# Patient Record
Sex: MALE | Race: WHITE | Employment: OTHER | URBAN - METROPOLITAN AREA
[De-identification: names, ages, dates, MRNs, and addresses within clinical notes are randomized per-mention and may not be internally consistent; named-entity substitution may affect disease eponyms.]

---

## 2017-01-31 RX ORDER — METOPROLOL SUCCINATE 50 MG/1
TABLET, EXTENDED RELEASE ORAL
Qty: 90 TABLET | Refills: 3 | Status: SHIPPED | OUTPATIENT
Start: 2017-01-31 | End: 2017-03-07 | Stop reason: DRUGHIGH

## 2017-03-07 ENCOUNTER — OFFICE VISIT (OUTPATIENT)
Dept: CARDIOLOGY CLINIC | Age: 72
End: 2017-03-07

## 2017-03-07 VITALS
HEIGHT: 69 IN | WEIGHT: 189 LBS | HEART RATE: 62 BPM | DIASTOLIC BLOOD PRESSURE: 88 MMHG | OXYGEN SATURATION: 95 % | SYSTOLIC BLOOD PRESSURE: 152 MMHG | BODY MASS INDEX: 27.99 KG/M2

## 2017-03-07 DIAGNOSIS — E78.00 PURE HYPERCHOLESTEROLEMIA: ICD-10-CM

## 2017-03-07 DIAGNOSIS — N18.3 CKD (CHRONIC KIDNEY DISEASE), STAGE 3 (MODERATE): ICD-10-CM

## 2017-03-07 DIAGNOSIS — I25.2 HISTORY OF ST ELEVATION MYOCARDIAL INFARCTION (STEMI): Chronic | ICD-10-CM

## 2017-03-07 DIAGNOSIS — I10 ESSENTIAL HYPERTENSION: Chronic | ICD-10-CM

## 2017-03-07 DIAGNOSIS — I25.10 CORONARY ARTERY DISEASE INVOLVING NATIVE CORONARY ARTERY OF NATIVE HEART WITHOUT ANGINA PECTORIS: Primary | Chronic | ICD-10-CM

## 2017-03-07 PROCEDURE — G8427 DOCREV CUR MEDS BY ELIG CLIN: HCPCS | Performed by: INTERNAL MEDICINE

## 2017-03-07 PROCEDURE — 3017F COLORECTAL CA SCREEN DOC REV: CPT | Performed by: INTERNAL MEDICINE

## 2017-03-07 PROCEDURE — 1036F TOBACCO NON-USER: CPT | Performed by: INTERNAL MEDICINE

## 2017-03-07 PROCEDURE — G8420 CALC BMI NORM PARAMETERS: HCPCS | Performed by: INTERNAL MEDICINE

## 2017-03-07 PROCEDURE — 99214 OFFICE O/P EST MOD 30 MIN: CPT | Performed by: INTERNAL MEDICINE

## 2017-03-07 PROCEDURE — 1123F ACP DISCUSS/DSCN MKR DOCD: CPT | Performed by: INTERNAL MEDICINE

## 2017-03-07 PROCEDURE — G8598 ASA/ANTIPLAT THER USED: HCPCS | Performed by: INTERNAL MEDICINE

## 2017-03-07 PROCEDURE — 4040F PNEUMOC VAC/ADMIN/RCVD: CPT | Performed by: INTERNAL MEDICINE

## 2017-03-07 PROCEDURE — G8484 FLU IMMUNIZE NO ADMIN: HCPCS | Performed by: INTERNAL MEDICINE

## 2017-03-07 RX ORDER — METOPROLOL SUCCINATE 25 MG/1
25 TABLET, EXTENDED RELEASE ORAL DAILY
Status: ON HOLD | COMMUNITY
End: 2019-12-27 | Stop reason: HOSPADM

## 2017-03-07 RX ORDER — PIOGLITAZONEHYDROCHLORIDE 45 MG/1
45 TABLET ORAL DAILY
COMMUNITY
End: 2018-08-17 | Stop reason: ALTCHOICE

## 2017-03-07 RX ORDER — LISINOPRIL AND HYDROCHLOROTHIAZIDE 20; 12.5 MG/1; MG/1
1 TABLET ORAL DAILY
COMMUNITY
End: 2017-06-21 | Stop reason: ALTCHOICE

## 2017-03-10 RX ORDER — PRAVASTATIN SODIUM 10 MG
10 TABLET ORAL DAILY
Qty: 30 TABLET | Refills: 6 | Status: SHIPPED | OUTPATIENT
Start: 2017-03-10 | End: 2017-10-05

## 2017-04-03 RX ORDER — ATORVASTATIN CALCIUM 80 MG/1
TABLET, FILM COATED ORAL
Qty: 30 TABLET | Refills: 5 | OUTPATIENT
Start: 2017-04-03

## 2017-04-04 ENCOUNTER — TELEPHONE (OUTPATIENT)
Dept: CARDIOLOGY CLINIC | Age: 72
End: 2017-04-04

## 2017-05-05 ENCOUNTER — OFFICE VISIT (OUTPATIENT)
Dept: CARDIOLOGY CLINIC | Age: 72
End: 2017-05-05

## 2017-05-05 VITALS
HEIGHT: 69 IN | HEART RATE: 86 BPM | DIASTOLIC BLOOD PRESSURE: 70 MMHG | SYSTOLIC BLOOD PRESSURE: 138 MMHG | OXYGEN SATURATION: 97 % | WEIGHT: 193 LBS | BODY MASS INDEX: 28.58 KG/M2

## 2017-05-05 DIAGNOSIS — E78.00 PURE HYPERCHOLESTEROLEMIA: Chronic | ICD-10-CM

## 2017-05-05 DIAGNOSIS — I10 ESSENTIAL HYPERTENSION: Chronic | ICD-10-CM

## 2017-05-05 DIAGNOSIS — N18.3 CKD (CHRONIC KIDNEY DISEASE), STAGE 3 (MODERATE): ICD-10-CM

## 2017-05-05 DIAGNOSIS — I25.10 CORONARY ARTERY DISEASE INVOLVING NATIVE CORONARY ARTERY OF NATIVE HEART WITHOUT ANGINA PECTORIS: Primary | Chronic | ICD-10-CM

## 2017-05-05 PROCEDURE — 99214 OFFICE O/P EST MOD 30 MIN: CPT | Performed by: INTERNAL MEDICINE

## 2017-05-05 PROCEDURE — G8598 ASA/ANTIPLAT THER USED: HCPCS | Performed by: INTERNAL MEDICINE

## 2017-05-05 PROCEDURE — G8427 DOCREV CUR MEDS BY ELIG CLIN: HCPCS | Performed by: INTERNAL MEDICINE

## 2017-05-05 PROCEDURE — 3017F COLORECTAL CA SCREEN DOC REV: CPT | Performed by: INTERNAL MEDICINE

## 2017-05-05 PROCEDURE — G8420 CALC BMI NORM PARAMETERS: HCPCS | Performed by: INTERNAL MEDICINE

## 2017-05-05 PROCEDURE — 4040F PNEUMOC VAC/ADMIN/RCVD: CPT | Performed by: INTERNAL MEDICINE

## 2017-05-05 PROCEDURE — 1123F ACP DISCUSS/DSCN MKR DOCD: CPT | Performed by: INTERNAL MEDICINE

## 2017-05-05 PROCEDURE — 1036F TOBACCO NON-USER: CPT | Performed by: INTERNAL MEDICINE

## 2017-10-05 RX ORDER — ROSUVASTATIN CALCIUM 10 MG/1
TABLET, COATED ORAL
Qty: 90 TABLET | Refills: 3 | Status: SHIPPED | OUTPATIENT
Start: 2017-10-05 | End: 2018-10-17 | Stop reason: SDUPTHER

## 2017-10-05 NOTE — TELEPHONE ENCOUNTER
Received rx request for Crestor. I called patient as we had Crestor was switched to pravastatin. He said he never switched and is doing ok on the Crestor.   Ok to call in?

## 2018-07-25 ENCOUNTER — HOSPITAL ENCOUNTER (OUTPATIENT)
Dept: CT IMAGING | Age: 73
Discharge: HOME | End: 2018-07-25

## 2018-07-25 DIAGNOSIS — R97.20 ELEVATED PROSTATE SPECIFIC ANTIGEN (PSA): ICD-10-CM

## 2018-07-25 LAB
GFR AFRICAN AMERICAN: 55
GFR NON-AFRICAN AMERICAN: 46
PERFORMED ON: ABNORMAL
POC CREATININE: 1.5 MG/DL (ref 0.8–1.3)
POC SAMPLE TYPE: ABNORMAL

## 2018-07-25 RX ORDER — TC 99M MEDRONATE 20 MG/10ML
25 INJECTION, POWDER, LYOPHILIZED, FOR SOLUTION INTRAVENOUS
Status: COMPLETED | OUTPATIENT
Start: 2018-07-25 | End: 2018-07-25

## 2018-07-25 RX ADMIN — TC 99M MEDRONATE 25 MILLICURIE: 20 INJECTION, POWDER, LYOPHILIZED, FOR SOLUTION INTRAVENOUS at 09:13

## 2018-08-17 ENCOUNTER — HOSPITAL ENCOUNTER (OUTPATIENT)
Dept: PREADMISSION TESTING | Age: 73
Discharge: HOME | End: 2018-08-17
Attending: UROLOGY | Admitting: UROLOGY

## 2018-08-17 ENCOUNTER — PAT TELEPHONE (OUTPATIENT)
Dept: PREADMISSION TESTING | Age: 73
End: 2018-08-17

## 2018-08-17 VITALS — WEIGHT: 182 LBS | BODY MASS INDEX: 26.96 KG/M2 | HEIGHT: 69 IN

## 2018-08-17 DIAGNOSIS — Z01.818 ENCOUNTER FOR PREADMISSION TESTING: ICD-10-CM

## 2018-08-17 LAB
ABO/RH: NORMAL
ANION GAP SERPL CALCULATED.3IONS-SCNC: 12 MMOL/L (ref 3–16)
ANTIBODY SCREEN: NORMAL
APTT: 29.1 SEC (ref 26–36)
BUN BLDV-MCNC: 32 MG/DL (ref 7–20)
CALCIUM SERPL-MCNC: 9.3 MG/DL (ref 8.3–10.6)
CHLORIDE BLD-SCNC: 106 MMOL/L (ref 99–110)
CO2: 25 MMOL/L (ref 21–32)
CREAT SERPL-MCNC: 1.6 MG/DL (ref 0.8–1.3)
EKG ATRIAL RATE: 66 BPM
EKG DIAGNOSIS: NORMAL
EKG P AXIS: 68 DEGREES
EKG P-R INTERVAL: 188 MS
EKG Q-T INTERVAL: 420 MS
EKG QRS DURATION: 80 MS
EKG QTC CALCULATION (BAZETT): 440 MS
EKG R AXIS: 51 DEGREES
EKG T AXIS: 117 DEGREES
EKG VENTRICULAR RATE: 66 BPM
GFR AFRICAN AMERICAN: 51
GFR NON-AFRICAN AMERICAN: 42
GLUCOSE BLD-MCNC: 160 MG/DL (ref 70–99)
HCT VFR BLD CALC: 45.8 % (ref 40.5–52.5)
HEMOGLOBIN: 15.2 G/DL (ref 13.5–17.5)
INR BLD: 1.04 (ref 0.86–1.14)
MCH RBC QN AUTO: 30.8 PG (ref 26–34)
MCHC RBC AUTO-ENTMCNC: 33.1 G/DL (ref 31–36)
MCV RBC AUTO: 93.1 FL (ref 80–100)
PDW BLD-RTO: 14.6 % (ref 12.4–15.4)
PLATELET # BLD: 232 K/UL (ref 135–450)
PMV BLD AUTO: 7.8 FL (ref 5–10.5)
POTASSIUM SERPL-SCNC: 4.7 MMOL/L (ref 3.5–5.1)
PROTHROMBIN TIME: 11.9 SEC (ref 9.8–13)
RBC # BLD: 4.92 M/UL (ref 4.2–5.9)
SODIUM BLD-SCNC: 143 MMOL/L (ref 136–145)
WBC # BLD: 9 K/UL (ref 4–11)

## 2018-08-17 NOTE — PRE-PROCEDURE INSTRUCTIONS
C-Difficile admission screening and protocol:     * Admitted with diarrhea? no     *Prior history of C-Diff. In last 3 months? no     *Antibiotic use in the past 6-8 weeks?yes   *Prior hospitalization or nursing home in the last month?  no

## 2018-08-22 PROBLEM — C61 PROSTATE CANCER (HCC): Status: ACTIVE | Noted: 2018-08-22

## 2018-08-24 PROBLEM — R33.9 RETENTION, URINE: Status: ACTIVE | Noted: 2018-08-24

## 2018-10-17 RX ORDER — ROSUVASTATIN CALCIUM 10 MG/1
TABLET, COATED ORAL
Qty: 90 TABLET | Refills: 2 | Status: SHIPPED | OUTPATIENT
Start: 2018-10-17 | End: 2019-07-01 | Stop reason: SDUPTHER

## 2018-11-30 ENCOUNTER — OFFICE VISIT (OUTPATIENT)
Dept: CARDIOLOGY CLINIC | Age: 73
End: 2018-11-30
Payer: MEDICARE

## 2018-11-30 VITALS
OXYGEN SATURATION: 98 % | WEIGHT: 179.2 LBS | SYSTOLIC BLOOD PRESSURE: 170 MMHG | BODY MASS INDEX: 26.54 KG/M2 | DIASTOLIC BLOOD PRESSURE: 90 MMHG | HEIGHT: 69 IN | HEART RATE: 70 BPM

## 2018-11-30 DIAGNOSIS — I25.10 CORONARY ARTERY DISEASE INVOLVING NATIVE CORONARY ARTERY OF NATIVE HEART WITHOUT ANGINA PECTORIS: Primary | Chronic | ICD-10-CM

## 2018-11-30 DIAGNOSIS — I25.2 HISTORY OF ST ELEVATION MYOCARDIAL INFARCTION (STEMI): ICD-10-CM

## 2018-11-30 DIAGNOSIS — N18.30 STAGE 3 CHRONIC KIDNEY DISEASE (HCC): ICD-10-CM

## 2018-11-30 DIAGNOSIS — I10 ESSENTIAL HYPERTENSION: Chronic | ICD-10-CM

## 2018-11-30 DIAGNOSIS — E78.00 PURE HYPERCHOLESTEROLEMIA: Chronic | ICD-10-CM

## 2018-11-30 PROCEDURE — G8427 DOCREV CUR MEDS BY ELIG CLIN: HCPCS | Performed by: INTERNAL MEDICINE

## 2018-11-30 PROCEDURE — 1036F TOBACCO NON-USER: CPT | Performed by: INTERNAL MEDICINE

## 2018-11-30 PROCEDURE — 99214 OFFICE O/P EST MOD 30 MIN: CPT | Performed by: INTERNAL MEDICINE

## 2018-11-30 PROCEDURE — G8598 ASA/ANTIPLAT THER USED: HCPCS | Performed by: INTERNAL MEDICINE

## 2018-11-30 PROCEDURE — 1101F PT FALLS ASSESS-DOCD LE1/YR: CPT | Performed by: INTERNAL MEDICINE

## 2018-11-30 PROCEDURE — G8419 CALC BMI OUT NRM PARAM NOF/U: HCPCS | Performed by: INTERNAL MEDICINE

## 2018-11-30 PROCEDURE — G8484 FLU IMMUNIZE NO ADMIN: HCPCS | Performed by: INTERNAL MEDICINE

## 2018-11-30 PROCEDURE — 1123F ACP DISCUSS/DSCN MKR DOCD: CPT | Performed by: INTERNAL MEDICINE

## 2018-11-30 PROCEDURE — 3017F COLORECTAL CA SCREEN DOC REV: CPT | Performed by: INTERNAL MEDICINE

## 2018-11-30 PROCEDURE — 4040F PNEUMOC VAC/ADMIN/RCVD: CPT | Performed by: INTERNAL MEDICINE

## 2018-11-30 RX ORDER — LISINOPRIL 10 MG/1
10 TABLET ORAL DAILY
Qty: 30 TABLET | Refills: 6 | Status: ON HOLD | OUTPATIENT
Start: 2018-11-30 | End: 2019-12-27 | Stop reason: HOSPADM

## 2018-11-30 RX ORDER — ASPIRIN 81 MG/1
81 TABLET ORAL DAILY
Qty: 90 TABLET | Refills: 1
Start: 2018-11-30 | End: 2020-03-19 | Stop reason: SINTOL

## 2018-11-30 NOTE — PROGRESS NOTES
[address]      Cardiology Consult    Long Conteh  1945 November 30, 2018    Reason for Visit: CAD    CC: \"I had chest discomfort 6 weeks ago\"    HPI:  The patient is 74 y.o. male with a past medical history significant for CAD s/p PCI, HTN, and DM who presents for follow-up regarding CAD. He was hospitalized 5/1-5/3/2016 after presenting to [address] [place] [address] with left arm pain and chest pressure and was found to have acute inferior wall MI. He was transferred to [facility] for urgent cardiac cath which revealed a 100% mid Lcx stenosis that was successfully intervened with DENA placed. There were no complications and post procedure course was non-eventful. He has residual disease in his LAD, diagnoal & distal RCA. Echo shows normal LV function. He was admitted to [address] [place] [address] 2/2017 with atypical chest pains. He ruled out for an MI and underwent stress testing which did not show any reversible defects. Today, he reports a brief (<2 minutes) nonexertional episode of chest pain which occurred about 6-8 weeks ago without recurrence. He denies exertional chest pain/pressure, dyspnea at rest, worsening MERCEDES, PND, orthopnea, palpitations, lightheadedness, weight changes, LE edema, and syncope. He states that he started radiation treatments for his prostate cancer. He is chronically fatigued. His BP at home has been running 551-843 systolic. Past Medical History:   Diagnosis Date    Asthma     as a child-not current    CAD (coronary artery disease)     Diabetes mellitus ([address]    Michelle CALVILLO (hard of hearing)     wears bilat hearing aids    HTN (hypertension)     Hyperlipidemia     Melanoma ([address]     back    Prostate CA [facility])      Past Surgical History:   Procedure Laterality Date    CORONARY ANGIOPLASTY WITH STENT PLACEMENT  5/1/2016    DENA to mid circ    CYSTOSCOPY  08/22/2018    cysto, [facility] T[facility]    HERNIA REPAIR Bilateral distant past    SHOULDER SURGERY Bilateral 10-12 years ago     History reviewed. Successful angioplasty followed by stent deployment in the mid circumflex artery using a drug-eluting stent, 100% stenosis reduced to 0% using 3.25 by 18 Alpine stent. Final diameter was 3.34 with 0% residual stenosis. SAMEER grade-3 flow was established. There appears to be a 40% to 50% stenosis of the distal circumflex branch beyond the area of the stenosis. There appears to be also a microembolization in a small distal circumflex branch. Echo 5/2/2016:  Left ventricle size is normal.  Mild concentric left ventricular hypertrophy is present. Ejection fraction is visually estimated to be 50-55 %. There is reversal of E/A inflow velocities across the mitral valve suggesting impaired left ventricular relaxation. The left atrium is dilated. Myoview 2/22/17 THE [facility] report)  Fixed defect at the \"superior aspect of the inferolateral wall. \"  EF 52%. Assessment and Plan:    1. CAD with history of STEMI. Status post DENA to LCx 5/1/2016. Asymptomatic. Continue with medical management and risk factor modification including ASA, ACE-I, and beta blocker. (Fatigue not improved off BB). Discontinued plavix since it has been 1 year since PCI. Completed cardiac rehab. 2. Essential Hypertension. Uncontrolled. With diabetes, goal BP <130/80. Continue medical therapy and lifestyle modifications. Today, will start lisinopril 10mg daily and continue to monitor. BMP in 1-2 weeks. He says he will follow-up with his PCP for medication titration. 3. Hyperlipidemia. Reports muscle weakness on high dose Lipitor. Continue Crestor. 4. CKD. Stage III. Creatinine 1.5 9/2018. Previously referred to nephrology. Follow up in 1 year. Thank you very much for allowing me to participate in the care of your patient. Please do not hesitate to contact me if you have any questions. Sincerely,  Mady Jimenez.  Rosa Elena Key, [address] [place] [address], [address] [place], [place] [address]  Ph: [phone]  Fax: [phone]    This note was scribed in the presence of the physician by Cait Sosa RN. Physician Attestation: The scribes documentation has been prepared under my direction and personally reviewed by me in its entirety. I confirm that the note above accurately reflects all work, treatment, procedures, and medical decision making performed by me.

## 2018-12-19 ENCOUNTER — HOSPITAL ENCOUNTER (OUTPATIENT)
Dept: WOMENS IMAGING | Age: 73
Discharge: HOME | End: 2018-12-19
Payer: MEDICARE

## 2018-12-19 DIAGNOSIS — M81.0 OSTEOPOROSIS, UNSPECIFIED OSTEOPOROSIS TYPE, UNSPECIFIED PATHOLOGICAL FRACTURE PRESENCE: ICD-10-CM

## 2018-12-19 PROCEDURE — 77080 DXA BONE DENSITY AXIAL: CPT

## 2019-07-02 RX ORDER — ROSUVASTATIN CALCIUM 10 MG/1
TABLET, COATED ORAL
Qty: 90 TABLET | Refills: 3 | Status: SHIPPED | OUTPATIENT
Start: 2019-07-02 | End: 2020-06-23

## 2019-07-29 ENCOUNTER — HOSPITAL ENCOUNTER (EMERGENCY)
Age: 74
Discharge: HOME | End: 2019-07-29
Attending: EMERGENCY MEDICINE
Payer: MEDICARE

## 2019-07-29 ENCOUNTER — APPOINTMENT (OUTPATIENT)
Dept: CT IMAGING | Age: 74
End: 2019-07-29
Payer: MEDICARE

## 2019-07-29 VITALS
SYSTOLIC BLOOD PRESSURE: 185 MMHG | DIASTOLIC BLOOD PRESSURE: 85 MMHG | OXYGEN SATURATION: 97 % | RESPIRATION RATE: 16 BRPM | TEMPERATURE: 96.9 F | HEART RATE: 68 BPM

## 2019-07-29 DIAGNOSIS — R51.9 ACUTE NONINTRACTABLE HEADACHE, UNSPECIFIED HEADACHE TYPE: ICD-10-CM

## 2019-07-29 DIAGNOSIS — J01.10 ACUTE FRONTAL SINUSITIS, RECURRENCE NOT SPECIFIED: Primary | ICD-10-CM

## 2019-07-29 LAB
A/G RATIO: 1.4 (ref 1.1–2.2)
ALBUMIN SERPL-MCNC: 3.8 G/DL (ref 3.4–5)
ALP BLD-CCNC: 98 U/L (ref 40–129)
ALT SERPL-CCNC: 13 U/L (ref 10–40)
ANION GAP SERPL CALCULATED.3IONS-SCNC: 11 MMOL/L (ref 3–16)
AST SERPL-CCNC: 14 U/L (ref 15–37)
BASOPHILS ABSOLUTE: 0.1 K/UL (ref 0–0.2)
BASOPHILS RELATIVE PERCENT: 0.7 %
BETA-HYDROXYBUTYRATE: 0.11 MMOL/L (ref 0–0.27)
BILIRUB SERPL-MCNC: 0.4 MG/DL (ref 0–1)
BUN BLDV-MCNC: 27 MG/DL (ref 7–20)
CALCIUM SERPL-MCNC: 9.8 MG/DL (ref 8.3–10.6)
CHLORIDE BLD-SCNC: 102 MMOL/L (ref 99–110)
CO2: 24 MMOL/L (ref 21–32)
CREAT SERPL-MCNC: 1.4 MG/DL (ref 0.8–1.3)
EOSINOPHILS ABSOLUTE: 0.4 K/UL (ref 0–0.6)
EOSINOPHILS RELATIVE PERCENT: 3.7 %
GFR AFRICAN AMERICAN: 60
GFR NON-AFRICAN AMERICAN: 49
GLOBULIN: 2.8 G/DL
GLUCOSE BLD-MCNC: 169 MG/DL (ref 70–99)
HCT VFR BLD CALC: 37.4 % (ref 40.5–52.5)
HEMOGLOBIN: 12.9 G/DL (ref 13.5–17.5)
LYMPHOCYTES ABSOLUTE: 1.5 K/UL (ref 1–5.1)
LYMPHOCYTES RELATIVE PERCENT: 14.9 %
MCH RBC QN AUTO: 31.7 PG (ref 26–34)
MCHC RBC AUTO-ENTMCNC: 34.4 G/DL (ref 31–36)
MCV RBC AUTO: 92.1 FL (ref 80–100)
MONOCYTES ABSOLUTE: 1 K/UL (ref 0–1.3)
MONOCYTES RELATIVE PERCENT: 10.3 %
NEUTROPHILS ABSOLUTE: 6.9 K/UL (ref 1.7–7.7)
NEUTROPHILS RELATIVE PERCENT: 70.4 %
PDW BLD-RTO: 13.6 % (ref 12.4–15.4)
PLATELET # BLD: 217 K/UL (ref 135–450)
PMV BLD AUTO: 6.8 FL (ref 5–10.5)
POTASSIUM REFLEX MAGNESIUM: 4.3 MMOL/L (ref 3.5–5.1)
RBC # BLD: 4.06 M/UL (ref 4.2–5.9)
SEDIMENTATION RATE, ERYTHROCYTE: 44 MM/HR (ref 0–20)
SODIUM BLD-SCNC: 137 MMOL/L (ref 136–145)
TOTAL PROTEIN: 6.6 G/DL (ref 6.4–8.2)
WBC # BLD: 9.8 K/UL (ref 4–11)

## 2019-07-29 PROCEDURE — 96365 THER/PROPH/DIAG IV INF INIT: CPT

## 2019-07-29 PROCEDURE — 85025 COMPLETE CBC W/AUTO DIFF WBC: CPT

## 2019-07-29 PROCEDURE — 6360000002 HC RX W HCPCS: Performed by: EMERGENCY MEDICINE

## 2019-07-29 PROCEDURE — 2580000003 HC RX 258: Performed by: EMERGENCY MEDICINE

## 2019-07-29 PROCEDURE — 80053 COMPREHEN METABOLIC PANEL: CPT

## 2019-07-29 PROCEDURE — 70450 CT HEAD/BRAIN W/O DYE: CPT

## 2019-07-29 PROCEDURE — 70486 CT MAXILLOFACIAL W/O DYE: CPT

## 2019-07-29 PROCEDURE — 96375 TX/PRO/DX INJ NEW DRUG ADDON: CPT

## 2019-07-29 PROCEDURE — 85652 RBC SED RATE AUTOMATED: CPT

## 2019-07-29 PROCEDURE — 82010 KETONE BODYS QUAN: CPT

## 2019-07-29 PROCEDURE — 99284 EMERGENCY DEPT VISIT MOD MDM: CPT

## 2019-07-29 RX ORDER — KETOROLAC TROMETHAMINE 10 MG/1
10 TABLET, FILM COATED ORAL 3 TIMES DAILY PRN
Qty: 15 TABLET | Refills: 0 | Status: SHIPPED | OUTPATIENT
Start: 2019-07-29 | End: 2020-01-10 | Stop reason: ALTCHOICE

## 2019-07-29 RX ORDER — KETOROLAC TROMETHAMINE 30 MG/ML
15 INJECTION, SOLUTION INTRAMUSCULAR; INTRAVENOUS ONCE
Status: COMPLETED | OUTPATIENT
Start: 2019-07-29 | End: 2019-07-29

## 2019-07-29 RX ORDER — CEFUROXIME AXETIL 250 MG/1
250 TABLET ORAL 2 TIMES DAILY
Qty: 28 TABLET | Refills: 0 | Status: SHIPPED | OUTPATIENT
Start: 2019-07-29 | End: 2019-08-12

## 2019-07-29 RX ORDER — METOCLOPRAMIDE HYDROCHLORIDE 5 MG/ML
5 INJECTION INTRAMUSCULAR; INTRAVENOUS ONCE
Status: COMPLETED | OUTPATIENT
Start: 2019-07-29 | End: 2019-07-29

## 2019-07-29 RX ORDER — DIPHENHYDRAMINE HYDROCHLORIDE 50 MG/ML
12.5 INJECTION INTRAMUSCULAR; INTRAVENOUS ONCE
Status: COMPLETED | OUTPATIENT
Start: 2019-07-29 | End: 2019-07-29

## 2019-07-29 RX ORDER — FLUTICASONE PROPIONATE 50 MCG
SPRAY, SUSPENSION (ML) NASAL
Qty: 1 BOTTLE | Refills: 0 | Status: SHIPPED | OUTPATIENT
Start: 2019-07-29 | End: 2020-01-10

## 2019-07-29 RX ORDER — 0.9 % SODIUM CHLORIDE 0.9 %
1000 INTRAVENOUS SOLUTION INTRAVENOUS ONCE
Status: COMPLETED | OUTPATIENT
Start: 2019-07-29 | End: 2019-07-29

## 2019-07-29 RX ADMIN — METOCLOPRAMIDE 5 MG: 5 INJECTION, SOLUTION INTRAMUSCULAR; INTRAVENOUS at 14:46

## 2019-07-29 RX ADMIN — CEFTRIAXONE 1 G: 1 INJECTION, POWDER, FOR SOLUTION INTRAMUSCULAR; INTRAVENOUS at 14:46

## 2019-07-29 RX ADMIN — DIPHENHYDRAMINE HYDROCHLORIDE 12.5 MG: 50 INJECTION, SOLUTION INTRAMUSCULAR; INTRAVENOUS at 14:46

## 2019-07-29 RX ADMIN — KETOROLAC TROMETHAMINE 15 MG: 30 INJECTION, SOLUTION INTRAMUSCULAR at 14:46

## 2019-07-29 RX ADMIN — SODIUM CHLORIDE 1000 ML: 9 INJECTION, SOLUTION INTRAVENOUS at 14:46

## 2019-07-29 ASSESSMENT — ENCOUNTER SYMPTOMS
VOMITING: 0
SORE THROAT: 0
SHORTNESS OF BREATH: 0
COUGH: 0
EYE DISCHARGE: 0
DIARRHEA: 0
ABDOMINAL PAIN: 0
RHINORRHEA: 0
NAUSEA: 0
EYE PAIN: 0
WHEEZING: 0
EYE REDNESS: 0
BACK PAIN: 0

## 2019-07-29 ASSESSMENT — PAIN SCALES - GENERAL
PAINLEVEL_OUTOF10: 7
PAINLEVEL_OUTOF10: 8

## 2019-07-29 ASSESSMENT — PAIN DESCRIPTION - PAIN TYPE: TYPE: ACUTE PAIN

## 2019-07-29 ASSESSMENT — PAIN DESCRIPTION - LOCATION: LOCATION: HEAD

## 2019-07-29 NOTE — ED PROVIDER NOTES
frontal sinus. Mucosal thickening also noted in the sphenoid sinus. The mastoid air cells are well aerated. SOFT TISSUES:  Visualized soft tissues demonstrate no acute abnormality. The visualized portion of the intracranial contents demonstrate no gross acute abnormality. 1. Previous sinus surgery demonstrated with resection of the medial walls of the maxillary sinuses 2. Mucosal thickening seen throughout the paranasal sinuses 3. Evidence for fluid in the left side of the frontal sinus and the right maxillary sinus which could represent acute sinusitis           PROCEDURES   Unless otherwise noted below, none     Procedures    CRITICAL CARE TIME   N/A    CONSULTS:  None    EMERGENCY DEPARTMENT COURSE and DIFFERENTIAL DIAGNOSIS/MDM:   Vitals:    Vitals:    07/29/19 1317   BP: (!) 185/85   Pulse: 68   Resp: 16   Temp: 96.9 °F (36.1 °C)   TempSrc: Oral   SpO2: 97%       Patient was given the following medications:  Medications   0.9 % sodium chloride bolus (1,000 mLs Intravenous New Bag 7/29/19 1446)   metoclopramide (REGLAN) injection 5 mg (5 mg Intravenous Given 7/29/19 1446)   diphenhydrAMINE (BENADRYL) injection 12.5 mg (12.5 mg Intravenous Given 7/29/19 1446)   cefTRIAXone (ROCEPHIN) 1 g IVPB in 50 mL D5W minibag (1 g Intravenous New Bag 7/29/19 1446)   ketorolac (TORADOL) injection 15 mg (15 mg Intravenous Given 7/29/19 1446)       Clinically there is no signs of meningitis or acute thromboembolic neurologic event. His neurologic exam is normal.  A good portion of this history sounds very much like sinusitis especially the part where it gets worse when he bends over. He does have an air-fluid level in the frontal sinus which is exactly where he points for his pain. He did not improve with Reglan and Benadryl which makes sense and that this does not seem migrainous. But did respond to a small dose of Toradol. FINAL IMPRESSION      1. Acute frontal sinusitis, recurrence not specified    2.  Acute nonintractable headache, unspecified headache type          DISPOSITION/PLAN    DISPOSITION Discharge - Pending Orders Complete 07/29/2019 03:08:04 PM      PATIENT REFERRED TO:  MD LCIFF Olivas Katya Flores [address] [place] [address]  [phone]            DISCHARGE MEDICATIONS:  New Prescriptions    CEFUROXIME (CEFTIN) 250 MG TABLET    Take 1 tablet by mouth 2 times daily for 14 days    FLUTICASONE (FLONASE) 50 MCG/ACT NASAL SPRAY    1 spray in each nostril twice daily    KETOROLAC (TORADOL) 10 MG TABLET    Take 1 tablet by mouth 3 times daily as needed for Pain Do not take more than 4 pills melquiades 24 hour period       DISCONTINUED MEDICATIONS:  Discontinued Medications    No medications on file              (Please note that portions of this note were completed with a voice recognition program.  Efforts were made to editthe dictations but occasionally words are mis-transcribed.)    Zulema Wallace MD (electronically signed)            Zulema Wallace MD  07/29/19 3818

## 2019-12-26 ENCOUNTER — HOSPITAL ENCOUNTER (INPATIENT)
Dept: CARDIAC CATH/INVASIVE PROCEDURES | Age: 74
LOS: 1 days | Discharge: HOME | DRG: 247 | End: 2019-12-27
Attending: INTERNAL MEDICINE | Admitting: INTERNAL MEDICINE
Payer: MEDICARE

## 2019-12-26 PROBLEM — I21.4 NSTEMI (NON-ST ELEVATED MYOCARDIAL INFARCTION) (HCC): Status: ACTIVE | Noted: 2019-12-26

## 2019-12-26 PROBLEM — Z98.890 S/P LEFT HEART CATHETERIZATION BY PERCUTANEOUS APPROACH: Status: ACTIVE | Noted: 2019-12-26

## 2019-12-26 LAB
EKG ATRIAL RATE: 66 BPM
EKG DIAGNOSIS: NORMAL
EKG P AXIS: 73 DEGREES
EKG P-R INTERVAL: 202 MS
EKG Q-T INTERVAL: 438 MS
EKG QRS DURATION: 86 MS
EKG QTC CALCULATION (BAZETT): 459 MS
EKG R AXIS: 54 DEGREES
EKG T AXIS: 83 DEGREES
EKG VENTRICULAR RATE: 66 BPM
POC ACT LR: 149 SEC
POC ACT LR: 310 SEC

## 2019-12-26 PROCEDURE — 93010 ELECTROCARDIOGRAM REPORT: CPT | Performed by: INTERNAL MEDICINE

## 2019-12-26 PROCEDURE — 2140000000 HC CCU INTERMEDIATE R&B

## 2019-12-26 PROCEDURE — 85347 COAGULATION TIME ACTIVATED: CPT

## 2019-12-26 PROCEDURE — 6360000004 HC RX CONTRAST MEDICATION: Performed by: INTERNAL MEDICINE

## 2019-12-26 PROCEDURE — 99153 MOD SED SAME PHYS/QHP EA: CPT

## 2019-12-26 PROCEDURE — C1894 INTRO/SHEATH, NON-LASER: HCPCS

## 2019-12-26 PROCEDURE — 2580000003 HC RX 258: Performed by: INTERNAL MEDICINE

## 2019-12-26 PROCEDURE — C1887 CATHETER, GUIDING: HCPCS

## 2019-12-26 PROCEDURE — B2111ZZ FLUOROSCOPY OF MULTIPLE CORONARY ARTERIES USING LOW OSMOLAR CONTRAST: ICD-10-PCS | Performed by: INTERNAL MEDICINE

## 2019-12-26 PROCEDURE — C1725 CATH, TRANSLUMIN NON-LASER: HCPCS

## 2019-12-26 PROCEDURE — C1874 STENT, COATED/COV W/DEL SYS: HCPCS

## 2019-12-26 PROCEDURE — 4A033BC MEASUREMENT OF ARTERIAL PRESSURE, CORONARY, PERCUTANEOUS APPROACH: ICD-10-PCS | Performed by: INTERNAL MEDICINE

## 2019-12-26 PROCEDURE — 2580000003 HC RX 258

## 2019-12-26 PROCEDURE — 6370000000 HC RX 637 (ALT 250 FOR IP): Performed by: INTERNAL MEDICINE

## 2019-12-26 PROCEDURE — 93571 IV DOP VEL&/PRESS C FLO 1ST: CPT | Performed by: INTERNAL MEDICINE

## 2019-12-26 PROCEDURE — 93454 CORONARY ARTERY ANGIO S&I: CPT

## 2019-12-26 PROCEDURE — 99152 MOD SED SAME PHYS/QHP 5/>YRS: CPT

## 2019-12-26 PROCEDURE — 6360000002 HC RX W HCPCS: Performed by: NURSE PRACTITIONER

## 2019-12-26 PROCEDURE — 2720000010 HC SURG SUPPLY STERILE

## 2019-12-26 PROCEDURE — 6360000002 HC RX W HCPCS

## 2019-12-26 PROCEDURE — 93005 ELECTROCARDIOGRAM TRACING: CPT | Performed by: INTERNAL MEDICINE

## 2019-12-26 PROCEDURE — 92928 PRQ TCAT PLMT NTRAC ST 1 LES: CPT | Performed by: INTERNAL MEDICINE

## 2019-12-26 PROCEDURE — 93571 IV DOP VEL&/PRESS C FLO 1ST: CPT

## 2019-12-26 PROCEDURE — C1769 GUIDE WIRE: HCPCS

## 2019-12-26 PROCEDURE — C9600 PERC DRUG-EL COR STENT SING: HCPCS

## 2019-12-26 PROCEDURE — C1760 CLOSURE DEV, VASC: HCPCS

## 2019-12-26 PROCEDURE — 2500000003 HC RX 250 WO HCPCS

## 2019-12-26 PROCEDURE — 027135Z DILATION OF CORONARY ARTERY, TWO ARTERIES WITH TWO DRUG-ELUTING INTRALUMINAL DEVICES, PERCUTANEOUS APPROACH: ICD-10-PCS | Performed by: INTERNAL MEDICINE

## 2019-12-26 RX ORDER — FENTANYL CITRATE 50 UG/ML
25 INJECTION, SOLUTION INTRAMUSCULAR; INTRAVENOUS
Status: ACTIVE | OUTPATIENT
Start: 2019-12-26 | End: 2019-12-26

## 2019-12-26 RX ORDER — CLONIDINE HYDROCHLORIDE 0.1 MG/1
0.1 TABLET ORAL ONCE
Status: DISCONTINUED | OUTPATIENT
Start: 2019-12-26 | End: 2019-12-27

## 2019-12-26 RX ORDER — HYDRALAZINE HYDROCHLORIDE 20 MG/ML
10 INJECTION INTRAMUSCULAR; INTRAVENOUS EVERY 6 HOURS PRN
Status: DISCONTINUED | OUTPATIENT
Start: 2019-12-26 | End: 2019-12-27 | Stop reason: HOSPADM

## 2019-12-26 RX ORDER — LISINOPRIL 10 MG/1
10 TABLET ORAL DAILY
Status: DISCONTINUED | OUTPATIENT
Start: 2019-12-26 | End: 2019-12-27

## 2019-12-26 RX ORDER — SODIUM CHLORIDE 9 MG/ML
INJECTION, SOLUTION INTRAVENOUS CONTINUOUS
Status: DISCONTINUED | OUTPATIENT
Start: 2019-12-26 | End: 2019-12-27

## 2019-12-26 RX ORDER — SODIUM CHLORIDE 0.9 % (FLUSH) 0.9 %
10 SYRINGE (ML) INJECTION EVERY 12 HOURS SCHEDULED
Status: DISCONTINUED | OUTPATIENT
Start: 2019-12-26 | End: 2019-12-27 | Stop reason: HOSPADM

## 2019-12-26 RX ORDER — SODIUM CHLORIDE 0.9 % (FLUSH) 0.9 %
10 SYRINGE (ML) INJECTION PRN
Status: DISCONTINUED | OUTPATIENT
Start: 2019-12-26 | End: 2019-12-27 | Stop reason: HOSPADM

## 2019-12-26 RX ORDER — ATORVASTATIN CALCIUM 40 MG/1
40 TABLET, FILM COATED ORAL NIGHTLY
Status: DISCONTINUED | OUTPATIENT
Start: 2019-12-26 | End: 2019-12-27 | Stop reason: HOSPADM

## 2019-12-26 RX ORDER — ASPIRIN 81 MG/1
81 TABLET, CHEWABLE ORAL DAILY
Status: DISCONTINUED | OUTPATIENT
Start: 2019-12-27 | End: 2019-12-27 | Stop reason: HOSPADM

## 2019-12-26 RX ORDER — NIFEDIPINE 60 MG/1
60 TABLET, EXTENDED RELEASE ORAL DAILY
Status: DISCONTINUED | OUTPATIENT
Start: 2019-12-26 | End: 2019-12-27

## 2019-12-26 RX ORDER — MORPHINE SULFATE 2 MG/ML
2 INJECTION, SOLUTION INTRAMUSCULAR; INTRAVENOUS
Status: ACTIVE | OUTPATIENT
Start: 2019-12-26 | End: 2019-12-26

## 2019-12-26 RX ORDER — 0.9 % SODIUM CHLORIDE 0.9 %
500 INTRAVENOUS SOLUTION INTRAVENOUS PRN
Status: DISCONTINUED | OUTPATIENT
Start: 2019-12-26 | End: 2019-12-27 | Stop reason: HOSPADM

## 2019-12-26 RX ORDER — ATROPINE SULFATE 0.4 MG/ML
0.5 AMPUL (ML) INJECTION
Status: ACTIVE | OUTPATIENT
Start: 2019-12-26 | End: 2019-12-26

## 2019-12-26 RX ORDER — ONDANSETRON 2 MG/ML
4 INJECTION INTRAMUSCULAR; INTRAVENOUS EVERY 6 HOURS PRN
Status: DISCONTINUED | OUTPATIENT
Start: 2019-12-26 | End: 2019-12-27 | Stop reason: HOSPADM

## 2019-12-26 RX ORDER — METOPROLOL SUCCINATE 25 MG/1
25 TABLET, EXTENDED RELEASE ORAL DAILY
Status: DISCONTINUED | OUTPATIENT
Start: 2019-12-26 | End: 2019-12-27

## 2019-12-26 RX ORDER — MIDAZOLAM HYDROCHLORIDE 1 MG/ML
2 INJECTION INTRAMUSCULAR; INTRAVENOUS
Status: ACTIVE | OUTPATIENT
Start: 2019-12-26 | End: 2019-12-26

## 2019-12-26 RX ORDER — ACETAMINOPHEN 325 MG/1
650 TABLET ORAL EVERY 4 HOURS PRN
Status: DISCONTINUED | OUTPATIENT
Start: 2019-12-26 | End: 2019-12-27 | Stop reason: HOSPADM

## 2019-12-26 RX ADMIN — TICAGRELOR 90 MG: 90 TABLET ORAL at 21:05

## 2019-12-26 RX ADMIN — IOPAMIDOL 150 ML: 755 INJECTION, SOLUTION INTRAVENOUS at 15:34

## 2019-12-26 RX ADMIN — HYDRALAZINE HYDROCHLORIDE 10 MG: 20 INJECTION INTRAMUSCULAR; INTRAVENOUS at 20:10

## 2019-12-26 RX ADMIN — LISINOPRIL 10 MG: 10 TABLET ORAL at 17:42

## 2019-12-26 RX ADMIN — SODIUM CHLORIDE, PRESERVATIVE FREE 10 ML: 5 INJECTION INTRAVENOUS at 20:10

## 2019-12-26 RX ADMIN — NIFEDIPINE 60 MG: 60 TABLET, FILM COATED, EXTENDED RELEASE ORAL at 18:16

## 2019-12-26 RX ADMIN — ATORVASTATIN CALCIUM 40 MG: 40 TABLET, FILM COATED ORAL at 21:05

## 2019-12-26 RX ADMIN — METOPROLOL SUCCINATE 25 MG: 25 TABLET, EXTENDED RELEASE ORAL at 17:42

## 2019-12-26 ASSESSMENT — PAIN SCALES - GENERAL
PAINLEVEL_OUTOF10: 0

## 2019-12-27 VITALS
HEART RATE: 77 BPM | WEIGHT: 180.34 LBS | HEIGHT: 69 IN | DIASTOLIC BLOOD PRESSURE: 74 MMHG | RESPIRATION RATE: 13 BRPM | TEMPERATURE: 97.9 F | OXYGEN SATURATION: 97 % | SYSTOLIC BLOOD PRESSURE: 157 MMHG | BODY MASS INDEX: 26.71 KG/M2

## 2019-12-27 LAB
EKG ATRIAL RATE: 73 BPM
EKG DIAGNOSIS: NORMAL
EKG P AXIS: 62 DEGREES
EKG P-R INTERVAL: 194 MS
EKG Q-T INTERVAL: 448 MS
EKG QRS DURATION: 86 MS
EKG QTC CALCULATION (BAZETT): 493 MS
EKG R AXIS: 27 DEGREES
EKG T AXIS: 100 DEGREES
EKG VENTRICULAR RATE: 73 BPM
GLUCOSE BLD-MCNC: 249 MG/DL (ref 70–99)
HCT VFR BLD CALC: 37.7 % (ref 40.5–52.5)
HEMOGLOBIN: 12.7 G/DL (ref 13.5–17.5)
MCH RBC QN AUTO: 30.7 PG (ref 26–34)
MCHC RBC AUTO-ENTMCNC: 33.7 G/DL (ref 31–36)
MCV RBC AUTO: 91 FL (ref 80–100)
PDW BLD-RTO: 14.3 % (ref 12.4–15.4)
PERFORMED ON: ABNORMAL
PLATELET # BLD: 218 K/UL (ref 135–450)
PMV BLD AUTO: 7.1 FL (ref 5–10.5)
RBC # BLD: 4.14 M/UL (ref 4.2–5.9)
WBC # BLD: 10.9 K/UL (ref 4–11)

## 2019-12-27 PROCEDURE — 85027 COMPLETE CBC AUTOMATED: CPT

## 2019-12-27 PROCEDURE — 99238 HOSP IP/OBS DSCHRG MGMT 30/<: CPT | Performed by: INTERNAL MEDICINE

## 2019-12-27 PROCEDURE — 6370000000 HC RX 637 (ALT 250 FOR IP): Performed by: INTERNAL MEDICINE

## 2019-12-27 PROCEDURE — 93010 ELECTROCARDIOGRAM REPORT: CPT | Performed by: INTERNAL MEDICINE

## 2019-12-27 PROCEDURE — 36415 COLL VENOUS BLD VENIPUNCTURE: CPT

## 2019-12-27 PROCEDURE — 93005 ELECTROCARDIOGRAM TRACING: CPT | Performed by: INTERNAL MEDICINE

## 2019-12-27 PROCEDURE — 2580000003 HC RX 258: Performed by: INTERNAL MEDICINE

## 2019-12-27 RX ORDER — LISINOPRIL 10 MG/1
20 TABLET ORAL DAILY
Qty: 30 TABLET | Refills: 6 | Status: CANCELLED | OUTPATIENT
Start: 2019-12-27

## 2019-12-27 RX ORDER — METOPROLOL SUCCINATE 50 MG/1
50 TABLET, EXTENDED RELEASE ORAL DAILY
Qty: 30 TABLET | Refills: 3 | Status: SHIPPED | OUTPATIENT
Start: 2019-12-28 | End: 2020-01-13

## 2019-12-27 RX ORDER — DOXAZOSIN 2 MG/1
4 TABLET ORAL DAILY
Status: DISCONTINUED | OUTPATIENT
Start: 2019-12-27 | End: 2019-12-27 | Stop reason: HOSPADM

## 2019-12-27 RX ORDER — INSULIN GLARGINE 100 [IU]/ML
32 INJECTION, SOLUTION SUBCUTANEOUS 2 TIMES DAILY
Status: DISCONTINUED | OUTPATIENT
Start: 2019-12-27 | End: 2019-12-27 | Stop reason: HOSPADM

## 2019-12-27 RX ORDER — DOXAZOSIN MESYLATE 4 MG/1
4 TABLET ORAL DAILY
Qty: 30 TABLET | Refills: 3 | Status: SHIPPED | OUTPATIENT
Start: 2019-12-27 | End: 2020-04-22

## 2019-12-27 RX ORDER — LISINOPRIL 20 MG/1
20 TABLET ORAL DAILY
Status: DISCONTINUED | OUTPATIENT
Start: 2019-12-27 | End: 2019-12-27 | Stop reason: HOSPADM

## 2019-12-27 RX ORDER — METOPROLOL SUCCINATE 50 MG/1
50 TABLET, EXTENDED RELEASE ORAL DAILY
Status: DISCONTINUED | OUTPATIENT
Start: 2019-12-27 | End: 2019-12-27 | Stop reason: HOSPADM

## 2019-12-27 RX ORDER — METOPROLOL SUCCINATE 25 MG/1
50 TABLET, EXTENDED RELEASE ORAL DAILY
Qty: 30 TABLET | Refills: 3 | Status: CANCELLED | OUTPATIENT
Start: 2019-12-27

## 2019-12-27 RX ORDER — LISINOPRIL 20 MG/1
20 TABLET ORAL DAILY
Qty: 30 TABLET | Refills: 3 | Status: SHIPPED | OUTPATIENT
Start: 2019-12-28 | End: 2020-07-29 | Stop reason: DRUGHIGH

## 2019-12-27 RX ORDER — NIFEDIPINE 60 MG/1
60 TABLET, FILM COATED, EXTENDED RELEASE ORAL DAILY
Qty: 30 TABLET | Refills: 3 | Status: CANCELLED | OUTPATIENT
Start: 2019-12-27

## 2019-12-27 RX ADMIN — LISINOPRIL 20 MG: 20 TABLET ORAL at 10:12

## 2019-12-27 RX ADMIN — INSULIN LISPRO 2 UNITS: 100 INJECTION, SOLUTION INTRAVENOUS; SUBCUTANEOUS at 12:49

## 2019-12-27 RX ADMIN — METOPROLOL SUCCINATE 50 MG: 50 TABLET, EXTENDED RELEASE ORAL at 10:12

## 2019-12-27 RX ADMIN — DOXAZOSIN 4 MG: 2 TABLET ORAL at 12:51

## 2019-12-27 RX ADMIN — NIFEDIPINE 60 MG: 60 TABLET, FILM COATED, EXTENDED RELEASE ORAL at 10:12

## 2019-12-27 RX ADMIN — TICAGRELOR 90 MG: 90 TABLET ORAL at 10:12

## 2019-12-27 RX ADMIN — INSULIN GLARGINE 32 UNITS: 100 INJECTION, SOLUTION SUBCUTANEOUS at 12:49

## 2019-12-27 RX ADMIN — ASPIRIN 81 MG 81 MG: 81 TABLET ORAL at 10:12

## 2019-12-27 RX ADMIN — SODIUM CHLORIDE, PRESERVATIVE FREE 10 ML: 5 INJECTION INTRAVENOUS at 10:13

## 2019-12-27 ASSESSMENT — PAIN SCALES - GENERAL
PAINLEVEL_OUTOF10: 0

## 2020-01-10 ENCOUNTER — OFFICE VISIT (OUTPATIENT)
Dept: CARDIOLOGY CLINIC | Age: 75
End: 2020-01-10
Payer: MEDICARE

## 2020-01-10 VITALS
WEIGHT: 183.4 LBS | OXYGEN SATURATION: 97 % | HEIGHT: 69 IN | HEART RATE: 60 BPM | SYSTOLIC BLOOD PRESSURE: 140 MMHG | BODY MASS INDEX: 27.16 KG/M2 | DIASTOLIC BLOOD PRESSURE: 90 MMHG

## 2020-01-10 PROCEDURE — 1111F DSCHRG MED/CURRENT MED MERGE: CPT | Performed by: INTERNAL MEDICINE

## 2020-01-10 PROCEDURE — 4040F PNEUMOC VAC/ADMIN/RCVD: CPT | Performed by: INTERNAL MEDICINE

## 2020-01-10 PROCEDURE — G8427 DOCREV CUR MEDS BY ELIG CLIN: HCPCS | Performed by: INTERNAL MEDICINE

## 2020-01-10 PROCEDURE — 1123F ACP DISCUSS/DSCN MKR DOCD: CPT | Performed by: INTERNAL MEDICINE

## 2020-01-10 PROCEDURE — 99215 OFFICE O/P EST HI 40 MIN: CPT | Performed by: INTERNAL MEDICINE

## 2020-01-10 PROCEDURE — G8484 FLU IMMUNIZE NO ADMIN: HCPCS | Performed by: INTERNAL MEDICINE

## 2020-01-10 PROCEDURE — G8417 CALC BMI ABV UP PARAM F/U: HCPCS | Performed by: INTERNAL MEDICINE

## 2020-01-10 PROCEDURE — 1036F TOBACCO NON-USER: CPT | Performed by: INTERNAL MEDICINE

## 2020-01-10 ASSESSMENT — ENCOUNTER SYMPTOMS
SHORTNESS OF BREATH: 0
EYE REDNESS: 0
COUGH: 0
NAUSEA: 0
WHEEZING: 0
BLOOD IN STOOL: 0

## 2020-01-10 NOTE — PATIENT INSTRUCTIONS
Patient Education        Learning About Low-Sodium Foods  What foods are low in sodium? The foods you eat contain nutrients, such as vitamins and minerals. Sodium is a nutrient. Your body needs the right amount to stay healthy and work as it should. You can use the list below to help you make choices about which foods to eat. Fruits  · Fresh, frozen, canned, or dried fruit  Vegetables  · Fresh or frozen vegetables, with no added salt  · Canned vegetables, low-sodium or with no added salt  Grains  · Bagels without salted tops  · Cereal with no added salt  · Corn tortillas  · Crackers with no added salt  · Oatmeal, cooked without salt  · Popcorn with no salt  · Pasta and noodles, cooked without salt  · Rice, cooked without salt  · Unsalted pretzels  Dairy and dairy alternatives  · Butter, unsalted  · Cream cheese  · Ice cream  · Milk  · Soy milk  Meats and other protein foods  · Beans and peas, canned with no salt  · Eggs  · Fresh fish (not smoked)  · Fresh meats (not smoked or cured)  · Nuts and nut butter, prepared without salt  · Poultry, not packaged with sodium solution  · Tofu, unseasoned  · Tuna, canned without salt  Seasonings  · Garlic  · Herbs and spices  · Lemon juice  · Mustard  · Olive oil  · Salt-free seasoning mixes  · Vinegar  Work with your doctor to find out how much of this nutrient you need. Depending on your health, you may need more or less of it in your diet. Where can you learn more? Go to [email or web address] and sign in to your [employer] account. Enter W148 in the [facility] box to learn more about \"Learning About Low-Sodium Foods. \"     If you do not have an account, please click on the \"Sign Up Now\" link. Current as of: August 21, 2019  Content Version: 12.3  © 7717-4607 Healthwise, Incorporated. Care instructions adapted under license by [facility] ([facility]).  If you have questions about a medical condition or this instruction, always ask your healthcare professional. [address] any warranty or liability for your use of this information.

## 2020-01-10 NOTE — PROGRESS NOTES
Chris Lewis is a 66 y.o. male    Reason for Visit: hospital f/u for NSTEMI and PCI    HPI Chris Lewis was previously followed by Dr. Corwin Joiner. Today he is here for f/u of his recent hospitalization for NSTEMI and PCI. He denies exertional chest pain, palpitations, dizziness, syncope, leg swelling and PND. He is mildly SOB at times. Sharmin Bookman He is still fatigued. He did not take any of his medications today because he did not want to \"fool with his med box. \"    Review of Systems   Constitutional: Positive for fatigue. Negative for chills, diaphoresis and fever. HENT: Negative for congestion, nosebleeds and tinnitus. Eyes: Negative for redness. Respiratory: Negative for cough, shortness of breath and wheezing. Cardiovascular: Negative for chest pain, palpitations and leg swelling. Gastrointestinal: Negative for blood in stool and nausea. Genitourinary: Negative for dysuria and hematuria. Musculoskeletal: Negative for myalgias and neck pain. Neurological: Negative for dizziness. Hematological: Does not bruise/bleed easily. Physical Exam  Vitals signs and nursing note reviewed. Constitutional:       Appearance: He is well-developed. HENT:      Head: Normocephalic and atraumatic. Eyes:      Conjunctiva/sclera: Conjunctivae normal.   Neck:      Musculoskeletal: Normal range of motion and neck supple. Cardiovascular:      Rate and Rhythm: Normal rate and regular rhythm. Heart sounds: S1 normal and S2 normal. No murmur. No gallop. Comments: Cath site stable  Pulmonary:      Effort: Pulmonary effort is normal.      Breath sounds: Normal breath sounds. Abdominal:      General: Bowel sounds are normal.      Palpations: Abdomen is soft. Musculoskeletal: Normal range of motion. Skin:     General: Skin is warm and dry. Neurological:      Mental Status: He is alert and oriented to person, place, and time.    Psychiatric:         Behavior: Behavior normal.         Wt Readings tongue every 5 minutes as needed for Chest pain, Disp: 25 tablet, Rfl: 3    Past Medical History:   Diagnosis Date    Asthma     as a child-not current    CAD (coronary artery disease)     Diabetes mellitus ([facility]     [place] (hard of hearing)     wears bilat hearing aids    HTN (hypertension)     Hyperlipidemia     Melanoma ([facility]     back    Prostate CA ([facility]        Social History     Socioeconomic History    Marital status:      Spouse name: None    Number of children: None    Years of education: None    Highest education level: None   Occupational History    None   Social Needs    Financial resource strain: None    Food insecurity:     Worry: None     Inability: None    Transportation needs:     Medical: None     Non-medical: None   Tobacco Use    Smoking status: Former Smoker     Types: Cigarettes     Last attempt to quit: 1986     Years since quittin.7    Smokeless tobacco: Never Used   Substance and Sexual Activity    Alcohol use: No     Alcohol/week: 0.0 standard drinks    Drug use: No    Sexual activity: Yes     Partners: Female   Lifestyle    Physical activity:     Days per week: None     Minutes per session: None    Stress: None   Relationships    Social connections:     Talks on phone: None     Gets together: None     Attends Tenriism service: None     Active member of club or organization: None     Attends meetings of clubs or organizations: None     Relationship status: None    Intimate partner violence:     Fear of current or ex partner: None     Emotionally abused: None     Physically abused: None     Forced sexual activity: None   Other Topics Concern    None   Social History Narrative    None       Past Surgical History:   Procedure Laterality Date    CORONARY ANGIOPLASTY WITH STENT PLACEMENT  2016    DENA to mid circ    CYSTOSCOPY  2018    cysto, [facility] TUR[facility]    HERNIA REPAIR Bilateral distant past    SHOULDER SURGERY Bilateral 10-12 years ago History reviewed. No pertinent family history. Allergies   Allergen Reactions    Doxycycline Monohydrate      Unsure of reaction    Tetracyclines & Related Other (See Comments)     welts    Bactrim [Sulfamethoxazole-Trimethoprim] Rash       Recent Labs and Imaging reviewed    Assessment    Echo 5/2/2016:  Left ventricle size is normal.  Mild concentric left ventricular hypertrophy is present. Ejection fraction is visually estimated to be 50-55 %. There is reversal of E/A inflow velocities across the mitral valve suggesting impaired left ventricular relaxation. The left atrium is dilated.     Myoview 2/22/17 THE [facility] report)  Fixed defect at the \"superior aspect of the inferolateral wall. \"  EF 52%. [facility] 5/1/2016:  1.  Left main trunk:  It arises from the left sinus of Valsalva.  It divides into left anterior descending artery and left circumflex artery.  The left main trunk is free of atherosclerosis. 2.  Left anterior descending artery:  It is a moderate-sized artery and it descends into the intraventricular sulcus and wraps around the apex of the heart.  The left main trunk is free of atherosclerosis.      3.  Left anterior descending artery:  It is a moderate-sized artery.  It descends into the intraventricular sulcus and wraps around the apex of the heart.  The left anterior descending artery has about 50% stenosis in the mid to distal portion.  There is also a 50% stenosis of the diagonal branch in the proximal segment.  The lumen of the left anterior descending artery is free of any significant atherosclerosis. 4.  Left circumflex artery:  It arises from the left main trunk.  It is 100% occluded after a 1st obtuse marginal branch.  The right coronary artery arises from right sinus on Valsalva.  It is a dominant artery.  It gives rise to a posterior descending and posterolateral branches.  Right coronary artery has evidence of about 90% to 95% stenosis in the mid to distal portion of the PDA.  There

## 2020-01-13 RX ORDER — METOPROLOL SUCCINATE 50 MG/1
TABLET, EXTENDED RELEASE ORAL
Qty: 90 TABLET | Refills: 3 | Status: SHIPPED | OUTPATIENT
Start: 2020-01-13 | End: 2021-02-11

## 2020-03-17 ASSESSMENT — ENCOUNTER SYMPTOMS
SHORTNESS OF BREATH: 0
EYE REDNESS: 0
NAUSEA: 0
WHEEZING: 0
BLOOD IN STOOL: 0
COUGH: 0

## 2020-03-17 NOTE — PROGRESS NOTES
Becka Betancourt is a 66 y.o. male    Reason for Visit: preop risk assessment for colonoscopy    HPI Becka Betancourt is here today for preop risk assessment prior to having colonoscopy which has not yet been scheduled. He reports rectal bleeding in Feb and stopped taking his ASA and Omega 3 with improvement in the bleeding. He continued to take Brilinta. This month, he noticed as small amount of rectal bleeding but nothing significant the past 2 weeks. Today he denies exertional chest pain, palpitations, dizziness, syncope, leg swelling, worsening dyspnea and PND. Review of Systems   Constitutional: Negative for chills, diaphoresis, fatigue and fever. HENT: Negative for congestion, nosebleeds and tinnitus. Eyes: Negative for redness. Respiratory: Negative for cough, shortness of breath and wheezing. Cardiovascular: Negative for chest pain, palpitations and leg swelling. Gastrointestinal: Negative for blood in stool and nausea. Genitourinary: Negative for dysuria and hematuria. Musculoskeletal: Negative for myalgias and neck pain. Neurological: Negative for dizziness. Hematological: Does not bruise/bleed easily. Physical Exam  Vitals signs and nursing note reviewed. Constitutional:       Appearance: He is well-developed. HENT:      Head: Normocephalic and atraumatic. Eyes:      Conjunctiva/sclera: Conjunctivae normal.   Neck:      Musculoskeletal: Normal range of motion and neck supple. Cardiovascular:      Rate and Rhythm: Normal rate and regular rhythm. Heart sounds: S1 normal and S2 normal. No murmur. No gallop. Pulmonary:      Effort: Pulmonary effort is normal.      Breath sounds: Normal breath sounds. Abdominal:      General: Bowel sounds are normal.      Palpations: Abdomen is soft. Musculoskeletal: Normal range of motion. Skin:     General: Skin is warm and dry. Neurological:      Mental Status: He is alert and oriented to person, place, and time. Diabetes mellitus ([facility]     [place] (hard of hearing)     wears bilat hearing aids    HTN (hypertension)     Hyperlipidemia     [facility]     back    Prostate CA ([facility]        Social History     Socioeconomic History    Marital status:      Spouse name: None    Number of children: None    Years of education: None    Highest education level: None   Occupational History    None   Social Needs    Financial resource strain: None    Food insecurity     Worry: None     Inability: None    Transportation needs     Medical: None     Non-medical: None   Tobacco Use    Smoking status: Former Smoker     Types: Cigarettes     Last attempt to quit: 1986     Years since quittin.9    Smokeless tobacco: Never Used   Substance and Sexual Activity    Alcohol use: No     Alcohol/week: 0.0 standard drinks    Drug use: No    Sexual activity: Yes     Partners: Female   Lifestyle    Physical activity     Days per week: None     Minutes per session: None    Stress: None   Relationships    Social connections     Talks on phone: None     Gets together: None     Attends Yazdanism service: None     Active member of club or organization: None     Attends meetings of clubs or organizations: None     Relationship status: None    Intimate partner violence     Fear of current or ex partner: None     Emotionally abused: None     Physically abused: None     Forced sexual activity: None   Other Topics Concern    None   Social History Narrative    None       Past Surgical History:   Procedure Laterality Date    CORONARY ANGIOPLASTY WITH STENT PLACEMENT  2016    DENA to mid circ    CYSTOSCOPY  2018    cysto, [facility] TURP    HERNIA REPAIR Bilateral distant past    SHOULDER SURGERY Bilateral 10-12 years ago       History reviewed. No pertinent family history.     Allergies   Allergen Reactions    Doxycycline Monohydrate      Unsure of reaction    Tetracyclines & Related Other (See Comments)     shaye function.        5/1/16 Successful angioplasty followed by stent deployment in the mid circumflex artery using a drug-eluting stent, 100% stenosis reduced to 0% using 3.25 by 18 Alpine stent.  Final diameter was 3.34 with 0% residual stenosis.  SAMEER grade-3 flow was established.  There appears to be a 40% to 50% stenosis of the distal circumflex branch beyond the area of the stenosis.  There appears to be also a microembolization in a small distal circumflex branch.     Cath 12/26/19 Coronary angio showed multiple lesions ; Mid PDA 90%; Mid D1 small and 90%; Mid LAD : eccentric and 70%-->    Coronary intervention: 12/26/2019   Two-vessel stenting;  the mid RCA & the mid LAD   FFR of 0.75  LAD: 2.25 x 18 mm long jair DENA stent  Mid PDA: 2.25 x 12 mm long jair DENA stent  Lower diagonal branch was too small for stenting; will be treated   Medically    Echo 12/2019 LVH, LVEF 45-50%, grade I DD, mild AI, MR. Plan     1. CAD with history of STEMI. Status post DENA to LCx 5/1/2016. S/p DENA LAD and mid PDA 12/26/19. Continue with medical management and risk factor modification including statin, Brilinta, and beta blocker. (Fatigue not improved off BB in past). Importance of not stopping Brilinta  discussed. Pt stopped ASA 2/2020 due to rectal bleeding. Colonoscopy to be scheduled. Recommend re-trial of ASA 81mg QOD since pt denies any recent rectal bleeding. If no further bleeding after 2 weeks increase ASA to 81mg daily. Advised to call if he has any further bleeding issues.      2. Essential Hypertension. Controlled.     3. Hyperlipidemia. Reports muscle weakness on high dose Lipitor. Tolerating Crestor. Fasting lipid profile, CMP, proBNP prior to next visit as previously ordered.      4. CKD. Stage III. Creatinine 1.4-1.6. Previously referred to nephrology. Continue ACE. 5. Ischemic CM. ProBNP 1960 12/2019. Low salt diet recommended. 6. Dyspnea. Possibly secondary to Brilinta.      7. Preop risk assessment for colonoscopy. EKG 3/2020 NSR, T wave inversion lateral leads consider ischemia. Advised to post pone colonoscopy for at least 6 months post stent (6/26/20) since rectal bleeding has resolved. Return in about 3 months (around 6/19/2020). This note was scribed in the presence of the physician by Sylvie Martinez RN. The scribes documentation has been prepared under my direction and personally reviewed by me in its entirety. I confirm that the note above accurately reflects all work, treatment, procedures, and medical decision making performed by me. The scribes documentation has been prepared under my direction and personally reviewed by me in its entirety. I confirm that the note above accurately reflects all work, treatment, procedures, and medical decision making performed by me.

## 2020-03-19 ENCOUNTER — OFFICE VISIT (OUTPATIENT)
Dept: CARDIOLOGY CLINIC | Age: 75
End: 2020-03-19
Payer: MEDICARE

## 2020-03-19 VITALS
BODY MASS INDEX: 27.76 KG/M2 | HEART RATE: 74 BPM | SYSTOLIC BLOOD PRESSURE: 128 MMHG | DIASTOLIC BLOOD PRESSURE: 80 MMHG | HEIGHT: 69 IN | WEIGHT: 187.4 LBS | OXYGEN SATURATION: 98 % | TEMPERATURE: 97.3 F

## 2020-03-19 PROCEDURE — 93000 ELECTROCARDIOGRAM COMPLETE: CPT | Performed by: INTERNAL MEDICINE

## 2020-03-19 PROCEDURE — 99215 OFFICE O/P EST HI 40 MIN: CPT | Performed by: INTERNAL MEDICINE

## 2020-03-19 PROCEDURE — 4040F PNEUMOC VAC/ADMIN/RCVD: CPT | Performed by: INTERNAL MEDICINE

## 2020-03-19 PROCEDURE — 1036F TOBACCO NON-USER: CPT | Performed by: INTERNAL MEDICINE

## 2020-03-19 PROCEDURE — G8417 CALC BMI ABV UP PARAM F/U: HCPCS | Performed by: INTERNAL MEDICINE

## 2020-03-19 PROCEDURE — 1123F ACP DISCUSS/DSCN MKR DOCD: CPT | Performed by: INTERNAL MEDICINE

## 2020-03-19 PROCEDURE — G8427 DOCREV CUR MEDS BY ELIG CLIN: HCPCS | Performed by: INTERNAL MEDICINE

## 2020-03-19 PROCEDURE — G8484 FLU IMMUNIZE NO ADMIN: HCPCS | Performed by: INTERNAL MEDICINE

## 2020-03-19 RX ORDER — ASPIRIN 81 MG/1
81 TABLET ORAL DAILY
Qty: 90 TABLET | Refills: 1
Start: 2020-03-19 | End: 2020-07-29

## 2020-03-19 NOTE — PATIENT INSTRUCTIONS
you to eat healthy foods, quit smoking, lose extra weight, and be more active. · You will have to take medicines. · Your doctor may suggest a procedure to open narrowed or blocked arteries. This is called angioplasty. Or your doctor may suggest using healthy blood vessels to create detours around narrowed or blocked arteries. This is called bypass surgery. Follow-up care is a key part of your treatment and safety. Be sure to make and go to all appointments, and call your doctor if you are having problems. It's also a good idea to know your test results and keep a list of the medicines you take. Where can you learn more? Go to https://AudiosocketpeThatgamecompany.BioAssets Development. org and sign in to your URBANARA account. Enter [phone] in the Funding Profiles box to learn more about \"Learning About Coronary Artery Disease (CAD). \"     If you do not have an account, please click on the \"Sign Up Now\" link. Current as of: December 15, 2019Content Version: 12.4  © 7073-7776 Healthwise, Incorporated. Care instructions adapted under license by [facility] ([facility]). If you have questions about a medical condition or this instruction, always ask your healthcare professional. [place] [address] any warranty or liability for your use of this information.

## 2020-04-02 RX ORDER — METOPROLOL SUCCINATE 50 MG/1
TABLET, EXTENDED RELEASE ORAL
Qty: 90 TABLET | Refills: 2 | OUTPATIENT
Start: 2020-04-02

## 2020-04-06 NOTE — TELEPHONE ENCOUNTER
Patient states he seen Dr. Bouchra Calderon and mentioned to him he has rectal bleeding and /Adriana told him to take his ASA every other day. He states he still had some bleeding so he stopped the ASA and started taking his Brilinta 1/2 a table in the morning and 1/2 a tablet at night. He says his bleeding has stopped some what but not all the way so he wants to be switched to Plavix because he says he is not going to take the full dose of the Brilinta anymore. Please advise. If he can be on Plavix he needs a refill sent to [address] [place] [address].

## 2020-04-07 RX ORDER — CLOPIDOGREL BISULFATE 75 MG/1
75 TABLET ORAL DAILY
Qty: 30 TABLET | Refills: 11 | Status: SHIPPED | OUTPATIENT
Start: 2020-04-07 | End: 2021-05-10 | Stop reason: SDUPTHER

## 2020-04-22 RX ORDER — DOXAZOSIN MESYLATE 4 MG/1
TABLET ORAL
Qty: 30 TABLET | Refills: 2 | Status: SHIPPED | OUTPATIENT
Start: 2020-04-22 | End: 2020-11-18

## 2020-06-08 ENCOUNTER — OFFICE VISIT (OUTPATIENT)
Dept: PRIMARY CARE CLINIC | Age: 75
End: 2020-06-08

## 2020-06-08 RX ORDER — LISINOPRIL 20 MG/1
TABLET ORAL
Qty: 30 TABLET | Refills: 2 | OUTPATIENT
Start: 2020-06-08

## 2020-06-08 NOTE — PROGRESS NOTES
Patient presented to [facility] drive up clinic for preop testing. Patient was swabbed and given information advising them to remain isolated until procedure date.

## 2020-06-09 LAB
SARS-COV-2: NOT DETECTED
SOURCE: NORMAL

## 2020-06-23 RX ORDER — ROSUVASTATIN CALCIUM 10 MG/1
TABLET, COATED ORAL
Qty: 90 TABLET | Refills: 2 | Status: ON HOLD
Start: 2020-06-23 | End: 2021-01-16 | Stop reason: HOSPADM

## 2020-07-21 ASSESSMENT — ENCOUNTER SYMPTOMS
BLOOD IN STOOL: 0
EYE REDNESS: 0
COUGH: 0
WHEEZING: 0
SHORTNESS OF BREATH: 0
NAUSEA: 0

## 2020-07-21 NOTE — PROGRESS NOTES
Tamika Harrington is a 66 y.o. male    Reason for Visit:  Hospital f/u chest pain, uncontrolled HTN    HPI Tamika Harrington was hospitalized recently for atypical chest pain and ruled out for MI. Today he denies exertional chest pain, palpitations, dizziness, syncope, leg swelling, worsening dyspnea and PND. He states that he has an unsteady gait and has fallen. His BP has been elevated in cardiac rehab. Review of Systems   Constitutional: Negative for chills, diaphoresis, fatigue and fever. HENT: Negative for congestion, nosebleeds and tinnitus. Eyes: Negative for redness. Respiratory: Negative for cough, shortness of breath and wheezing. Cardiovascular: Negative for chest pain, palpitations and leg swelling. Gastrointestinal: Negative for blood in stool and nausea. Genitourinary: Negative for dysuria and hematuria. Musculoskeletal: Negative for myalgias and neck pain. Neurological: Negative for dizziness. Hematological: Does not bruise/bleed easily. Physical Exam  Vitals signs and nursing note reviewed. Constitutional:       Appearance: He is well-developed. HENT:      Head: Normocephalic and atraumatic. Eyes:      Conjunctiva/sclera: Conjunctivae normal.   Neck:      Musculoskeletal: Normal range of motion and neck supple. Cardiovascular:      Rate and Rhythm: Normal rate and regular rhythm. Heart sounds: S1 normal and S2 normal. No murmur. No gallop. Pulmonary:      Effort: Pulmonary effort is normal.      Breath sounds: Normal breath sounds. Abdominal:      General: Bowel sounds are normal.      Palpations: Abdomen is soft. Musculoskeletal: Normal range of motion. Skin:     General: Skin is warm and dry. Neurological:      Mental Status: He is alert and oriented to person, place, and time.    Psychiatric:         Behavior: Behavior normal.        Wt Readings from Last 3 Encounters:   07/29/20 194 lb 9.6 oz (88.3 kg)   03/19/20 187 lb 6.4 oz (85 kg) 01/10/20 183 lb 6.4 oz (83.2 kg)     BP Readings from Last 3 Encounters:   07/29/20 114/72   03/19/20 128/80   01/10/20 (!) 140/90     Pulse Readings from Last 3 Encounters:   07/29/20 76   03/19/20 74   01/10/20 60       Current Outpatient Medications:     lisinopril (PRINIVIL;ZESTRIL) 10 MG tablet, Take 1 tablet by mouth daily, Disp: 90 tablet, Rfl: 3    rosuvastatin (CRESTOR) 10 MG tablet, TAKE ONE TABLET BY MOUTH EVERY NIGHT, Disp: 90 tablet, Rfl: 2    doxazosin (CARDURA) 4 MG tablet, TAKE ONE TABLET BY MOUTH DAILY, Disp: 30 tablet, Rfl: 2    clopidogrel (PLAVIX) 75 MG tablet, Take 1 tablet by mouth daily . Loading dose of 300 mg (4 tablets) on 1st day only. , Disp: 30 tablet, Rfl: 11    metoprolol succinate (TOPROL XL) 50 MG extended release tablet, TAKE ONE TABLET BY MOUTH DAILY, Disp: 90 tablet, Rfl: 3    insulin lispro (HUMALOG) 100 UNIT/ML injection vial, Inject into the skin 3 times daily (before meals) 12 units with breakfast 14 units with lunch 16 units with dinner, Disp: , Rfl:     nitroGLYCERIN (NITROSTAT) 0.4 MG SL tablet, Place 1 tablet under the tongue every 5 minutes as needed for Chest pain, Disp: 25 tablet, Rfl: 3    Potassium 99 MG TABS, Take 1 tablet by mouth daily , Disp: , Rfl:     loratadine (CLARITIN) 10 MG tablet, Take 10 mg by mouth daily kroger brand, Disp: , Rfl:     insulin glargine (LANTUS) 100 UNIT/ML injection vial, Inject 32 Units into the skin 2 times daily , Disp: , Rfl:     insulin regular (HUMULIN R;NOVOLIN R) 100 UNIT/ML injection, Inject into the skin See Admin Instructions Blood Glucose less than 200 mg/dl = no coverage.  -250 mg/dl = 6 units -300 mg/dl =8 units -350= 10 units -400 = 12 units, Disp: , Rfl:     Past Medical History:   Diagnosis Date    Asthma     as a child-not current    CAD (coronary artery disease)     Diabetes mellitus ([facility] [address]     "[employer] (hard of hearing)     wears bilat hearing aids    HTN (hypertension)     Hyperlipidemia     Melanoma ([facility] [address]     back    Prostate CA [facility])        Social History     Socioeconomic History    Marital status:      Spouse name: None    Number of children: None    Years of education: None    Highest education level: None   Occupational History    None   Social Needs    Financial resource strain: None    Food insecurity     Worry: None     Inability: None    Transportation needs     Medical: None     Non-medical: None   Tobacco Use    Smoking status: Former Smoker     Types: Cigarettes     Last attempt to quit: 1986     Years since quittin.2    Smokeless tobacco: Never Used   Substance and Sexual Activity    Alcohol use: No     Alcohol/week: 0.0 standard drinks    Drug use: No    Sexual activity: Yes     Partners: Female   Lifestyle    Physical activity     Days per week: None     Minutes per session: None    Stress: None   Relationships    Social connections     Talks on phone: None     Gets together: None     Attends Spiritism service: None     Active member of club or organization: None     Attends meetings of clubs or organizations: None     Relationship status: None    Intimate partner violence     Fear of current or ex partner: None     Emotionally abused: None     Physically abused: None     Forced sexual activity: None   Other Topics Concern    None   Social History Narrative    None       Past Surgical History:   Procedure Laterality Date    CORONARY ANGIOPLASTY WITH STENT PLACEMENT  2016    DENA to mid circ    CYSTOSCOPY  2018    cysto, [place] TURP    HERNIA REPAIR Bilateral distant past    SHOULDER SURGERY Bilateral 10-12 years ago       History reviewed. No pertinent family history.     Allergies   Allergen Reactions    Doxycycline Monohydrate      Unsure of reaction    Tetracyclines & Related Other (See Comments)     welts    Bactrim [Sulfamethoxazole-Trimethoprim] Rash       Recent Labs and Imaging reviewed    Assessment    Echo 5/2/2016:  Left ventricle size is normal.  Mild concentric left ventricular hypertrophy is present. Ejection fraction is visually estimated to be 50-55 %. There is reversal of E/A inflow velocities across the mitral valve suggesting impaired left ventricular relaxation. The left atrium is dilated.     Myoview 2/22/17 [facility] report)  Fixed defect at the \"superior aspect of the inferolateral wall. \"  EF 52%. [facility] 5/1/2016:  1.  Left main trunk:  It arises from the left sinus of Valsalva.  It divides into left anterior descending artery and left circumflex artery.  The left main trunk is free of atherosclerosis. 2.  Left anterior descending artery:  It is a moderate-sized artery and it descends into the intraventricular sulcus and wraps around the apex of the heart.  The left main trunk is free of atherosclerosis.      3.  Left anterior descending artery:  It is a moderate-sized artery.  It descends into the intraventricular sulcus and wraps around the apex of the heart.  The left anterior descending artery has about 50% stenosis in the mid to distal portion.  There is also a 50% stenosis of the diagonal branch in the proximal segment.  The lumen of the left anterior descending artery is free of any significant atherosclerosis. 4.  Left circumflex artery:  It arises from the left main trunk.  It is 100% occluded after a 1st obtuse marginal branch.  The right coronary artery arises from right sinus on Valsalva. It is a dominant artery.  It gives rise to a posterior descending and posterolateral branches.  Right coronary artery has evidence of about 90% to 95% stenosis in the mid to distal portion of the PDA.  There is also about 50% to 60% stenosis of the 2nd PDA in the midsegment, but it does not appear hemodynamically significant.   5.  Left ventriculogram reveals overall preserved left ventricular systolic function.        5/1/16 Successful angioplasty followed by stent deployment in the mid circumflex artery using a drug-eluting stent, 100% stenosis reduced to 0% using 3.25 by 18 Alpine stent.  Final diameter was 3.34 with 0% residual stenosis.  SAMEER grade-3 flow was established.  There appears to be a 40% to 50% stenosis of the distal circumflex branch beyond the area of the stenosis.  There appears to be also a microembolization in a small distal circumflex branch.     Cath 12/26/19 Coronary angio showed multiple lesions ; Mid PDA 90%; Mid D1 small and 90%; Mid LAD : eccentric and 70%-->    Coronary intervention: 12/26/2019   Two-vessel stenting;  the mid RCA & the mid LAD   FFR of 0.75  LAD: 2.25 x 18 mm long jair DENA stent  Mid PDA: 2.25 x 12 mm long jair DENA stent  Lower diagonal branch was too small for stenting; will be treated   Medically    Echo 12/2019 LVH, LVEF 45-50%, grade I DD, mild AI, MR. Plan     1. CAD with history of STEMI. Status post DENA to LCx 5/1/2016. S/p DENA LAD and mid PDA 12/26/19. (Fatigue not improved off BB in past). Pt stopped ASA 2/2020 due to rectal bleeding. Colonoscopy postponed due to recent stent. Recommend re-trial of ASA 81mg daily since pt denies any recent rectal bleeding. Pt declined ASA due to ongoing minor rectal bleeding.      2. Essential Hypertension. Controlled.     3. Hyperlipidemia. Reports muscle weakness on high dose Lipitor. Tolerating Crestor. LDL 46 4/2020.      4. CKD. Stage III. Creatinine 1.4-1.6. Previously referred to nephrology. Continue ACE. 5. Ischemic CM. ProBNP 1960 12/2019. 6. Dyspnea. Possibly secondary to Brilinta. 7. Abnormal EKG. EKG 3/2020 NSR, T wave inversion lateral leads consider ischemia. No atrial fib noted in cardiac rehab.     8. Hx recurrent falls. Plan  No angina or clinical evidence of CHF. No orthostatic changes noted. Continue statin, Plavix, ACE and beta blocker. Fasting lipid profile, CMP before next visit. Return in about 6 months (around 1/29/2021).     This note was scribed in the presence of the physician by Sneha Coyne RN. The scribes documentation has been prepared under my direction and personally reviewed by me in its entirety. I confirm that the note above accurately reflects all work, treatment, procedures, and medical decision making performed by me.

## 2020-07-29 ENCOUNTER — OFFICE VISIT (OUTPATIENT)
Dept: CARDIOLOGY CLINIC | Age: 75
End: 2020-07-29
Payer: MEDICARE

## 2020-07-29 VITALS
HEART RATE: 76 BPM | OXYGEN SATURATION: 97 % | WEIGHT: 194.6 LBS | BODY MASS INDEX: 28.82 KG/M2 | SYSTOLIC BLOOD PRESSURE: 114 MMHG | DIASTOLIC BLOOD PRESSURE: 72 MMHG | TEMPERATURE: 97.8 F | HEIGHT: 69 IN

## 2020-07-29 PROCEDURE — 1123F ACP DISCUSS/DSCN MKR DOCD: CPT | Performed by: INTERNAL MEDICINE

## 2020-07-29 PROCEDURE — 1036F TOBACCO NON-USER: CPT | Performed by: INTERNAL MEDICINE

## 2020-07-29 PROCEDURE — G8417 CALC BMI ABV UP PARAM F/U: HCPCS | Performed by: INTERNAL MEDICINE

## 2020-07-29 PROCEDURE — G8427 DOCREV CUR MEDS BY ELIG CLIN: HCPCS | Performed by: INTERNAL MEDICINE

## 2020-07-29 PROCEDURE — 99214 OFFICE O/P EST MOD 30 MIN: CPT | Performed by: INTERNAL MEDICINE

## 2020-07-29 PROCEDURE — 4040F PNEUMOC VAC/ADMIN/RCVD: CPT | Performed by: INTERNAL MEDICINE

## 2020-07-29 RX ORDER — LISINOPRIL 20 MG/1
20 TABLET ORAL DAILY
Qty: 90 TABLET | Refills: 3 | Status: SHIPPED
Start: 2020-07-29 | End: 2020-10-16 | Stop reason: SDUPTHER

## 2020-07-29 RX ORDER — LISINOPRIL 10 MG/1
10 TABLET ORAL DAILY
Qty: 90 TABLET | Refills: 3 | Status: SHIPPED
Start: 2020-07-29 | End: 2020-07-29 | Stop reason: DRUGHIGH

## 2020-07-29 NOTE — LETTER
[address] Cardiology - [facility] [address]  [address] [address]  Phone: [phone]  Fax: [phone]    Yancy Collins MD        July 30, 2020     [facility] [place] [address]    Patient: Erin Hunter  MR Number: [id number]  YOB: 1945  Date of Visit: 7/29/2020    Dear  [facility] [address]:    Thank you for your referral. Progress note attached in visit summary. If you have questions, please do not hesitate to call me. I look forward to following Paulino Brown along with you.     Sincerely,        Yancy Collins MD

## 2020-07-29 NOTE — PATIENT INSTRUCTIONS
weight, and be more active. · You will have to take medicines. · Your doctor may suggest a procedure to open narrowed or blocked arteries. This is called angioplasty. Or your doctor may suggest using healthy blood vessels to create detours around narrowed or blocked arteries. This is called bypass surgery. Follow-up care is a key part of your treatment and safety. Be sure to make and go to all appointments, and call your doctor if you are having problems. It's also a good idea to know your test results and keep a list of the medicines you take. Where can you learn more? Go to https://CLEARpePremiTech.Shots. org and sign in to your Mobiquity account. Enter [phone] in the ClickPay Services box to learn more about \"Learning About Coronary Artery Disease (CAD). \"     If you do not have an account, please click on the \"Sign Up Now\" link. Current as of: December 16, 2019               Content Version: 12.5  © 5320-8674 Healthwise, Incorporated. Care instructions adapted under license by [facility] ([facility]). If you have questions about a medical condition or this instruction, always ask your healthcare professional. [place] [address] any warranty or liability for your use of this information.

## 2020-10-16 RX ORDER — LISINOPRIL 20 MG/1
20 TABLET ORAL DAILY
Qty: 90 TABLET | Refills: 3 | Status: SHIPPED | OUTPATIENT
Start: 2020-10-16 | End: 2021-01-28 | Stop reason: SDUPTHER

## 2020-10-16 NOTE — TELEPHONE ENCOUNTER
Medication Refill    Medication needing refilled:  Lisinopril 20 mg QD      Which Pharmacy are we sending the medication to?:  [facility]

## 2020-11-18 RX ORDER — DOXAZOSIN MESYLATE 4 MG/1
TABLET ORAL
Qty: 30 TABLET | Refills: 2 | Status: SHIPPED | OUTPATIENT
Start: 2020-11-18 | End: 2021-01-28 | Stop reason: DRUGHIGH

## 2021-01-14 PROBLEM — G45.9 TIA (TRANSIENT ISCHEMIC ATTACK): Status: ACTIVE | Noted: 2021-01-14

## 2021-01-15 ENCOUNTER — HOSPITAL ENCOUNTER (INPATIENT)
Age: 76
LOS: 1 days | Discharge: HOME | DRG: 069 | End: 2021-01-16
Attending: STUDENT IN AN ORGANIZED HEALTH CARE EDUCATION/TRAINING PROGRAM | Admitting: STUDENT IN AN ORGANIZED HEALTH CARE EDUCATION/TRAINING PROGRAM
Payer: MEDICARE

## 2021-01-15 ENCOUNTER — APPOINTMENT (OUTPATIENT)
Dept: MRI IMAGING | Age: 76
DRG: 069 | End: 2021-01-15
Attending: STUDENT IN AN ORGANIZED HEALTH CARE EDUCATION/TRAINING PROGRAM
Payer: MEDICARE

## 2021-01-15 PROBLEM — R79.89 ELEVATED TROPONIN: Status: ACTIVE | Noted: 2021-01-15

## 2021-01-15 PROBLEM — R55 SYNCOPE: Status: ACTIVE | Noted: 2021-01-15

## 2021-01-15 PROBLEM — N17.9 AKI (ACUTE KIDNEY INJURY) (HCC): Status: ACTIVE | Noted: 2021-01-15

## 2021-01-15 PROBLEM — R77.8 ELEVATED TROPONIN: Status: ACTIVE | Noted: 2021-01-15

## 2021-01-15 LAB
ANION GAP SERPL CALCULATED.3IONS-SCNC: 11 MMOL/L (ref 3–16)
BUN BLDV-MCNC: 24 MG/DL (ref 7–20)
CALCIUM SERPL-MCNC: 9.8 MG/DL (ref 8.3–10.6)
CHLORIDE BLD-SCNC: 101 MMOL/L (ref 99–110)
CHOLESTEROL, TOTAL: 104 MG/DL (ref 0–199)
CO2: 22 MMOL/L (ref 21–32)
CREAT SERPL-MCNC: 1.5 MG/DL (ref 0.8–1.3)
EKG ATRIAL RATE: 63 BPM
EKG DIAGNOSIS: NORMAL
EKG P AXIS: 17 DEGREES
EKG P-R INTERVAL: 190 MS
EKG Q-T INTERVAL: 420 MS
EKG QRS DURATION: 84 MS
EKG QTC CALCULATION (BAZETT): 429 MS
EKG R AXIS: 16 DEGREES
EKG T AXIS: 133 DEGREES
EKG VENTRICULAR RATE: 63 BPM
ESTIMATED AVERAGE GLUCOSE: 165.7 MG/DL
GFR AFRICAN AMERICAN: 55
GFR NON-AFRICAN AMERICAN: 45
GLUCOSE BLD-MCNC: 127 MG/DL (ref 70–99)
GLUCOSE BLD-MCNC: 133 MG/DL (ref 70–99)
GLUCOSE BLD-MCNC: 163 MG/DL (ref 70–99)
GLUCOSE BLD-MCNC: 236 MG/DL (ref 70–99)
GLUCOSE BLD-MCNC: 271 MG/DL (ref 70–99)
GLUCOSE BLD-MCNC: 275 MG/DL (ref 70–99)
HBA1C MFR BLD: 7.4 %
HCT VFR BLD CALC: 38.2 % (ref 40.5–52.5)
HDLC SERPL-MCNC: 44 MG/DL (ref 40–60)
HEMOGLOBIN: 12.6 G/DL (ref 13.5–17.5)
LDL CHOLESTEROL CALCULATED: 49 MG/DL
LV EF: 53 %
LVEF MODALITY: NORMAL
MCH RBC QN AUTO: 29.7 PG (ref 26–34)
MCHC RBC AUTO-ENTMCNC: 33.1 G/DL (ref 31–36)
MCV RBC AUTO: 89.8 FL (ref 80–100)
PDW BLD-RTO: 14.2 % (ref 12.4–15.4)
PERFORMED ON: ABNORMAL
PLATELET # BLD: 206 K/UL (ref 135–450)
PMV BLD AUTO: 7.2 FL (ref 5–10.5)
POTASSIUM REFLEX MAGNESIUM: 4.3 MMOL/L (ref 3.5–5.1)
RBC # BLD: 4.25 M/UL (ref 4.2–5.9)
SODIUM BLD-SCNC: 134 MMOL/L (ref 136–145)
TRIGL SERPL-MCNC: 54 MG/DL (ref 0–150)
TROPONIN: 0.03 NG/ML
TROPONIN: <0.01 NG/ML
VLDLC SERPL CALC-MCNC: 11 MG/DL
WBC # BLD: 8.1 K/UL (ref 4–11)

## 2021-01-15 PROCEDURE — 6370000000 HC RX 637 (ALT 250 FOR IP): Performed by: INTERNAL MEDICINE

## 2021-01-15 PROCEDURE — 2060000000 HC ICU INTERMEDIATE R&B

## 2021-01-15 PROCEDURE — 97116 GAIT TRAINING THERAPY: CPT | Performed by: PHYSICAL THERAPIST

## 2021-01-15 PROCEDURE — 93306 TTE W/DOPPLER COMPLETE: CPT

## 2021-01-15 PROCEDURE — 83036 HEMOGLOBIN GLYCOSYLATED A1C: CPT

## 2021-01-15 PROCEDURE — 6370000000 HC RX 637 (ALT 250 FOR IP): Performed by: STUDENT IN AN ORGANIZED HEALTH CARE EDUCATION/TRAINING PROGRAM

## 2021-01-15 PROCEDURE — 70551 MRI BRAIN STEM W/O DYE: CPT

## 2021-01-15 PROCEDURE — 85027 COMPLETE CBC AUTOMATED: CPT

## 2021-01-15 PROCEDURE — 97530 THERAPEUTIC ACTIVITIES: CPT

## 2021-01-15 PROCEDURE — 97530 THERAPEUTIC ACTIVITIES: CPT | Performed by: PHYSICAL THERAPIST

## 2021-01-15 PROCEDURE — 6360000002 HC RX W HCPCS: Performed by: STUDENT IN AN ORGANIZED HEALTH CARE EDUCATION/TRAINING PROGRAM

## 2021-01-15 PROCEDURE — 97535 SELF CARE MNGMENT TRAINING: CPT

## 2021-01-15 PROCEDURE — 97166 OT EVAL MOD COMPLEX 45 MIN: CPT

## 2021-01-15 PROCEDURE — 36415 COLL VENOUS BLD VENIPUNCTURE: CPT

## 2021-01-15 PROCEDURE — 80061 LIPID PANEL: CPT

## 2021-01-15 PROCEDURE — 97162 PT EVAL MOD COMPLEX 30 MIN: CPT | Performed by: PHYSICAL THERAPIST

## 2021-01-15 PROCEDURE — 2580000003 HC RX 258: Performed by: STUDENT IN AN ORGANIZED HEALTH CARE EDUCATION/TRAINING PROGRAM

## 2021-01-15 PROCEDURE — 93005 ELECTROCARDIOGRAM TRACING: CPT | Performed by: STUDENT IN AN ORGANIZED HEALTH CARE EDUCATION/TRAINING PROGRAM

## 2021-01-15 PROCEDURE — 80048 BASIC METABOLIC PNL TOTAL CA: CPT

## 2021-01-15 PROCEDURE — 84484 ASSAY OF TROPONIN QUANT: CPT

## 2021-01-15 PROCEDURE — 99223 1ST HOSP IP/OBS HIGH 75: CPT | Performed by: INTERNAL MEDICINE

## 2021-01-15 RX ORDER — CLOPIDOGREL BISULFATE 75 MG/1
75 TABLET ORAL DAILY
Status: DISCONTINUED | OUTPATIENT
Start: 2021-01-16 | End: 2021-01-16 | Stop reason: HOSPADM

## 2021-01-15 RX ORDER — ATORVASTATIN CALCIUM 10 MG/1
10 TABLET, FILM COATED ORAL NIGHTLY
Status: DISCONTINUED | OUTPATIENT
Start: 2021-01-15 | End: 2021-01-16 | Stop reason: HOSPADM

## 2021-01-15 RX ORDER — ASPIRIN 300 MG/1
300 SUPPOSITORY RECTAL DAILY
Status: DISCONTINUED | OUTPATIENT
Start: 2021-01-15 | End: 2021-01-15

## 2021-01-15 RX ORDER — METOPROLOL SUCCINATE 50 MG/1
50 TABLET, EXTENDED RELEASE ORAL DAILY
Status: DISCONTINUED | OUTPATIENT
Start: 2021-01-15 | End: 2021-01-16 | Stop reason: HOSPADM

## 2021-01-15 RX ORDER — ATORVASTATIN CALCIUM 80 MG/1
80 TABLET, FILM COATED ORAL NIGHTLY
Status: DISCONTINUED | OUTPATIENT
Start: 2021-01-15 | End: 2021-01-15

## 2021-01-15 RX ORDER — SPIRONOLACTONE 25 MG/1
25 TABLET ORAL DAILY
Status: DISCONTINUED | OUTPATIENT
Start: 2021-01-15 | End: 2021-01-16 | Stop reason: HOSPADM

## 2021-01-15 RX ORDER — HYDRALAZINE HYDROCHLORIDE 20 MG/ML
5 INJECTION INTRAMUSCULAR; INTRAVENOUS EVERY 6 HOURS PRN
Status: DISCONTINUED | OUTPATIENT
Start: 2021-01-15 | End: 2021-01-16 | Stop reason: HOSPADM

## 2021-01-15 RX ORDER — INSULIN GLARGINE 100 [IU]/ML
35 INJECTION, SOLUTION SUBCUTANEOUS DAILY
Status: DISCONTINUED | OUTPATIENT
Start: 2021-01-15 | End: 2021-01-16 | Stop reason: HOSPADM

## 2021-01-15 RX ORDER — NICOTINE POLACRILEX 4 MG
15 LOZENGE BUCCAL PRN
Status: DISCONTINUED | OUTPATIENT
Start: 2021-01-15 | End: 2021-01-16 | Stop reason: HOSPADM

## 2021-01-15 RX ORDER — NIFEDIPINE 30 MG/1
30 TABLET, EXTENDED RELEASE ORAL DAILY
Status: DISCONTINUED | OUTPATIENT
Start: 2021-01-15 | End: 2021-01-16 | Stop reason: HOSPADM

## 2021-01-15 RX ORDER — DOXAZOSIN MESYLATE 4 MG/1
4 TABLET ORAL NIGHTLY
Status: DISCONTINUED | OUTPATIENT
Start: 2021-01-15 | End: 2021-01-16 | Stop reason: HOSPADM

## 2021-01-15 RX ORDER — DEXTROSE MONOHYDRATE 25 G/50ML
12.5 INJECTION, SOLUTION INTRAVENOUS PRN
Status: DISCONTINUED | OUTPATIENT
Start: 2021-01-15 | End: 2021-01-16 | Stop reason: HOSPADM

## 2021-01-15 RX ORDER — SODIUM CHLORIDE 0.9 % (FLUSH) 0.9 %
10 SYRINGE (ML) INJECTION PRN
Status: DISCONTINUED | OUTPATIENT
Start: 2021-01-15 | End: 2021-01-16 | Stop reason: HOSPADM

## 2021-01-15 RX ORDER — ASPIRIN 81 MG/1
81 TABLET ORAL DAILY
Status: DISCONTINUED | OUTPATIENT
Start: 2021-01-15 | End: 2021-01-15

## 2021-01-15 RX ORDER — AMIODARONE HYDROCHLORIDE 200 MG/1
200 TABLET ORAL 2 TIMES DAILY
Status: DISCONTINUED | OUTPATIENT
Start: 2021-01-15 | End: 2021-01-16 | Stop reason: HOSPADM

## 2021-01-15 RX ORDER — DEXTROSE MONOHYDRATE 50 MG/ML
100 INJECTION, SOLUTION INTRAVENOUS PRN
Status: DISCONTINUED | OUTPATIENT
Start: 2021-01-15 | End: 2021-01-16 | Stop reason: HOSPADM

## 2021-01-15 RX ORDER — SODIUM CHLORIDE 0.9 % (FLUSH) 0.9 %
10 SYRINGE (ML) INJECTION EVERY 12 HOURS SCHEDULED
Status: DISCONTINUED | OUTPATIENT
Start: 2021-01-15 | End: 2021-01-16 | Stop reason: HOSPADM

## 2021-01-15 RX ORDER — LISINOPRIL 20 MG/1
20 TABLET ORAL DAILY
Status: DISCONTINUED | OUTPATIENT
Start: 2021-01-15 | End: 2021-01-16 | Stop reason: HOSPADM

## 2021-01-15 RX ORDER — ONDANSETRON 2 MG/ML
4 INJECTION INTRAMUSCULAR; INTRAVENOUS EVERY 6 HOURS PRN
Status: DISCONTINUED | OUTPATIENT
Start: 2021-01-15 | End: 2021-01-16 | Stop reason: HOSPADM

## 2021-01-15 RX ADMIN — SPIRONOLACTONE 25 MG: 25 TABLET ORAL at 14:37

## 2021-01-15 RX ADMIN — AMIODARONE HYDROCHLORIDE 200 MG: 200 TABLET ORAL at 21:19

## 2021-01-15 RX ADMIN — INSULIN LISPRO 1 UNITS: 100 INJECTION, SOLUTION INTRAVENOUS; SUBCUTANEOUS at 08:49

## 2021-01-15 RX ADMIN — SODIUM CHLORIDE, PRESERVATIVE FREE 10 ML: 5 INJECTION INTRAVENOUS at 21:33

## 2021-01-15 RX ADMIN — LISINOPRIL 20 MG: 20 TABLET ORAL at 13:28

## 2021-01-15 RX ADMIN — INSULIN GLARGINE 35 UNITS: 100 INJECTION, SOLUTION SUBCUTANEOUS at 13:28

## 2021-01-15 RX ADMIN — DOXAZOSIN 4 MG: 4 TABLET ORAL at 21:19

## 2021-01-15 RX ADMIN — AMIODARONE HYDROCHLORIDE 200 MG: 200 TABLET ORAL at 14:38

## 2021-01-15 RX ADMIN — INSULIN LISPRO 4 UNITS: 100 INJECTION, SOLUTION INTRAVENOUS; SUBCUTANEOUS at 12:08

## 2021-01-15 RX ADMIN — INSULIN LISPRO 2 UNITS: 100 INJECTION, SOLUTION INTRAVENOUS; SUBCUTANEOUS at 21:19

## 2021-01-15 RX ADMIN — ASPIRIN 81 MG: 81 TABLET, COATED ORAL at 08:49

## 2021-01-15 RX ADMIN — ATORVASTATIN CALCIUM 10 MG: 10 TABLET, FILM COATED ORAL at 21:19

## 2021-01-15 RX ADMIN — NIFEDIPINE 30 MG: 30 TABLET, EXTENDED RELEASE ORAL at 14:37

## 2021-01-15 RX ADMIN — INSULIN LISPRO 3 UNITS: 100 INJECTION, SOLUTION INTRAVENOUS; SUBCUTANEOUS at 18:12

## 2021-01-15 RX ADMIN — ENOXAPARIN SODIUM 40 MG: 40 INJECTION SUBCUTANEOUS at 12:09

## 2021-01-15 RX ADMIN — METOPROLOL SUCCINATE 50 MG: 50 TABLET, EXTENDED RELEASE ORAL at 13:28

## 2021-01-15 RX ADMIN — HYDRALAZINE HYDROCHLORIDE 5 MG: 20 INJECTION, SOLUTION INTRAMUSCULAR; INTRAVENOUS at 12:09

## 2021-01-15 RX ADMIN — SODIUM CHLORIDE, PRESERVATIVE FREE 10 ML: 5 INJECTION INTRAVENOUS at 08:49

## 2021-01-15 RX ADMIN — APIXABAN 5 MG: 5 TABLET, FILM COATED ORAL at 21:19

## 2021-01-15 ASSESSMENT — PAIN SCALES - GENERAL
PAINLEVEL_OUTOF10: 0

## 2021-01-15 NOTE — PROGRESS NOTES
Speech Language Pathology    SLP eval/tx order received. Patient met at bedside with wife. ST domains reviewed with patient. Patient declines evaluation at this time declining needs at this time. ST to discontinue attempts. Please re-order/reconsult ST should status/needs change or if evaluation is deemed necessary by MD.    Thank you. Dejon Villanueva, #[id number]  Speech-Language Pathologist  Portable phone: [phone]

## 2021-01-15 NOTE — PROGRESS NOTES
Physical Therapy    Facility/Department: [address] PROGRESSIVE CARE  Initial Assessment    NAME: Treasure Velez  : 1945  MRN: [id number]    Date of Service: 1/15/2021    Discharge Recommendations:  Home with assist PRN, S Level 1   PT Equipment Recommendations  Equipment Needed: No  Treasure Velez scored a 20/24 on the AM-PAC short mobility form. Current research shows that an AM-PAC score of 18 or greater is typically associated with a discharge to the patient's home setting. Based on the patient's AM-PAC score and their current functional mobility deficits, it is recommended that the patient have 2-3 sessions per week of Physical Therapy at d/c to increase the patient's independence. At this time, this patient demonstrates the endurance and safety to discharge home with home PT and a follow up treatment frequency of 2-3x/wk. Please see assessment section for further patient specific details. If patient discharges prior to next session this note will serve as a discharge summary. Please see below for the latest assessment towards goals. Assessment   Assessment: pt is a 67 yo male who was adm to hosp with slurred speech and some confusion. Pt's symptoms have mostly resolved but he is slightly below his baseline. Pt needing SBA for transfers and gait and is feeling more confident walking with a RW vs a cane which is his usual device. Recommend home with home PT to ensure safe transition to home  Prognosis: Good  Decision Making: Low Complexity  PT Education: General Safety  Barriers to Learning: none  REQUIRES PT FOLLOW UP: Yes  Activity Tolerance  Activity Tolerance: Patient Tolerated treatment well       Patient Diagnosis(es): There were no encounter diagnoses. has a past medical history of FLOR (acute kidney injury) ([facility] [address] Asthma, CAD (coronary artery disease), Diabetes mellitus ([facility] [address] [place] (hard of hearing), HTN (hypertension), Hyperlipidemia, Melanoma ([facility] [address] Prostate CA ([facility] [address] and Syncope.   has a past surgical history that includes hernia repair (Bilateral, distant past); shoulder surgery (Bilateral, 10-12 years ago); Coronary angioplasty with stent (5/1/2016); and Cystocopy (08/22/2018). Restrictions  Restrictions/Precautions  Restrictions/Precautions: Fall Risk  Vision/Hearing  Vision: Impaired  Vision Exceptions: Wears glasses for reading  Hearing: Exceptions to [facility]  Hearing Exceptions: Hard of hearing/hearing concerns;Bilateral hearing aid     Subjective  General  Chart Reviewed: Yes  Patient assessed for rehabilitation services?: Yes  Additional Pertinent Hx: The patient is a 66 y.o. male with past medical history of FLOR, CAD with multiple stenting, type 2 diabetes, somewhat hard of hearing, hypertension, hyperlipidemia, and previous prostate cancer who [facility] from [address] [place] [address] for concern of slurred speech as well as syncopal episode and some level of confusion after being found by family at home with the symptoms and brought him to the ED. Prior to this episode, patient notes that he did not have any other recent neurologic symptoms of weakness or slurred speech or dysphagia or vision changes or focal weakness. Patient notes he has never had a stroke in the past before. Patient otherwise denies any other recent symptoms of fever, chills, lightheadedness, headaches, dizziness, syncope, chest pain, shortness of breath, blood in urine/stool/sputum, leg swelling out of the ordinary, back pain other urinary, dysuria, rashes.  MRI (-) for acute infarct  Family / Caregiver Present: Yes(wife in room)  Follows Commands: Within Functional Limits  Subjective  Subjective: pt very pleasant and agreeable to therapy  Pain Screening Patient Currently in Pain: Denies          Orientation  Orientation  Overall Orientation Status: Within Functional Limits  Social/Functional History  Social/Functional History  Lives With: Spouse  Type of Home: House  Home Layout: One level, Laundry in basement, Able to Live on Main level with bedroom/bathroom  Entrance Stairs - Number of Steps: 1 + 1 DIASY; flight to basement for laundry (spouse does laundry)  Bathroom Shower/Tub: Tub/Shower unit  Bathroom Toilet: Standard  Bathroom Equipment: Grab bars in shower, Hand-held shower, Shower chair, 3-in-1 commode  Home Equipment: Rolling walker, Cane(transport chair)  ADL Assistance: Needs assistance(spouse supervises showers and assists with LB Dressing, pt can dress UB and toilet self)  [address]: (spouse assists with IADLs)  Ambulation Assistance: Independent(with cane)  Transfer Assistance: Independent  Active : (spouse primarily drives)  Occupation: Retired  IADL Comments: sleeps in regular bed  Additional Comments: reports last fall was about 6 months ago  Cognition   Cognition  Overall Cognitive Status: WFL    Objective          AROM RLE (degrees)  RLE AROM: WFL  AROM LLE (degrees)  LLE AROM : WFL  Strength RLE  Strength RLE: WFL  Strength LLE  Strength LLE: WFL     Sensation  Overall Sensation Status: WFL(denies numbness/tingling)  Bed mobility  Supine to Sit: Stand by assistance(HOB elevated, use of bed rail)  Sit to Supine: (pt in chair at end of session)  Transfers  Sit to Stand: Stand by assistance  Stand to sit: Stand by assistance  Ambulation  Ambulation?: Yes  Ambulation 1  Surface: level tile  Device: Rolling Walker  Assistance: Stand by assistance  Quality of Gait: no LOB but slightly more unsteady than his usual self  Distance: 10' and 25' and 3'     Balance  Sitting - Static: Good  Sitting - Dynamic: Good  Standing - Static: Fair;+  Standing - Dynamic: Fair;+  Comments: standing balance with walker        Plan   Plan Times per week: 3-5  Current Treatment Recommendations: Functional Mobility Training  Safety Devices  Type of devices:  All fall risk precautions in place, Call light within reach, Left in chair, Chair alarm in place, Nurse notified    G-Code       OutComes Score                                                  AM-PAC Score  AM-PAC Inpatient Mobility Raw Score : 20 (01/15/21 1532)  AM-PAC Inpatient T-Scale Score : 47.67 (01/15/21 1532)  Mobility Inpatient CMS 0-100% Score: 35.83 (01/15/21 1532)  Mobility Inpatient CMS G-Code Modifier : Heath Oconnor (01/15/21 1532)          Goals  Short term goals  Time Frame for Short term goals: by discharge  Short term goal 1: transfers MI  Short term goal 2: amb 100' with RW or SPC  SBA  Patient Goals   Patient goals : to go home       Therapy Time   Individual Concurrent Group Co-treatment   Time In 1435         Time Out 1520         Minutes 45                 SURJIT JOHN, PT    [address] [facility] [place], [place], [address]

## 2021-01-15 NOTE — PROGRESS NOTES
4 Eyes Skin Assessment     NAME:  Jax Babin OF BIRTH:  1945  MEDICAL RECORD NUMBER:  [id number]    The patient is being assess for  Admission    I agree that 2 RN's have performed a thorough Head to Toe Skin Assessment on the patient. ALL assessment sites listed below have been assessed. Areas assessed by both nurses:    Head, Face, Ears, Shoulders, Back, Chest, Arms, Elbows, Hands, Sacrum. Buttock, Coccyx, Ischium and Legs. Feet and Heels        Does the Patient have a Wound?  No noted wound(s)       Michael Prevention initiated:  Yes   Wound Care Orders initiated:  No    Pressure Injury (Stage 3,4, Unstageable, DTI, NWPT, and Complex wounds) if present place consult order under [de-identified] No    New and Established Ostomies if present place consult order under : No      Nurse 1 eSignature: Electronically signed by Dia Calderón RN on 1/15/21 at 2:13 AM EST    **SHARE this note so that the co-signing nurse is able to place an eSignature**    Nurse 2 eSignature: Electronically signed by Carmita Jolly RN on 1/15/21 at 3:58 AM EST

## 2021-01-15 NOTE — CONSULTS
 CYSTOSCOPY  08/22/2018    cysto, [facility] TURP    HERNIA REPAIR Bilateral distant past    SHOULDER SURGERY Bilateral 10-12 years ago     Scheduled Meds:   sodium chloride flush  10 mL Intravenous 2 times per day    enoxaparin  40 mg Subcutaneous Daily    aspirin  81 mg Oral Daily    Or    aspirin  300 mg Rectal Daily    atorvastatin  80 mg Oral Nightly    insulin lispro  0-6 Units Subcutaneous TID WC    insulin lispro  0-3 Units Subcutaneous Nightly     Medications Prior to Admission:   doxazosin (CARDURA) 4 MG tablet, TAKE ONE TABLET BY MOUTH DAILY  lisinopril (PRINIVIL;ZESTRIL) 20 MG tablet, Take 1 tablet by mouth daily  rosuvastatin (CRESTOR) 10 MG tablet, TAKE ONE TABLET BY MOUTH EVERY NIGHT  clopidogrel (PLAVIX) 75 MG tablet, Take 1 tablet by mouth daily . Loading dose of 300 mg (4 tablets) on 1st day only. metoprolol succinate (TOPROL XL) 50 MG extended release tablet, TAKE ONE TABLET BY MOUTH DAILY  insulin lispro (HUMALOG) 100 UNIT/ML injection vial, Inject into the skin 3 times daily (before meals) 12 units with breakfast 14 units with lunch 16 units with dinner  Potassium 99 MG TABS, Take 1 tablet by mouth daily   loratadine (CLARITIN) 10 MG tablet, Take 10 mg by mouth daily [employer] brand  insulin glargine (LANTUS) 100 UNIT/ML injection vial, Inject 32 Units into the skin 2 times daily   nitroGLYCERIN (NITROSTAT) 0.4 MG SL tablet, Place 1 tablet under the tongue every 5 minutes as needed for Chest pain  insulin regular (HUMULIN R;NOVOLIN R) 100 UNIT/ML injection, Inject into the skin See Admin Instructions Blood Glucose less than 200 mg/dl = no coverage. -250 mg/dl = 6 units -300 mg/dl =8 units -350= 10 units -400 = 12 units    Allergies   Allergen Reactions    Doxycycline Monohydrate      Unsure of reaction    Tetracyclines & Related Other (See Comments)     shaye    Bactrim [Sulfamethoxazole-Trimethoprim] Rash     History reviewed. No pertinent family history. Social History     Tobacco Use   Smoking Status Former Smoker    Types: Cigarettes    Quit date: 1986    Years since quittin.7   Smokeless Tobacco Never Used     Social History     Substance and Sexual Activity   Drug Use No     Social History     Substance and Sexual Activity   Alcohol Use No    Alcohol/week: 0.0 standard drinks     ROS:  Constitutional- No weight loss or fevers  Eyes- No diplopia. No photophobia. Ears/nose/throat- No dysphagia. + Dysarthria  Cardiovascular- No palpitations. No chest pain  Respiratory- No dyspnea. No Cough  Gastrointestinal- No Abdominal pain. No Vomiting. Genitourinary- No incontinence. No urinary retention  Musculoskeletal- No myalgia. No arthralgia  Skin- No rash. No easy bruising. Psychiatric- No depression. No anxiety  Endocrine- No diabetes. No thyroid issues. Hematologic- No bleeding difficulty. No fatigue  Neurologic- No weakness. No Headache. Exam:  Blood pressure (!) 200/83, pulse 66, temperature 97.4 °F (36.3 °C), resp. rate 16, height 5' 9\" (1.753 m), weight 191 lb 12.8 oz (87 kg), SpO2 95 %. Constitutional    Vital signs: BP, HR, and RR reviewed   General alert, no distress, well-nourished  Eyes: unable to visualize the fundi  Cardiovascular: pulses symmetric in all 4 extremities. No peripheral edema. Psychiatric: cooperative with examination, no psychotic behavior noted. Neurologic  Mental status:   orientation to person, place, time, situation. General fund of knowledge grossly intact   Memory grossly intact   Attention intact as able to attend well to the exam     Language fluent in conversation   Comprehension intact; follows simple commands  Cranial nerves:   CN2: visual fields full  CN 3,4,6: extraocular muscles intact. Pupils are equal, round, reactive bilaterally.     CN5: facial sensation symmetric   CN7: face symmetric without dysarthria  CN8: hearing grossly intact  CN9: palate elevated symmetrically CN11: trap full strength on shoulder shrug  CN12: tongue midline with protrusion  Strength: No pronator drift. Good strength in all 4 extremities   Deep tendon reflexes: normal in all 4 extremities  Sensory: light touch intact in all 4 extremities. Cerebellar/coordination: finger nose finger normal without ataxia  Tone: normal in all 4 extremities  Gait: deferred at this time for safety. Labs  Glucose 163  Na 134  K 4.3  BUN 24  Cr 1.5    WBC 8.1K  Hg 12.6  Platelets 614    Cholesterol, Total 104   HDL Cholesterol 44   LDL Calculated 49   Triglycerides 54   VLDL Cholesterol Calculated 11     Studies  MRI brain w/o 1/15/20, independently reviewed  1. No acute intracranial abnormality.  Specifically, no acute infarction. 2. Diffuse parenchymal volume loss and sequela of mild-to-moderate chronic   microvascular ischemic changes.  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 3. Mucosal thickening of the paranasal sinuses with right maxillary sinus   air-fluid level.  Clinical correlation for acute sinusitis suggested. CTA head/neck 1/14/20 - images unavailable for review  Occluded L PCA w/ collateral flow. 70% or greater CANDACE proximal stenosis from calcified plaque. Bilateral proximal vertebral artery stenosis, 80-90% on L and 50% on R.      EKG 1/15/21  NSR    Impression  1. Suspect TIA. MRI brain was w/out any acute findings. 2.  Severe proximal CANDACE stenosis. Unsure significance. 3.  Occluded L PCA; bilateral vertebral artery stenosis. 4.  HTN/HLD/DM. 5.  Hx prostate CA. Recommendations  1. TTE w/ bubble study. 2.  I have resumed his home Plavix. Would likely benefit from 3-4 weeks of DAPT for high risk TIA. 3.  High intensity statin. 4.  BP < 140/90.      5.  HgA1c.    6.  Telemetry. 7.  Would recommend evaluation for CANDACE stenosis. Looks like Cardiology (?Dr. Arcelia Tolliver) may already be involved. 8.  Rehabilitation efforts as indicated.       Jem Hernandez NP  [address] [place] [address] [id number] Neurology    A copy of this note was provided for Dr Stephen Gomez, DO

## 2021-01-15 NOTE — PROGRESS NOTES
Pt arrived via medical transport from [address] [place] [address]  to room 4731. Heart monitor connected and verified with [facility]. VS, assessment, and admission complete. 4 eyes assessment complete. Pt oriented to unit and room. Call light and bedside table in reach. All questions answered. Pt resting quietly in bed with no complaints or voiced needs at this time. Will continue to monitor.

## 2021-01-15 NOTE — H&P
Hospital Medicine History & Physical      PCP: Ying Martinez MD    Date of Admission: 1/15/2021    Date of Service: Pt seen/examined on 1/15/2021 and Admitted to Inpatient     Chief Complaint: Syncopal episode, slurred speech, confusion and some feeling of out of body experience    History Of Present Illness: The patient is a 66 y.o. male with past medical history of FLOR, CAD with multiple stenting, type 2 diabetes, somewhat hard of hearing, hypertension, hyperlipidemia, and previous prostate cancer who presents to [facility] from [address] [place] [address] for concern of slurred speech as well as syncopal episode and some level of confusion after being found by family at home with the symptoms and brought him to the ED. Prior to this episode, patient notes that he did not have any other recent neurologic symptoms of weakness or slurred speech or dysphagia or vision changes or focal weakness. Patient notes he has never had a stroke in the past before. Patient otherwise denies any other recent symptoms of fever, chills, lightheadedness, headaches, dizziness, syncope, chest pain, shortness of breath, blood in urine/stool/sputum, leg swelling out of the ordinary, back pain other urinary, dysuria, rashes. Medication Sig Start Date End Date Taking? Authorizing Provider   doxazosin (CARDURA) 4 MG tablet TAKE ONE TABLET BY MOUTH DAILY 11/18/20  Yes Rodrick Rivera MD   lisinopril (PRINIVIL;ZESTRIL) 20 MG tablet Take 1 tablet by mouth daily 10/16/20  Yes Rodrick Rivera MD   rosuvastatin (CRESTOR) 10 MG tablet TAKE ONE TABLET BY MOUTH EVERY NIGHT 6/23/20  Yes Katia Stallworth MD   clopidogrel (PLAVIX) 75 MG tablet Take 1 tablet by mouth daily . Loading dose of 300 mg (4 tablets) on 1st day only. 4/7/20  Yes Rodrick Rivera MD   metoprolol succinate (TOPROL XL) 50 MG extended release tablet TAKE ONE TABLET BY MOUTH DAILY 1/13/20  Yes Katia Stallworth MD   insulin lispro (HUMALOG) 100 UNIT/ML injection vial Inject into the skin 3 times daily (before meals) 12 units with breakfast  14 units with lunch  16 units with dinner   Yes Historical Provider, MD   Potassium 99 MG TABS Take 1 tablet by mouth daily    Yes Historical Provider, MD   loratadine (CLARITIN) 10 MG tablet Take 10 mg by mouth daily [employer]   Yes Historical Provider, MD   insulin glargine (LANTUS) 100 UNIT/ML injection vial Inject 32 Units into the skin 2 times daily    Yes Historical Provider, MD   nitroGLYCERIN (NITROSTAT) 0.4 MG SL tablet Place 1 tablet under the tongue every 5 minutes as needed for Chest pain 10/7/16 7/29/20  Addy Alvarenga MD       Allergies:  Doxycycline monohydrate, Tetracyclines & related, and Bactrim [sulfamethoxazole-trimethoprim]    Social History:  The patient currently lives home    TOBACCO:   reports that he quit smoking about 34 years ago. His smoking use included cigarettes. He has never used smokeless tobacco.  ETOH:   reports no history of alcohol use. Family History:  Reviewed in detail and negative for DM, Early CAD, Cancer, CVA. Positive as follows:    History reviewed. No pertinent family history. REVIEW OF SYSTEMS:   as noted in the HPI. All other systems reviewed and negative.     PHYSICAL EXAM: BP (!) 200/83   Pulse 66   Temp 97.4 °F (36.3 °C)   Resp 16   Ht 5' 9\" (1.753 m)   Wt 191 lb 12.8 oz (87 kg)   SpO2 95%   BMI 28.32 kg/m²     General appearance: No apparent distress appears stated age and cooperative. HEENT Normal cephalic, atraumatic without obvious deformity. Pupils equal, round, and reactive to light. Extra ocular muscles intact. Conjunctivae/corneas clear. Neck: Supple, No jugular venous distention/bruits. Trachea midline without thyromegaly or adenopathy with full range of motion. Lungs: Clear to auscultation, bilaterally without Rales/Wheezes/Rhonchi with good respiratory effort. Heart: Regular rate and rhythm with Normal S1/S2 without murmurs, rubs or gallops, point of maximum impulse non-displaced  Abdomen: Soft, non-tender or non-distended without rigidity or guarding and positive bowel sounds all four quadrants. Extremities: No clubbing, cyanosis, or edema bilaterally. Full range of motion without deformity and normal gait intact. Skin: Skin color, texture, turgor normal.  No rashes or lesions. Neurologic: Alert and oriented X 3, neurovascularly intact with sensory/motor intact upper extremities/lower extremities, bilaterally. Cranial nerves: II-XII intact, grossly non-focal.  Mental status: Alert, oriented, thought content appropriate. Capillary Refill: Acceptable  < 3 seconds  Peripheral Pulses: +3 Easily felt, not easily obliterated with pressure      As noted in the HPI from [facility], repeating labs this morning  Troponin noted to be 0.03 here in this facility    CBC   Recent Labs     01/15/21  0341   WBC 8.1   HGB 12.6*   HCT 38.2*         RENAL  Recent Labs     01/15/21  0341   *   K 4.3      CO2 22   BUN 24*   CREATININE 1.5*     LFT'S  No results for input(s): AST, ALT, ALB, BILIDIR, BILITOT, ALKPHOS in the last 72 hours. COAG  No results for input(s): INR in the last 72 hours.   CARDIAC ENZYMES  Recent Labs     01/15/21 0341   TROPONINI 0.03*       U/A:  No results found for: NITRITE, COLORU, WBCUA, RBCUA, MUCUS, BACTERIA, CLARITYU, SPECGRAV, LEUKOCYTESUR, BLOODU, GLUCOSEU, AMORPHOUS    ABG  No results found for: TXF7HUS, BEART, L3ZHBFIR, PHART, THGBART, UKE2VIS, PO2ART, CIK7HFW        Active Hospital Problems    Diagnosis Date Noted    TIA (transient ischemic attack) [G45.9] 01/14/2021     Priority: High    FLOR (acute kidney injury) ([facility] [N17.9] 01/15/2021     Priority: Medium    Syncope [R55] 01/15/2021     Priority: Medium    Elevated troponin [R77.8] 01/15/2021     Priority: Medium    Pure hypercholesterolemia [E78.00] 03/07/2017    Coronary artery disease involving native coronary artery of native heart without angina pectoris [I25.10] 10/07/2016    Essential hypertension [I10]     Type [facility] diabetes mellitus [facility] complication [facility] [M47.7]          PHYSICIANS CERTIFICATION:    I certify that Shaun Booker is expected to be hospitalized for greater than 2 midnights based on the following assessment and plan:      ASSESSMENT/PLAN:  · Must monitor patient's renal function given that he was given IV contrast for the CT scans at the other facility  · Initial troponin here in the facility was 0.03, repeating troponins, uncertain if this may be related to patient's neurologic manifestation or if there is a true ACS process presenting itself as well  · Repeat EKG in this facility demonstrated very similar findings per the other EKG report but not able to compare the imaging at this time  · Neurology consulted for TIA and syncope  · Cardiology consulted for noted syncopal episode as well as possibility of elevated troponin secondary due to true ACS process  · Obtaining MRI of the brain noncontrast in the morning  · Obtaining transthoracic echocardiogram in the morning    DVT Prophylaxis: Lovenox  Diet: DIET LOW SODIUM 2 GM; Carb Control: 4 carb choices (60 gms)/meal  Code Status: Full Code PT/OT Eval Status: Ambulatory with cane    Dispo -pending clinical course       Ugo Greco DO    Thank you Paul Anaya MD for the opportunity to be involved in this patient's care. If you have any questions or concerns please feel free to contact me at [phone].

## 2021-01-15 NOTE — PROGRESS NOTES
NAME:  Alireza Villatoro  YOB: 1945  MEDICAL RECORD NUMBER:  [id number]  TODAYS DATE:  1/15/2021    Discussed personal risk factors for Stroke /TIA with patient/family, and ways to reduce the risk for a recurrent stroke. Patient's personal risk factors which were identified are:     [] Alcohol Abuse: check with your physician before any alcohol consumption. [] Atrial fibrillation: may cause blood clots. [] Drug Abuse: Seek help, talk with your doctor  [] Clotting Disorder  [x] Diabetes  [] Family history of stroke or heart disease  [x] High Blood Pressure/Hypertension: work with your physician. [x] High cholesterol: monitor cholesterol levels with your physician. [x] Overweight/Obesity: work with your physician for your ideal body weight. [x] Physical Inactivity: get regular exercise as directed by your physician. [x] Personal history of previous TIA or stroke  [] Poor Diet; decrease salt (sodium) in your diet, follow diet directed by physician. [] Smoking: Cigarette/Cigar: stop smoking. Advised pt. that you can reduce your risk for stroke/TIA by modifying/controlling the risk factors that you have. Pt.advised to take the medications as prescribed, which will be detailed in the discharge instructions, and to not stop taking them without consulting their physician. In addition, pt. advised to maintain a healthy diet, exercise regularly and to not smoke. [facility]'s Stroke treatment and prevention, Managing your recovery  notebook  provided and/or reviewed  with patient/family. The notebook includes, but not limited to, sections addressing warning signs & symptoms of a stroke, which are: sudden numbness or weakness especially on one side of the body, sudden confusion, difficulty speaking or understanding, sudden changes in vision, sudden dizziness or loss of balance/ coordination, or sudden severe headache. The need to call EMS ([phone]) immediately if signs & symptoms occur is emphasized . The notebook also provides education on Stroke community resources and stroke advocacy. The need for follow-up after discharge was highlighted with patient/family with them being able to repeat understanding of the importance of this.       Electronically signed by Charles Gonzalez RN on 1/15/2021 at 4:13 AM

## 2021-01-15 NOTE — PROGRESS NOTES
Occupational Therapy   Occupational Therapy Initial Assessment  Date: 1/15/2021   Patient Name: Young Cason  MRN: [id number]     : 1945    Date of Service: 1/15/2021    Discharge Recommendations:  Home with assist PRN  OT Equipment Recommendations  Equipment Needed: No    Assessment   Performance deficits / Impairments: Decreased strength;Decreased functional mobility ; Decreased endurance;Decreased high-level IADLs;Decreased balance;Decreased safe awareness  Assessment: 65 y/o male presented to [facility] 1/15/2021 from [facility] for syncopal episode, slurred speech, and confusion. CT head negative for findings. MRI showed no acute intracranial abnormality. PTA pt lives at home with spouse. Spouse assists with showers and LB dressing, pt can dress UB and toilet self and uses cane for functional mobility. Today, pt completed functional mobility around room with RW and CGA/SBA. Pt required CGA for balance during standing components of ADLs. Pt with functional UE ROM for self care. Anticipate pt will be safe to return home with family support at discharge. Prognosis: Good  Decision Making: Medium Complexity  OT Education: OT Role;Plan of Care;Transfer Training  REQUIRES OT FOLLOW UP: Yes  Activity Tolerance  Activity Tolerance: Patient Tolerated treatment well  Safety Devices  Safety Devices in place: Yes  Type of devices: Left in chair;Nurse notified;Gait belt;Call light within reach; Chair alarm in place           Patient Diagnosis(es): There were no encounter diagnoses. has a past medical history of FLOR (acute kidney injury) ([facility] Asthma, CAD (coronary artery disease), Diabetes mellitus ([facility] [place] (hard of hearing), HTN (hypertension), Hyperlipidemia, Melanoma ([facility] Prostate CA ([facility] and Syncope. has a past surgical history that includes hernia repair (Bilateral, distant past); shoulder surgery (Bilateral, 10-12 years ago); Coronary angioplasty with stent (5/1/2016); and Cystocopy (08/22/2018). Restrictions  Restrictions/Precautions  Restrictions/Precautions: Fall Risk    Subjective   General  Chart Reviewed: Yes  Patient assessed for rehabilitation services?: Yes  Additional Pertinent Hx: 65 y/o male presented to [facility] 1/15/2021 from [facility] for syncopal episode, slurred speech, and confusion. CT head negative for findings. MRI showed no acute intracranial abnormality. Specifically, no acute infarction. Family / Caregiver Present: Yes(spouse)  Referring Practitioner: Mary Araujo DO  Subjective  Subjective: Pt seen bedside and agreeable to therapy. General Comment  Comments: Per RN ok for therapy.   Vital Signs  Pulse: 66  BP: (!) 165/71  MAP (mmHg): 102     Social/Functional History  Social/Functional History  Lives With: Spouse  Type of Home: House  Home Layout: One level, Laundry in basement, Able to Live on Main level with bedroom/bathroom  Entrance Stairs - Number of Steps: 1 + 1 DAISY; flight to basement for laundry (spouse does laundry)  Bathroom Shower/Tub: Tub/Shower unit  Bathroom Toilet: Standard  Bathroom Equipment: Grab bars in shower, Hand-held shower, Shower chair, 3-in-1 commode  Home Equipment: Rolling walker, Cane(transport chair)  ADL Assistance: Needs assistance(spouse supervises showers and assists with LB Dressing, pt can dress UB and toilet self)  Homemaking Assistance: (spouse assists with IADLs)  Ambulation Assistance: Independent(with cane)  Transfer Assistance: Independent  Active : (spouse primarily drives)  Occupation: Retired  IADL Comments: sleeps in regular bed  Additional Comments: reports last fall was about 6 months ago       Objective   Vision: Impaired  Vision Exceptions: Wears glasses for reading  Hearing: Exceptions to [facility] Hearing Exceptions: Hard of hearing/hearing concerns;Bilateral hearing aid    Orientation  Overall Orientation Status: Within Functional Limits     Balance  Sitting Balance: Stand by assistance(EOB in prep for transfers)  Standing Balance: Contact guard assistance  Standing Balance  Time: stood prn for standing components of ADLs  Functional Mobility  Functional Mobility Comments: around room/bathroom with RW and CGA/SBA    Toilet Transfers  Toilet - Technique: Ambulating  Equipment Used: Standard toilet  Toilet Transfer: Contact guard assistance  Toilet Transfers Comments: grab bar support.  Stood 3x from toilet    ADL  Grooming: Contact guard assistance(standing at sink to wash hands)  Toileting: Contact guard assistance(CGA to stand and void in front of toilet; pt able to manage clothes up/down)  Additional Comments: Anticipate pt will require SBA for UB bathing/dressing based on balance and ROM observed        Bed mobility  Supine to Sit: Stand by assistance(HOB elevated, use of bed rail)  Sit to Supine: (pt in chair at end of session)  Transfers  Sit to stand: Contact guard assistance  Stand to sit: Contact guard assistance  Transfer Comments: to/from RW     Cognition  Overall Cognitive Status: WFL  Perception  Overall Perceptual Status: WFL     Sensation  Overall Sensation Status: WFL(denies numbness/tingling)        LUE AROM (degrees)  LUE AROM : WFL  LUE General AROM: hx shoulder sx but functional  Left Hand AROM (degrees)  Left Hand AROM: WFL  RUE AROM (degrees)  RUE AROM : WFL  RUE General AROM: hx shoulder sx but functional  Right Hand AROM (degrees)  Right Hand AROM: WFL        Plan   Plan  Times per week: 3-5  Current Treatment Recommendations: Strengthening, Functional Mobility Training, Gait Training, Balance Training, Endurance Training, Self-Care / ADL    AM-PAC Score  AM-[facility] Inpatient Daily Activity Raw Score: 20 (01/15/21 1528)  AM-PAC Inpatient ADL T-Scale Score : 42.03 (01/15/21 1528) ADL Inpatient CMS 0-100% Score: 38.32 (01/15/21 1528)  ADL Inpatient CMS G-Code Modifier : CJ (01/15/21 1528)    Goals  Short term goals  Time Frame for Short term goals: Prior to DC: Short term goal 1: Pt will complete ADL transfers/mobility with supervision  Short term goal 2: Pt will tolerate standing > 5 min for functional tasks with supervision  Short term goal 3: Pt will complete toileting with supervision  Short term goal 4: Pt will complete bed mobility from flat bed in prep for ADL transfers with supervision  Patient Goals   Patient goals : to return home       Therapy Time   Individual Concurrent Group Co-treatment   Time In 1435         Time Out 1520         Minutes 45         Timed Code Treatment Minutes: 30 Minutes     This note to serve as OT d/c summary if pt is d/c-ed prior to next therapy session.     Elizabeth Haywood, OTR/L

## 2021-01-15 NOTE — PROGRESS NOTES
NAME:  Ar Patton  YOB: 1945  MEDICAL RECORD NUMBER:  [id number]  TODAYS DATE:  1/15/2021    Discussed personal risk factors for Stroke /TIA with patient/family, and ways to reduce the risk for a recurrent stroke. Patient's personal risk factors which were identified are:     [] Alcohol Abuse: check with your physician before any alcohol consumption. [] Atrial fibrillation: may cause blood clots. [] Drug Abuse: Seek help, talk with your doctor  [] Clotting Disorder  [x] Diabetes  [] Family history of stroke or heart disease  [x] High Blood Pressure/Hypertension: work with your physician. [x] High cholesterol: monitor cholesterol levels with your physician. [x] Overweight/Obesity: work with your physician for your ideal body weight. [x] Physical Inactivity: get regular exercise as directed by your physician. [x] Personal history of previous TIA or stroke  [] Poor Diet; decrease salt (sodium) in your diet, follow diet directed by physician. [] Smoking: Cigarette/Cigar: stop smoking. Advised pt. that you can reduce your risk for stroke/TIA by modifying/controlling the risk factors that you have. Pt.advised to take the medications as prescribed, which will be detailed in the discharge instructions, and to not stop taking them without consulting their physician. In addition, pt. advised to maintain a healthy diet, exercise regularly and to not smoke. [facility]'s Stroke treatment and prevention, Managing your recovery  notebook  provided and/or reviewed  with patient/family. The notebook includes, but not limited to, sections addressing warning signs & symptoms of a stroke, which are: sudden numbness or weakness especially on one side of the body, sudden confusion, difficulty speaking or understanding, sudden changes in vision, sudden dizziness or loss of balance/ coordination, or sudden severe headache. The need to call EMS (911) immediately if signs & symptoms occur is emphasized . The notebook also provides education on Stroke community resources and stroke advocacy. The need for follow-up after discharge was highlighted with patient/family with them being able to repeat understanding of the importance of this.       Electronically signed by Marvin Santos RN on 1/15/2021 at 11:00 AM

## 2021-01-15 NOTE — CONSULTS
[facility]  Cardiology Consult Note        CC:     Syncope/troponin elevation           HPI:   This is a 66 y.o. male admitted with TIA. I got the history from his wife since the patient was not in the room. She states that she had just returned back from a doctor's appointment. She found him sitting in a chair playing with the telephone. When she started talking to him he felt there was something was wrong as he was not responding very well. She took the phone away from him and looked at him. He was stable but not talk he would mumble words. She called her grandkids as well as 911 who came 45 minutes late. In the meantime the patient blood pressure was taken as well as heart rate and blood sugar. The patient's wife stated that his blood pressure was high in the 170s heart rate was not slow and the sugar was 60. The family was able to help him to a wheelchair and into the car and they brought him to the hospital ER. In the ER he became completely normal within an hour. Throughout this episode the patient never slumped over he was sitting in a chair upright. His wife does not see him passed out . However the patient after he started talking so that he may have passed out. I was finally able to talk to the patient's when he returned from MRI. He has no chest pain in his daily life. He thinks he may have passed out but there was nobody to witness it and he was sitting in a chair    Approximately a year ago ahead and PCI involving his circumflex and PDA. There is no history of chest pain      Past Medical History:   Diagnosis Date    FLOR (acute kidney injury) ([address] 1/15/2021    Asthma     as a child-not current    CAD (coronary artery disease)     Diabetes mellitus ([address]     [place] (hard of hearing)     wears bilat hearing aids    HTN (hypertension)     Hyperlipidemia     Melanoma ([address]     back    Prostate CA ([address]     Syncope 1/15/2021      Past Surgical History: Procedure Laterality Date    CORONARY ANGIOPLASTY WITH STENT PLACEMENT  2016    DENA to mid circ    CYSTOSCOPY  2018    cysto, [facility]    HERNIA REPAIR Bilateral distant past    SHOULDER SURGERY Bilateral 10-12 years ago      History reviewed. No pertinent family history.    Social History     Tobacco Use    Smoking status: Former Smoker     Types: Cigarettes     Quit date: 1986     Years since quittin.7    Smokeless tobacco: Never Used   Substance Use Topics    Alcohol use: No     Alcohol/week: 0.0 standard drinks    Drug use: No     Allergies   Allergen Reactions    Doxycycline Monohydrate      Unsure of reaction    Tetracyclines & Related Other (See Comments)     welts    Bactrim [Sulfamethoxazole-Trimethoprim] Rash      sodium chloride flush  10 mL Intravenous 2 times per day    enoxaparin  40 mg Subcutaneous Daily    atorvastatin  80 mg Oral Nightly    insulin lispro  0-6 Units Subcutaneous TID WC    insulin lispro  0-3 Units Subcutaneous Nightly    insulin glargine  35 Units Subcutaneous Daily    doxazosin  4 mg Oral Nightly    lisinopril  20 mg Oral Daily    metoprolol succinate  50 mg Oral Daily    [START ON 2021] clopidogrel  75 mg Oral Daily       Review of Systems -   Constitutional: Negative for weight gain/loss; malaise, fever  Respiratory: Negative for Asthma;  cough and hemoptysis  Cardiovascular: Negative for palpitations,dizziness   Gastrointestinal: Negative for abd.pain; constipation/diarrhea;    Genitourinary: Negative for stones; hematuria; frequency hesitancy  Integumentt: Negative for rash or pruritis  Hematologic/lymphatic: Negative for blood dyscrasia; leukemia/lymphoma  Musculoskeletal: Negative for Connective tissue disease  Neurological:  Negative for Seizure   Behavioral/Psych:Negative for Bipolar disorder, Schizophrenia; Dementia  Endocrine: negative for thyroid, parathyroid disease No intake or output data in the 24 hours ending 01/15/21 1340    Physical Examination:    BP (!) 205/89   Pulse 66   Temp 98 °F (36.7 °C) (Oral)   Resp 16   Ht 5' 9\" (1.753 m)   Wt 191 lb 12.8 oz (87 kg)   SpO2 95%   BMI 28.32 kg/m²    HEENT:  Face: Atraumatic, Conjunctiva: Pink; non icteric,  Mucous Memb:  Moist, No thyromegaly or Lymphadenopathy  Respiratory:  Resp Assessment: normal, Resp Auscultation: clear   Cardiovascular: Auscultation: nl S1 & S2, Palpation:  Nl PMI;  No heaves or thrills, JVP:  normal  Abdomen: Soft, non-tender, Normal bowel sounds,  No organomegaly  Extremities: No Cyanosis or Clubbing; Edema none  Neurological: Oriented to time, place, and person, Non-anxious  Psychiatric: Normal mood and affect  Skin: Warm and dry,  No rash seen      Current Facility-Administered Medications: sodium chloride flush 0.9 % injection 10 mL, 10 mL, Intravenous, 2 times per day  sodium chloride flush 0.9 % injection 10 mL, 10 mL, Intravenous, PRN  enoxaparin (LOVENOX) injection 40 mg, 40 mg, Subcutaneous, Daily  ondansetron (ZOFRAN) injection 4 mg, 4 mg, Intravenous, Q6H PRN  atorvastatin (LIPITOR) tablet 80 mg, 80 mg, Oral, Nightly  glucose (GLUTOSE) 40 % oral gel 15 g, 15 g, Oral, PRN  dextrose 50 % IV solution, 12.5 g, Intravenous, PRN  glucagon (rDNA) injection 1 mg, 1 mg, Intramuscular, PRN  dextrose 5 % solution, 100 mL/hr, Intravenous, PRN  hydrALAZINE (APRESOLINE) injection 5 mg, 5 mg, Intravenous, Q6H PRN  perflutren lipid microspheres (DEFINITY) injection 1.65 mg, 1.5 mL, Intravenous, ONCE PRN  insulin lispro (HUMALOG) injection vial 0-6 Units, 0-6 Units, Subcutaneous, TID WC  insulin lispro (HUMALOG) injection vial 0-3 Units, 0-3 Units, Subcutaneous, Nightly  insulin glargine (LANTUS) injection vial 35 Units, 35 Units, Subcutaneous, Daily  doxazosin (CARDURA) tablet 4 mg, 4 mg, Oral, Nightly  lisinopril (PRINIVIL;ZESTRIL) tablet 20 mg, 20 mg, Oral, Daily metoprolol succinate (TOPROL XL) extended release tablet 50 mg, 50 mg, Oral, Daily  [START ON 1/16/2021] clopidogrel (PLAVIX) tablet 75 mg, 75 mg, Oral, Daily      Labs:   Recent Labs     01/15/21  0341   WBC 8.1   HGB 12.6*   HCT 38.2*        Recent Labs     01/15/21  0341   *   K 4.3   CO2 22   BUN 24*   CREATININE 1.5*   GLUCOSE 127*     No results for input(s): BNP in the last 72 hours. No results for input(s): PROTIME, INR in the last 72 hours. No results for input(s): APTT in the last 72 hours. Recent Labs     01/15/21  0341   TROPONINI 0.03*     Lab Results   Component Value Date    HDL 44 01/15/2021    LDLCALC 49 01/15/2021    TRIG 54 01/15/2021     No results for input(s): AST, ALT, LABALBU in the last 72 hours. EKG:   Sinus rhythm with nonspecific T wave changes        ECHO: 12/2020  EF 45% with no regional wall motion normalities and grade 1 diastolic dysfunction  Mild MR      Corornary angiogram  & Intervention: 12/26/2019  Two-vessel stenting;  the mid RCA & the mid LAD FFR of 0.75   LAD: 2.25 x 18 mm long jair DENA stent   Mid PDA: 2.25 x 12 mm long jair DENA stent   Lower diagonal branch was too small for stenting; will be treated  medically       ASSESSMENT AND PLAN:      Troponin elevated  Patient with a history of coronary disease status post stents to the PDA and circumflex about a year ago admitted with TIA-like symptoms  There is no chest pain either on the day of admission or in the recent past  Troponin is borderline elevated 0.03. Subsequent one is down 2.01  EKG shows nonspecific T wave inversions in the lateral leads. Clinically I do not think the patient has acute coronary syndrome  Clearly he did not have angina or chest pain  I do not recommend any further work-up just based on borderline troponin elevation;  Continue with the Plavix      TIA  His telemetry monitor shows short run of what could be A.  Fib  I see this on the telemetry monitor 1/15/2021 at 05: 47: 22 I would recommend using Eliquis 5 mg twice daily   Amiodarone to keep in SR.  Reduce dose of atorvastatin because of interaction  If he is sent home, I have instructed his wife to follow-up with me in the [place] office      Severe hypertension  Add spironolactone and nifedipine 30 mg    Young Garay M.D  1/15/2021

## 2021-01-16 VITALS
BODY MASS INDEX: 28.24 KG/M2 | HEIGHT: 69 IN | SYSTOLIC BLOOD PRESSURE: 122 MMHG | TEMPERATURE: 98.2 F | OXYGEN SATURATION: 96 % | DIASTOLIC BLOOD PRESSURE: 77 MMHG | RESPIRATION RATE: 18 BRPM | HEART RATE: 65 BPM | WEIGHT: 190.7 LBS

## 2021-01-16 LAB
A/G RATIO: 1.3 (ref 1.1–2.2)
ALBUMIN SERPL-MCNC: 3.5 G/DL (ref 3.4–5)
ALP BLD-CCNC: 85 U/L (ref 40–129)
ALT SERPL-CCNC: 14 U/L (ref 10–40)
ANION GAP SERPL CALCULATED.3IONS-SCNC: 11 MMOL/L (ref 3–16)
AST SERPL-CCNC: 13 U/L (ref 15–37)
BASOPHILS ABSOLUTE: 0 K/UL (ref 0–0.2)
BASOPHILS RELATIVE PERCENT: 0.8 %
BILIRUB SERPL-MCNC: 0.4 MG/DL (ref 0–1)
BUN BLDV-MCNC: 28 MG/DL (ref 7–20)
CALCIUM SERPL-MCNC: 9.8 MG/DL (ref 8.3–10.6)
CHLORIDE BLD-SCNC: 104 MMOL/L (ref 99–110)
CO2: 23 MMOL/L (ref 21–32)
CREAT SERPL-MCNC: 1.5 MG/DL (ref 0.8–1.3)
EOSINOPHILS ABSOLUTE: 0.3 K/UL (ref 0–0.6)
EOSINOPHILS RELATIVE PERCENT: 4.7 %
GFR AFRICAN AMERICAN: 55
GFR NON-AFRICAN AMERICAN: 45
GLOBULIN: 2.6 G/DL
GLUCOSE BLD-MCNC: 175 MG/DL (ref 70–99)
GLUCOSE BLD-MCNC: 184 MG/DL (ref 70–99)
GLUCOSE BLD-MCNC: 255 MG/DL (ref 70–99)
GLUCOSE BLD-MCNC: 270 MG/DL (ref 70–99)
HCT VFR BLD CALC: 36.5 % (ref 40.5–52.5)
HEMOGLOBIN: 12.3 G/DL (ref 13.5–17.5)
LYMPHOCYTES ABSOLUTE: 1.8 K/UL (ref 1–5.1)
LYMPHOCYTES RELATIVE PERCENT: 30.9 %
MCH RBC QN AUTO: 30.1 PG (ref 26–34)
MCHC RBC AUTO-ENTMCNC: 33.5 G/DL (ref 31–36)
MCV RBC AUTO: 89.6 FL (ref 80–100)
MONOCYTES ABSOLUTE: 0.5 K/UL (ref 0–1.3)
MONOCYTES RELATIVE PERCENT: 8.9 %
NEUTROPHILS ABSOLUTE: 3.2 K/UL (ref 1.7–7.7)
NEUTROPHILS RELATIVE PERCENT: 54.7 %
PDW BLD-RTO: 13.6 % (ref 12.4–15.4)
PERFORMED ON: ABNORMAL
PLATELET # BLD: 202 K/UL (ref 135–450)
PMV BLD AUTO: 7 FL (ref 5–10.5)
POTASSIUM SERPL-SCNC: 4.1 MMOL/L (ref 3.5–5.1)
RBC # BLD: 4.08 M/UL (ref 4.2–5.9)
SODIUM BLD-SCNC: 138 MMOL/L (ref 136–145)
TOTAL PROTEIN: 6.1 G/DL (ref 6.4–8.2)
WBC # BLD: 5.9 K/UL (ref 4–11)

## 2021-01-16 PROCEDURE — 36415 COLL VENOUS BLD VENIPUNCTURE: CPT

## 2021-01-16 PROCEDURE — 97530 THERAPEUTIC ACTIVITIES: CPT

## 2021-01-16 PROCEDURE — 6370000000 HC RX 637 (ALT 250 FOR IP): Performed by: INTERNAL MEDICINE

## 2021-01-16 PROCEDURE — 6370000000 HC RX 637 (ALT 250 FOR IP): Performed by: STUDENT IN AN ORGANIZED HEALTH CARE EDUCATION/TRAINING PROGRAM

## 2021-01-16 PROCEDURE — 2580000003 HC RX 258: Performed by: STUDENT IN AN ORGANIZED HEALTH CARE EDUCATION/TRAINING PROGRAM

## 2021-01-16 PROCEDURE — 94760 N-INVAS EAR/PLS OXIMETRY 1: CPT

## 2021-01-16 PROCEDURE — 6370000000 HC RX 637 (ALT 250 FOR IP): Performed by: NURSE PRACTITIONER

## 2021-01-16 PROCEDURE — 80053 COMPREHEN METABOLIC PANEL: CPT

## 2021-01-16 PROCEDURE — 85025 COMPLETE CBC W/AUTO DIFF WBC: CPT

## 2021-01-16 PROCEDURE — 99221 1ST HOSP IP/OBS SF/LOW 40: CPT | Performed by: STUDENT IN AN ORGANIZED HEALTH CARE EDUCATION/TRAINING PROGRAM

## 2021-01-16 RX ORDER — ATORVASTATIN CALCIUM 10 MG/1
10 TABLET, FILM COATED ORAL NIGHTLY
Qty: 30 TABLET | Refills: 0 | Status: SHIPPED | OUTPATIENT
Start: 2021-01-16 | End: 2021-01-28 | Stop reason: ALTCHOICE

## 2021-01-16 RX ORDER — NIFEDIPINE 30 MG/1
30 TABLET, FILM COATED, EXTENDED RELEASE ORAL DAILY
Qty: 30 TABLET | Refills: 0 | Status: SHIPPED | OUTPATIENT
Start: 2021-01-17 | End: 2021-01-28

## 2021-01-16 RX ORDER — AMIODARONE HYDROCHLORIDE 200 MG/1
200 TABLET ORAL 2 TIMES DAILY
Qty: 60 TABLET | Refills: 0 | Status: SHIPPED | OUTPATIENT
Start: 2021-01-16 | End: 2021-01-28

## 2021-01-16 RX ORDER — SPIRONOLACTONE 25 MG/1
25 TABLET ORAL DAILY
Qty: 30 TABLET | Refills: 0 | Status: SHIPPED | OUTPATIENT
Start: 2021-01-17 | End: 2021-01-28

## 2021-01-16 RX ADMIN — INSULIN LISPRO 1 UNITS: 100 INJECTION, SOLUTION INTRAVENOUS; SUBCUTANEOUS at 09:32

## 2021-01-16 RX ADMIN — APIXABAN 5 MG: 5 TABLET, FILM COATED ORAL at 09:31

## 2021-01-16 RX ADMIN — INSULIN LISPRO 3 UNITS: 100 INJECTION, SOLUTION INTRAVENOUS; SUBCUTANEOUS at 14:03

## 2021-01-16 RX ADMIN — ATORVASTATIN CALCIUM 10 MG: 10 TABLET, FILM COATED ORAL at 18:12

## 2021-01-16 RX ADMIN — CLOPIDOGREL BISULFATE 75 MG: 75 TABLET ORAL at 09:31

## 2021-01-16 RX ADMIN — NIFEDIPINE 30 MG: 30 TABLET, EXTENDED RELEASE ORAL at 09:32

## 2021-01-16 RX ADMIN — LISINOPRIL 20 MG: 20 TABLET ORAL at 14:02

## 2021-01-16 RX ADMIN — INSULIN LISPRO 3 UNITS: 100 INJECTION, SOLUTION INTRAVENOUS; SUBCUTANEOUS at 17:07

## 2021-01-16 RX ADMIN — INSULIN GLARGINE 35 UNITS: 100 INJECTION, SOLUTION SUBCUTANEOUS at 09:32

## 2021-01-16 RX ADMIN — SPIRONOLACTONE 25 MG: 25 TABLET ORAL at 14:02

## 2021-01-16 RX ADMIN — APIXABAN 5 MG: 5 TABLET, FILM COATED ORAL at 18:12

## 2021-01-16 RX ADMIN — AMIODARONE HYDROCHLORIDE 200 MG: 200 TABLET ORAL at 18:12

## 2021-01-16 RX ADMIN — METOPROLOL SUCCINATE 50 MG: 50 TABLET, EXTENDED RELEASE ORAL at 09:31

## 2021-01-16 RX ADMIN — AMIODARONE HYDROCHLORIDE 200 MG: 200 TABLET ORAL at 09:31

## 2021-01-16 RX ADMIN — SODIUM CHLORIDE, PRESERVATIVE FREE 10 ML: 5 INJECTION INTRAVENOUS at 15:02

## 2021-01-16 ASSESSMENT — ENCOUNTER SYMPTOMS
ABDOMINAL PAIN: 0
PHOTOPHOBIA: 0
SHORTNESS OF BREATH: 0
CHEST TIGHTNESS: 0

## 2021-01-16 ASSESSMENT — PAIN SCALES - GENERAL
PAINLEVEL_OUTOF10: 0
PAINLEVEL_OUTOF10: 0

## 2021-01-16 NOTE — CONSULTS
[facility]  Consultation Note    Chief Complaint / Reason for Consultation  Carotid stenosis    History of Present Illness  Patient is a 66 y.o. male presenting with possible TIA. He had an episode where he was briefly unable to speak. Denies any dora motor or sensory loss. Denies history of stroke or amaurosis. Denies dizziness. He denies any focal deficits of any kind. Presented with elevated troponin. Placed on Eliquis. Underwent MRI without evidence of stroke. Underwent CTA at an outside facility and transferred here for Neurologic evaluation. Denies any knowledge of carotid stenosis. Denies chest pain or shortness of breath. Review of Systems  Review of Systems   Constitutional: Negative for activity change, chills and fever. HENT: Negative for congestion. Eyes: Negative for photophobia and visual disturbance. Respiratory: Negative for chest tightness and shortness of breath. Cardiovascular: Negative for chest pain. Gastrointestinal: Negative for abdominal pain. Genitourinary: Negative for difficulty urinating. Musculoskeletal: Positive for neck pain. Skin: Negative for wound. Neurological: Positive for speech difficulty. Negative for weakness. Hematological: Does not bruise/bleed easily. Psychiatric/Behavioral: Negative for behavioral problems.         Past Medical History:   Diagnosis Date    FLOR (acute kidney injury) ([address] 1/15/2021    Asthma     as a child-not current    CAD (coronary artery disease)     Diabetes mellitus ([address]     [place] (hard of hearing)     wears bilat hearing aids    HTN (hypertension)     Hyperlipidemia     Melanoma ([address]     back    [facility]     Syncope 1/15/2021       Past Surgical History:   Procedure Laterality Date    CORONARY ANGIOPLASTY WITH STENT PLACEMENT  5/1/2016    DENA to mid circ    CYSTOSCOPY  08/22/2018    cysto, [facility]    HERNIA REPAIR Bilateral distant past  SHOULDER SURGERY Bilateral 10-12 years ago       Allergies   Allergen Reactions    Doxycycline Monohydrate      Unsure of reaction    Tetracyclines & Related Other (See Comments)     welts    Bactrim [Sulfamethoxazole-Trimethoprim] Rash       Social History     Socioeconomic History    Marital status:      Spouse name: Not on file    Number of children: Not on file    Years of education: Not on file    Highest education level: Not on file   Occupational History    Not on file   Social Needs    Financial resource strain: Not on file    Food insecurity     Worry: Not on file     Inability: Not on file    Transportation needs     Medical: Not on file     Non-medical: Not on file   Tobacco Use    Smoking status: Former Smoker     Types: Cigarettes     Quit date: 1986     Years since quittin.7    Smokeless tobacco: Never Used   Substance and Sexual Activity    Alcohol use: No     Alcohol/week: 0.0 standard drinks    Drug use: No    Sexual activity: Yes     Partners: Female   Lifestyle    Physical activity     Days per week: Not on file     Minutes per session: Not on file    Stress: Not on file   Relationships    Social connections     Talks on phone: Not on file     Gets together: Not on file     Attends Congregation service: Not on file     Active member of club or organization: Not on file     Attends meetings of clubs or organizations: Not on file     Relationship status: Not on file    Intimate partner violence     Fear of current or ex partner: Not on file     Emotionally abused: Not on file     Physically abused: Not on file     Forced sexual activity: Not on file   Other Topics Concern    Not on file   Social History Narrative    Not on file       History reviewed.  No pertinent family history.  - No history of bleeding or clotting disorders    Vital Signs  Vitals:    21 0437 21 0943 21 1153 21 1501   BP:  109/64 115/66 122/77   Pulse:  62 58 65 Resp:  18 18 18   Temp:  97.3 °F (36.3 °C) 97.5 °F (36.4 °C) 98.2 °F (36.8 °C)   TempSrc:  Oral Oral Oral   SpO2:  96% 96% 96%   Weight: 190 lb 11.2 oz (86.5 kg)      Height:           Physical Examination  General: No acute distress  Psychiatric: affect appropriate  Head/Eyes/Ears/Nose/Throat:  Atraumatic, vision and hearing intact, face symmetric  Neck:  supple  Chest/Lungs: no tachypnea, retractions or cyanosis noted  Cardiac:  Regular rate and rhythm  Abdomen: soft, nontender  Neuro: Cns grossly intact w/o lateralizing deficits  Extremities: warm and well perfused  - bilateral upper extremity motorsensory intact  - bilateral lower extremity motorsensory intact  Vascular exam:  - Radial: palp  Skin: no wounds    Labs  Lab Results   Component Value Date    WBC 5.9 01/16/2021    HGB 12.3 01/16/2021    HCT 36.5 01/16/2021    MCV 89.6 01/16/2021     01/16/2021     Lab Results   Component Value Date     01/16/2021    K 4.1 01/16/2021    K 4.3 01/15/2021     01/16/2021    CO2 23 01/16/2021    BUN 28 01/16/2021    CREATININE 1.5 01/16/2021      No components found for: GLU    Imaging: CTA  Mild fibrotic changes in the upper lung zones.  Diffuse arch  atherosclerosis.  50 percent stenosis proximal left subclavian  artery.  80-90 percent stenosis origin left vertebral artery with the  distal vessel patent.  Right subclavian artery patent.  Up to 50  percent stenosis proximal right vertebral artery at the origin with  the distal vessel patent. Bilateral common carotid arteries patent and nonstenotic.  Left bulb  calcifications result in less than 30 percent vessel narrowing. Right bulb calcifications result in greater than 70 percent stenosis  of the proximal right ICA. Prominent calcification of the cavernous carotids noted.   Supraclinoid carotids are patent.  Bilateral middle cerebral arteries  patent.  Bilateral anterior cerebral arteries patent.  No aneurysm or central branch occlusion identified. Proximal occlusion left posterior cerebral artery at the  perimesencephalic level.  Left posterior cerebral artery appears  grossly patent. Moderate degenerative changes of the spine.  Evidence of prior  cataract surgery.  Mild sinus inflammatory changes.  Unremarkable  thyroid gland. Assessment:   Carotid stenosis      Plan:  Patient is a 66 y.o. male presenting with concern for TIA. His MRI is negative. He has been placed on Eliquis. He has evidence of vertebral artery disease with 70% stenosis of proximal right ICA. His symptoms however do not appear to be grossly related to his carotid disease. He possibly had a global ischemic event however there is no evidence of stroke. I would follow him clinically as an outpatient and monitor his carotid stenosis. Would like to see him in 6 months with a duplex at that time. Should he have a recurrence of his symptoms then it may be worth discussing intervention earlier given that global ischemic events related to hypoperfusion can be rectified with carotid endarterectomy; however no acute need at this time. Discussed with family at bedside. Greater than 50% of my time was spent counseling the patient/family about the condition's pathology and proposed/planned treatment. Please call with any questions or concerns. Thank you for the consultation.     Sissy Epperson DO, [facility]  1/16/2021  4:35 PM

## 2021-01-16 NOTE — DISCHARGE SUMMARY
Hospital Medicine Discharge Summary    Patient ID: Mitchel Jasso      Patient's PCP: Franchesca Pelaez MD    Admit Date: 1/15/2021     Discharge Date:   01/16/2021    Admitting Physician: Dariel Sánchez DO     Discharge Physician: Stormy Martin MD     Discharge Diagnoses: Active Hospital Problems    Diagnosis    FLOR (acute kidney injury) ([facility] [N17.9]    Syncope [R55]    Elevated troponin [R77.8]    TIA (transient ischemic attack) [G45.9]    Pure hypercholesterolemia [E78.00]    Coronary artery disease involving native coronary artery of native heart without angina pectoris [I25.10]    Essential hypertension [I10]    Type 2 diabetes mellitus with complication ([facility] [V04.3]       The patient was seen and examined on day of discharge and this discharge summary is in conjunction with any daily progress note from day of discharge. Hospital Course:     From HPI:\"The patient is a 66 y.o. male with past medical history of FLOR, CAD with multiple stenting, type 2 diabetes, somewhat hard of hearing, hypertension, hyperlipidemia, and previous prostate cancer who presents to [facility] from [address] [place] [address] for concern of slurred speech as well as syncopal episode and some level of confusion after being found by family at home with the symptoms and brought him to the ED. Prior to this episode, patient notes that he did not have any other recent neurologic symptoms of weakness or slurred speech or dysphagia or vision changes or focal weakness. Patient notes he has never had a stroke in the past before.   Patient otherwise denies any other recent symptoms of fever, chills, lightheadedness, headaches, dizziness, syncope, chest pain, shortness of breath, blood in urine/stool/sputum, leg swelling out of the ordinary, back pain other urinary, dysuria, rashes.    Patient was seen at the [address] [place] [address] facility initially, at the time patient had arrived and was noted that his symptoms had overall fully resolved. Stroke team was consulted in the ED and noted he was not a TPA candidate, and notably patient's CT findings including the CTA of the head and neck noted an occluded left posterior cerebral artery with collaterals noted, also 70% or greater stenosis of the proximal right carotid artery from calcified plaque and also bilateral proximal vertebral artery stenosis and 80-90 % noted on the left with up to 50% on the right. Stroke team did not feel that currently patient needed to have an urgent neurovascular procedure and felt that patient could mainly just be transferred for neurology evaluation and likely MRI to work-up TIA versus stroke. Of note, patient's CBC noted a white count of 11.5, hemoglobin 12.7, and platelet count 209. Metabolic panel at the facility noted a creatinine of 1.6. Noted initial troponin was negative at that facility but a repeat troponin then noted to be positive at 0.063 elevated from 0.012 but EKG was reported as normal sinus rhythm with normal axis but T wave inversions in lead I, aVL, V5 and V6 with nonspecific T wave abnormality. \"      1. TIA, MRI negative, echo noted, statin, adjusted dose per cardiology, possible run of afib per cardiology started on amiodarone, spironolactone, procardia, and to follow up with cardiology as outpatient   2. Minimally elevated troponin, flat. cardiology consulted. No further workup recommended at this time. 3. AFIB, Eliquis, amiodarone   4. Right carotid artery stenosis, VS consulted, follow up as outpatient   5. Essential hypertension, resumed po medications. 6. DM, long and short acting insulin.            Physical Exam Performed:     /77   Pulse 65   Temp 98.2 °F (36.8 °C) (Oral)   Resp 18   Ht 5' 9\" (1.753 m)   Wt 190 lb 11.2 oz (86.5 kg)   SpO2 96%   BMI 28.16 kg/m² PER PROGRESS NOTE     Labs: For convenience and continuity at follow-up the following most recent labs are provided:      CBC:    Lab Results   Component Value Date    WBC 5.9 01/16/2021    HGB 12.3 01/16/2021    HCT 36.5 01/16/2021     01/16/2021       Renal:    Lab Results   Component Value Date     01/16/2021    K 4.1 01/16/2021    K 4.3 01/15/2021     01/16/2021    CO2 23 01/16/2021    BUN 28 01/16/2021    CREATININE 1.5 01/16/2021    CALCIUM 9.8 01/16/2021         Significant Diagnostic Studies    Radiology:   MRI brain without contrast   Final Result   1. No acute intracranial abnormality. Specifically, no acute infarction. 2. Diffuse parenchymal volume loss and sequela of mild-to-moderate chronic   microvascular ischemic changes. 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 3. Mucosal thickening of the paranasal sinuses with right maxillary sinus   air-fluid level. Clinical correlation for acute sinusitis suggested.                 Consults:     IP CONSULT TO NEUROLOGY  IP CONSULT TO CARDIOLOGY  IP CONSULT TO VASCULAR SURGERY  IP CONSULT TO HOME CARE NEEDS    Disposition:  home     Condition at Discharge: Stable    Discharge Instructions/Follow-up:  PCP, CARDIOLOGY, VS     Code Status:  Full Code     Activity: activity as tolerated    Diet: diabetic diet      Discharge Medications:     Current Discharge Medication List           Details   amiodarone (CORDARONE) 200 MG tablet Take 1 tablet by mouth 2 times daily  Qty: 60 tablet, Refills: 0      apixaban (ELIQUIS) 5 MG TABS tablet Take 1 tablet by mouth 2 times daily  Qty: 60 tablet, Refills: 0      atorvastatin (LIPITOR) 10 MG tablet Take 1 tablet by mouth nightly  Qty: 30 tablet, Refills: 0      spironolactone (ALDACTONE) 25 MG tablet Take 1 tablet by mouth daily  Qty: 30 tablet, Refills: 0      NIFEdipine (ADALAT CC) 30 MG extended release tablet Take 1 tablet by mouth daily Qty: 30 tablet, Refills: 0              Details   doxazosin (CARDURA) 4 MG tablet TAKE ONE TABLET BY MOUTH DAILY  Qty: 30 tablet, Refills: 2      lisinopril (PRINIVIL;ZESTRIL) 20 MG tablet Take 1 tablet by mouth daily  Qty: 90 tablet, Refills: 3      clopidogrel (PLAVIX) 75 MG tablet Take 1 tablet by mouth daily . Loading dose of 300 mg (4 tablets) on 1st day only. Qty: 30 tablet, Refills: 11      metoprolol succinate (TOPROL XL) 50 MG extended release tablet TAKE ONE TABLET BY MOUTH DAILY  Qty: 90 tablet, Refills: 3      insulin lispro (HUMALOG) 100 UNIT/ML injection vial Inject into the skin 3 times daily (before meals) 12 units with breakfast  14 units with lunch  16 units with dinner      loratadine (CLARITIN) 10 MG tablet Take 10 mg by mouth daily [employer]      insulin glargine (LANTUS) 100 UNIT/ML injection vial Inject 32 Units into the skin 2 times daily       nitroGLYCERIN (NITROSTAT) 0.4 MG SL tablet Place 1 tablet under the tongue every 5 minutes as needed for Chest pain  Qty: 25 tablet, Refills: 3             Time Spent on discharge is more than 45 minutes in the examination, evaluation, counseling and review of medications and discharge plan. Signed:    Franc Brown MD   1/16/2021      Thank you Janice Zuleta MD for the opportunity to be involved in this patient's care. If you have any questions or concerns please feel free to contact me at [phone].

## 2021-01-16 NOTE — DISCHARGE INSTR - COC
Continuity of Care Form    Patient Name: Sera Welch   :  1945  MRN:  [id number]    Admit date:  1/15/2021  Discharge date:  21    Code Status Order: Full Code   Advance Directives:   [facility] Documentation       Date/Time Healthcare Directive Type of Healthcare Directive Copy in [address] Agent's Name Healthcare Agent's Phone Number    01/15/21 0157  Yes, patient has an advance directive for healthcare treatment  Durable power of  for health care  No, copy requested from family  [address]              Admitting Physician:  Anisha Mcbride DO  PCP: Ely Gan MD    Discharging Nurse: [facility] Unit/Room#: [id number]-9189/9608-77  Discharging Unit Phone Number: [phone]    Emergency Contact:   Extended Emergency Contact Information  Primary Emergency Contact: [facility]  Address: [address] [place] [address], [address] [place] [address] Phone: [phone]  Relation: Spouse  Secondary Emergency Contact: Emily Medrano  Address: [address] [place] [address], [address] [place] [address] Phone: [phone]  Relation: Spouse    Past Surgical History:  Past Surgical History:   Procedure Laterality Date    CORONARY ANGIOPLASTY WITH STENT PLACEMENT  2016    DENA to mid circ    CYSTOSCOPY  2018    cysto, [place] TURP    HERNIA REPAIR Bilateral distant past    SHOULDER SURGERY Bilateral 10-12 years ago       Immunization History: There is no immunization history on file for this patient.     Active Problems:  Patient Active Problem List   Diagnosis Code    Acute transmural inferior wall MI ([address] I21.19    Essential hypertension I10    Hypokalemia E87.6    Type 2 diabetes mellitus with complication (HCC) A33.2    SOB (shortness of breath) R06.02    Coronary artery disease involving native coronary artery of native heart without angina pectoris I25.10  History of ST elevation myocardial infarction (STEMI) I25.2    Pure hypercholesterolemia E78.00    Pure hypercholesterolemia E78.00    Prostate cancer ([facility] C61    Retention, urine R33.9    S/P left heart catheterization by percutaneous approach Z98.890    NSTEMI (non-ST elevated myocardial infarction) (HCC) I21.4    TIA (transient ischemic attack) G45.9    FLOR (acute kidney injury) ([facility] N17.9    Syncope R55    Elevated troponin R77.8       Isolation/Infection:   Isolation            No Isolation          Patient Infection Status       None to display            Nurse Assessment:  Last Vital Signs: /77   Pulse 65   Temp 98.2 °F (36.8 °C) (Oral)   Resp 18   Ht 5' 9\" (1.753 m)   Wt 190 lb 11.2 oz (86.5 kg)   SpO2 96%   BMI 28.16 kg/m²     Last documented pain score (0-10 scale): Pain Level: 0  Last Weight:   Wt Readings from Last 1 Encounters:   01/16/21 190 lb 11.2 oz (86.5 kg)     Mental Status:  oriented and alert    IV Access:  - None    Nursing Mobility/ADLs:  Walking   Assisted  Transfer  Assisted  Bathing  Independent  Dressing  Independent  Toileting  Independent  Feeding  Independent  Med Admin  Independent  Med Delivery   whole    Wound Care Documentation and Therapy:        Elimination:  Continence:   · Bowel: Yes  · Bladder: Yes  Urinary Catheter: None   Colostomy/Ileostomy/Ileal Conduit: No       Date of Last BM:     Intake/Output Summary (Last 24 hours) at 1/16/2021 1729  Last data filed at 1/16/2021 1354  Gross per 24 hour   Intake 780 ml   Output 350 ml   Net 430 ml     I/O last 3 completed shifts: In: 80 [P.O.:780]  Out: 350 [Urine:350]    Safety Concerns:     None    Impairments/Disabilities:      None    Nutrition Therapy:  Current Nutrition Therapy:   - Oral Diet:  General    Routes of Feeding: Oral  Liquids:  Thin Liquids  Daily Fluid Restriction: no  Last Modified Barium Swallow with Video (Video Swallowing Test): not done Treatments at the Time of Hospital Discharge:   Respiratory Treatments:   Oxygen Therapy:  is not on home oxygen therapy. Ventilator:    - No ventilator support    Rehab Therapies: Physical Therapy and Occupational Therapy  Weight Bearing Status/Restrictions: No weight bearing restirctions  Other Medical Equipment (for information only, NOT a DME order):  walker  Other Treatments:     Patient's personal belongings (please select all that are sent with patient):  Glasses, Hearing Aides right    RN SIGNATURE:  Electronically signed by Zo Carrasquillo RN on 1/16/21 at 6:05 PM EST    CASE MANAGEMENT/SOCIAL WORK SECTION    Inpatient Status Date: 1-    Readmission Risk Assessment Score:  Readmission Risk              Risk of Unplanned Readmission:        17           Discharging to Facility/ Agency   · Name:      [address] [place] [address]  · Address:  · Phone:      [phone]  · Fax:      / signature: Deepthi Kelly     Case Management   [phone]    1/16/2021  5:53 PM      PHYSICIAN SECTION    Prognosis: Good    Condition at Discharge: Stable    Rehab Potential (if transferring to Rehab): Good    Recommended Labs or Other Treatments After Discharge: PT, OT, nursing services, follow up with PCP in 3 days, follow up with cardiology in 1 week, follow up with [address] 6 months     Physician Certification: I certify the above information and transfer of Basilia Monterroso  is necessary for the continuing treatment of the diagnosis listed and that he requires Home Care for greater 30 days.      Update Admission H&P: No change in H&P    PHYSICIAN SIGNATURE:  Electronically signed by Angeles Edmond MD on 1/16/21 at 5:30 PM EST

## 2021-01-16 NOTE — PROGRESS NOTES
Dc iv  Dc monitor  Dc instruction and education completed  All questions answered  Follow up and eliquis script given  Dc per wc wife at bedside

## 2021-01-16 NOTE — CARE COORDINATION
LSW FAXED ORDERS TO [facility].   Doroteo Delgado     Case Management   [phone]    1/16/2021  5:58 PM

## 2021-01-16 NOTE — PROGRESS NOTES
Hospitalist Progress Note      PCP: Antolin Mcgarry MD    Date of Admission: 1/15/2021      Subjective: up to chair, feels ok, no slurred speech or weakness, no palpitation, nausea or vomiting. Medications:  Reviewed    Infusion Medications    dextrose       Scheduled Medications    sodium chloride flush  10 mL Intravenous 2 times per day    insulin lispro  0-6 Units Subcutaneous TID WC    insulin lispro  0-3 Units Subcutaneous Nightly    insulin glargine  35 Units Subcutaneous Daily    doxazosin  4 mg Oral Nightly    lisinopril  20 mg Oral Daily    metoprolol succinate  50 mg Oral Daily    clopidogrel  75 mg Oral Daily    apixaban  5 mg Oral BID    atorvastatin  10 mg Oral Nightly    amiodarone  200 mg Oral BID    spironolactone  25 mg Oral Daily    NIFEdipine  30 mg Oral Daily     PRN Meds: sodium chloride flush, ondansetron, glucose, dextrose, glucagon (rDNA), dextrose, hydrALAZINE, perflutren lipid microspheres      Intake/Output Summary (Last 24 hours) at 1/16/2021 0909  Last data filed at 1/15/2021 2126  Gross per 24 hour   Intake 720 ml   Output 550 ml   Net 170 ml       Physical Exam Performed:    BP (!) 96/54   Pulse 57   Temp 97.9 °F (36.6 °C) (Oral)   Resp 16   Ht 5' 9\" (1.753 m)   Wt 190 lb 11.2 oz (86.5 kg)   SpO2 94%   BMI 28.16 kg/m²     General appearance: No apparent distress  Neck: Supple  Respiratory:  Normal respiratory effort. Clear to auscultation, bilaterally without Rales/Wheezes/Rhonchi. Cardiovascular: Regular rate and rhythm with normal S1/S2 without murmurs, rubs or gallops. Abdomen: Soft, non-tender, non-distended   Musculoskeletal: No clubbing, cyanosis   Skin: Skin color, texture, turgor normal.  No rashes or lesions.   Neurologic:  No focal weakness   Psychiatric: Alert and oriented  Capillary Refill: Brisk,< 3 seconds   Peripheral Pulses: +2 palpable, equal bilaterally       Labs:   Recent Labs     01/15/21  0341 01/16/21  0436   WBC 8.1 5.9 HGB 12.6* 12.3*   HCT 38.2* 36.5*    202     Recent Labs     01/15/21  0341 01/16/21  0436   * 138   K 4.3 4.1    104   CO2 22 23   BUN 24* 28*   CREATININE 1.5* 1.5*   CALCIUM 9.8 9.8     Recent Labs     01/16/21  0436   AST 13*   ALT 14   BILITOT 0.4   ALKPHOS 85     No results for input(s): INR in the last 72 hours. Recent Labs     01/15/21  0341 01/15/21  1350   TROPONINI 0.03* <0.01       Urinalysis:    No results found for: Truchas Fey, BACTERIA, RBCUA, BLOODU, [address]    Radiology:  MRI brain without contrast   Final Result   1. No acute intracranial abnormality. Specifically, no acute infarction. 2. Diffuse parenchymal volume loss and sequela of mild-to-moderate chronic   microvascular ischemic changes. 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 3. Mucosal thickening of the paranasal sinuses with right maxillary sinus   air-fluid level. Clinical correlation for acute sinusitis suggested. Assessment/Plan:    Active Hospital Problems    Diagnosis    FLOR (acute kidney injury) ([facility] [N17.9]    Syncope [R55]    Elevated troponin [R77.8]    TIA (transient ischemic attack) [G45.9]    Pure hypercholesterolemia [E78.00]    Coronary artery disease involving native coronary artery of native heart without angina pectoris [I25.10]    Essential hypertension [I10]    Type 2 diabetes mellitus with complication ([facility] [O30.8]       1. TIA, MRI negative, discussed with neurology, statin, asa.   2. Minimally elevated troponin, flat. cardiology consulted. No further workup recommended at this time. 3. AFIB, Eliquis  4. Right carotid artery stenosis, neurology recommended VS consult, placed  5. Essential hypertension, resumed po medications. 6. DM, long and short acting insulin.      Diet: DIET LOW SODIUM 2 GM; Carb Control: 4 carb choices (60 gms)/meal  Code Status: Full Code        Aparna Rojas MD

## 2021-01-16 NOTE — PROGRESS NOTES
Occupational Therapy  Facility/Department: [address] PROGRESSIVE CARE  Daily Treatment Note  NAME: Basilia Monterroso  : 1945  MRN: [id number]    Date of Service: 2021    Discharge Recommendations:  Home with assist PRN, Home with Home health OT, [address] Angel Oliveros scored a 20/24 on the AM-PAC ADL Inpatient form. Current research shows that an AM-PAC score of 18 or greater is typically associated with a discharge to the patient's home setting. Based on the patient's AM-PAC score, and their current ADL deficits, it is recommended that the patient have 2-3 sessions per week of Occupational Therapy at d/c to increase the patient's independence. At this time, this patient demonstrates the endurance and safety to discharge home with home (home vs OP services) and a follow up treatment frequency of 2-3x/wk. Please see assessment section for further patient specific details. If patient discharges prior to next session this note will serve as a discharge summary. Please see below for the latest assessment towards goals. HOME HEALTH CARE: LEVEL 1 STANDARD     -Initial home health evaluation to occur within 24-48 hours, in patient home    -Home health agency to establish plan of care for patient over 60 day period    -Medication Reconciliation    -PCP Visit scheduled within seven days of discharge    -PT/OT to evaluate with goal of regaining prior level of functioning    -OT to evaluate if patient has [address] needs for personal care       Assessment: Discussed with OTR am pac score is 20. Anticipate that patient will be safe to return home with family support and home OT to tranistion to home. Functional mobility with RW with CGA/SBA slow steady gait with no overt LOB noted. SBA for sit<>stand from recliner chair to RW to recliner chair. Will continue with POC. Patient Diagnosis(es): There were no encounter diagnoses. has a past medical history of FLOR (acute kidney injury) ([facility] Asthma, CAD (coronary artery disease), Diabetes mellitus ([facility] [facility] [place] (hard of hearing), HTN (hypertension), Hyperlipidemia, Melanoma ([facility] Prostate CA ([facility] and Syncope.   has a past surgical history that includes hernia repair (Bilateral, distant past); shoulder surgery (Bilateral, 10-12 years ago); Coronary angioplasty with stent (5/1/2016); and Cystocopy (08/22/2018). Restrictions  Restrictions/Precautions  Restrictions/Precautions: Fall Risk  Subjective   General  Chart Reviewed: Yes  Patient assessed for rehabilitation services?: Yes  Additional Pertinent Hx: 65 y/o male presented to [facility] 1/15/2021 from [facility] for syncopal episode, slurred speech, and confusion. CT head negative for findings. MRI showed no acute intracranial abnormality. Specifically, no acute infarction. Response to previous treatment: Patient with no complaints from previous session  Family / Caregiver Present: No  Referring Practitioner: Chris Scott DO  Subjective  Subjective: Patient seated in recliner chair upon arrival to room. Patient agreeable to therapy. General Comment  Comments: Per RN ok for therapy.       Orientation  Orientation  Overall Orientation Status: Within Functional Limits  Objective             Balance  Sitting Balance: Stand by assistance  Standing Balance: Stand by assistance  Standing Balance  Activity: Static standing with RW  Functional Mobility  Functional - Mobility Device: Rolling Walker  Activity: Other  Assist Level: Contact guard assistance  Functional Mobility Comments: Functional mobility with RW with CGA/SBA slow steady gait, with no overt LOB noted  Bed mobility  Supine to Sit: Unable to assess  Sit to Supine: Unable to assess  Comment: up in chair at start and end of session  Transfers  Sit to stand: Stand by assistance  Stand to sit: Stand by assistance  Transfer Comments: to/from [employer] Overall Cognitive Status: WFL     Assessment   Performance deficits / Impairments: Decreased strength;Decreased functional mobility ; Decreased endurance;Decreased high-level IADLs;Decreased balance;Decreased safe awareness  Assessment: Discussed with OTR am pac score is 20. Anticipate that patient will be safe to return home with family support and home OT to tranistion to home. Functional mobility with RW with CGA/SBA slow steady gait with no overt LOB noted. SBA for sit<>stand from recliner chair to RW to recliner chair. Will continue with POC. OT Education: OT Role;Plan of Care;Transfer Training  REQUIRES OT FOLLOW UP: Yes  Activity Tolerance  Activity Tolerance: Patient Tolerated treatment well  Safety Devices  Safety Devices in place: Yes  Type of devices: Left in chair;Nurse notified;Gait belt;Call light within reach; Chair alarm in place        Plan   Plan  Times per week: 3-5  Current Treatment Recommendations: Strengthening, Functional Mobility Training, Gait Training, Balance Training, Endurance Training, Self-Care / ADL    AM-[facility] Score        [facility] Inpatient Daily Activity Raw Score: 20 (01/16/21 [facility])  AM-PAC Inpatient ADL T-Scale Score : 42.03 (01/16/21 [facility])  ADL Inpatient CMS 0-100% Score: 38.32 (01/16/21 [facility])  ADL Inpatient CMS G-Code Modifier : CJ (01/16/21 [facility])    Goals  Short term goals  Time Frame for Short term goals: Prior to DC: all goals ongoing  Short term goal 1: Pt will complete ADL transfers/mobility with supervision  Short term goal 2: Pt will tolerate standing > 5 min for functional tasks with supervision  Short term goal 3: Pt will complete toileting with supervision  Short term goal 4: Pt will complete bed mobility from flat bed in prep for ADL transfers with supervision  Patient Goals   Patient goals : to return home       Therapy Time   Individual Concurrent Group Co-treatment   Time In 1200         Time Out 1230         Minutes 30 Electronically signed by Ramon Albright, [id number] on 1/16/2021 at 2:04 PM

## 2021-01-27 NOTE — PATIENT INSTRUCTIONS
Patient Education        Learning About Coronary Artery Disease (CAD)  What is coronary artery disease? Coronary artery disease (CAD) occurs when plaque builds up in the arteries that bring oxygen-rich blood to your heart. Plaque is a fatty substance made of cholesterol, calcium, and other substances in the blood. This process is called hardening of the arteries, or atherosclerosis. What happens when you have coronary artery disease? · Plaque may narrow the coronary arteries. Narrowed arteries cause poor blood flow. This can lead to angina symptoms such as chest pain or discomfort. If blood flow is completely blocked, you could have a heart attack. · You can slow CAD and reduce the risk of future problems by making changes in your lifestyle. These include quitting smoking and eating heart-healthy foods. · Treatments for CAD, along with changes in your lifestyle, can help you live a longer and healthier life. How can you prevent coronary artery disease? · Do not smoke. It may be the best thing you can do to prevent heart disease. If you need help quitting, talk to your doctor about stop-smoking programs and medicines. These can increase your chances of quitting for good. · Be active. Get at least 30 minutes of exercise on most days of the week. Walking is a good choice. You also may want to do other activities, such as running, swimming, cycling, or playing tennis or team sports. · Eat heart-healthy foods. Eat more fruits and vegetables and less foods that contain saturated and trans fats. Limit alcohol, sodium, and sweets. · Stay at a healthy weight. Lose weight if you need to. · Manage other health problems such as diabetes, high blood pressure, and high cholesterol. How is coronary artery disease treated? · Your doctor will suggest that you make lifestyle changes. For example, your doctor may ask you to eat healthy foods, quit smoking, lose extra weight, and be more active. · You will have to take medicines. · Your doctor may suggest a procedure to open narrowed or blocked arteries. This is called angioplasty. Or your doctor may suggest using healthy blood vessels to create detours around narrowed or blocked arteries. This is called bypass surgery. Follow-up care is a key part of your treatment and safety. Be sure to make and go to all appointments, and call your doctor if you are having problems. It's also a good idea to know your test results and keep a list of the medicines you take. Where can you learn more? Go to https://[employer]. org and sign in to your [employer] account. Enter [phone] in the [facility] box to learn more about \"Learning About Coronary Artery Disease (CAD). \"     If you do not have an account, please click on the \"Sign Up Now\" link. Current as of: December 16, 2019               Content Version: 12.6  © 1319-8439 [employer]. Care instructions adapted under license by [facility] ([facility]). If you have questions about a medical condition or this instruction, always ask your healthcare professional. [place] [address] any warranty or liability for your use of this information.        Decrease amiodarone to 200 mg daily in 2/13/21

## 2021-01-27 NOTE — PROGRESS NOTES
Fouzia Hess      Cardiology Consult    Pansy Romberg  1945 January 28, 2021    Reason for Visit: CAD, PAF    CC: \"Blood pressure has been low\"     HPI:  The patient is 66 y.o. male with a past medical history significant for CAD s/p PCI, HTN, and DM who presents for management of CAD. He was hospitalized 5/1-5/3/2016 after presenting to [facility] with left arm pain and chest pressure and was found to have acute inferior wall MI. He was transferred to [facility] for urgent cardiac cath which revealed a 100% mid Lcx stenosis that was successfully intervened with DENA placed. He has residual disease in his LAD, diagnoal & distal RCA. He was admitted to [facility] 2/2017 with atypical chest pains. He ruled out for an MI and underwent stress testing which did not show any reversible defects. He was seen by Dr. Shaina Clifton in outpatient follow up and underwent PCI of the first PDA, diagonal branch and FFR of the lesions in the left circumflex artery and LAD. He was admitted 1/16/21 to [facility] from [facility] with slurred speech, confusion, and syncope. His MRI showed nothing acute but multiple old infarcts. He was seen by Dr. Marlena Nieves for elevated troponin which did not require any further inpatient cardiac work up. While on telemetry, a short run of Afib was noted and he was started on Amiodarone and Eliquis 5 mg bid. His BP was markedly elevated during the hospitalization and Dr. Marlena Nieves adjusted medications. Today, he states he is doing well and denies any new cardiac sounding complaints. However, his blood pressure has now been running low since starting the two new BP medications. He denies any worsening shortness of breath, chest pains, or palpitations. He has held lisinopril for the past 5 days. He denies any syncope or lightheadedness. He reports compliance with the other medications and denies any abnormal bleeding or bruising. He reports chronic LE edema (L>R) that is unchanged.  He ambulates with a cane and denies any recent falls. Patient denies exertional chest pain/pressure, dyspnea at rest, MERCEDES, PND, orthopnea, weight changes, changes in LE edema, and syncope. Past Medical History:   Diagnosis Date    Asthma     as a child-not current    CAD (coronary artery disease)     CKD (chronic kidney disease)     [facility]     Diabetes mellitus ([facility]    [employer] (hard of hearing)     wears bilat hearing aids    HTN (hypertension)     Hyperlipidemia     Melanoma ([facility]     back    PAF (paroxysmal atrial fibrillation) [facility]     Prostate CA ([facility]     Syncope 01/15/2021     Past Surgical History:   Procedure Laterality Date    CORONARY ANGIOPLASTY WITH STENT PLACEMENT  2016    DENA to mid circ    CYSTOSCOPY  2018    cysto, [place] [facility]    HERNIA REPAIR Bilateral distant past    SHOULDER SURGERY Bilateral 10-12 years ago     History reviewed. No pertinent family history. Social History     Tobacco Use    Smoking status: Former Smoker     Types: Cigarettes     Quit date: 1986     Years since quittin.7    Smokeless tobacco: Never Used   Substance Use Topics    Alcohol use: No     Alcohol/week: 0.0 standard drinks    Drug use: No     Allergies   Allergen Reactions    Doxycycline Monohydrate      Unsure of reaction    Tetracyclines & Related Other (See Comments)     welts    Bactrim [Sulfamethoxazole-Trimethoprim] Rash     Current Outpatient Medications   Medication Sig Dispense Refill    doxazosin (CARDURA) 2 MG tablet 1 tablet daily      lisinopril (PRINIVIL;ZESTRIL) 20 MG tablet Take 1 tablet by mouth daily 90 tablet 3    amiodarone (CORDARONE) 200 MG tablet Take 1 tablet by mouth daily 30 tablet 3    apixaban (ELIQUIS) 5 MG TABS tablet Take 1 tablet by mouth 2 times daily 60 tablet 0    atorvastatin (LIPITOR) 10 MG tablet Take 1 tablet by mouth nightly 30 tablet 0    clopidogrel (PLAVIX) 75 MG tablet Take 1 tablet by mouth daily .  Loading dose of 300 mg (4 tablets) on 1st day only. 30 tablet 11    metoprolol succinate (TOPROL XL) 50 MG extended release tablet TAKE ONE TABLET BY MOUTH DAILY 90 tablet 3    insulin lispro (HUMALOG) 100 UNIT/ML injection vial Inject into the skin 3 times daily (before meals) 12 units with breakfast  14 units with lunch  16 units with dinner      nitroGLYCERIN (NITROSTAT) 0.4 MG SL tablet Place 1 tablet under the tongue every 5 minutes as needed for Chest pain 25 tablet 3    loratadine (CLARITIN) 10 MG tablet Take 10 mg by mouth daily kroger brand      insulin glargine (LANTUS) 100 UNIT/ML injection vial Inject 32 Units into the skin 2 times daily        No current facility-administered medications for this visit. Review of Systems:  · Constitutional: no unanticipated weight loss. There's been no change in sleep pattern, or activity level. No fevers, chills. Fatigue. · Eyes: No visual changes or diplopia. No scleral icterus. · ENT: No Headaches, hearing loss or vertigo. No mouth sores or sore throat. · Cardiovascular: as reviewed in HPI  · Respiratory: No cough or wheezing, no sputum production. No hematemesis. · Gastrointestinal: No abdominal pain, appetite loss, blood in stools. No change in bowel or bladder habits. · Genitourinary: No dysuria, trouble voiding, or hematuria. · Musculoskeletal:  No gait disturbance, no joint complaints. Muscle weakness bilateral legs. · Integumentary: No rash or pruritis. · Neurological: No headache, diplopia, change in muscle strength, numbness or tingling. · Psychiatric: No anxiety or depression. · Endocrine: No temperature intolerance. No excessive thirst, fluid intake, or urination. No tremor. · Hematologic/Lymphatic: No abnormal bruising or bleeding, blood clots or swollen lymph nodes. · Allergic/Immunologic: No nasal congestion or hives.     Physical Exam:   /60 (Site: Left Upper Arm, Position: Sitting, Cuff Size: Medium Adult)   Pulse 66   Temp 96.8 °F (36 °C) Ht 5' 9\" (1.753 m)   Wt 193 lb 12.8 oz (87.9 kg)   SpO2 98%   BMI 28.62 kg/m²   Wt Readings from Last 3 Encounters:   01/28/21 193 lb 12.8 oz (87.9 kg)   01/16/21 190 lb 11.2 oz (86.5 kg)   07/29/20 194 lb 9.6 oz (88.3 kg)     Constitutional: He is oriented to person, place, and time. He appears well-developed and well-nourished. In no acute distress. Head: Normocephalic and atraumatic. Pupils equal and round. Neck: Neck supple. No JVP or carotid bruit appreciated. No mass and no thyromegaly present. No lymphadenopathy present. Cardiovascular: Normal rate. Normal heart sounds. Exam reveals no gallop and no friction rub. No murmur heard. Pulmonary/Chest: Effort normal and breath sounds normal. No respiratory distress. He has no wheezes, rhonchi or rales. Abdominal: Soft, non-tender. Bowel sounds are normal. He exhibits no organomegaly, mass or bruit. Extremities: 1+ BLE edema. No cyanosis or clubbing. Pulses are 2+ radial/dorsalis pedis/posterior tibial/carotid bilaterally. Neurological: No gross cranial nerve deficit. Coordination normal.   Skin: Skin is warm and dry. There is no rash or diaphoresis. Psychiatric: He has a normal mood and affect. His speech is normal and behavior is normal.     Lab Review:   FLP:  2/2017 BUN30, Cr 1.7  FLP 9/2018 BUN 34, Cr 1.5. K 4.7. Lab Results   Component Value Date    TRIG 54 01/15/2021    HDL 44 01/15/2021    LDLCALC 49 01/15/2021    LABVLDL 11 01/15/2021     BUN/Creatinine:  9/2018 , Trig 115, LDL 63, HDL 62  Lab Results   Component Value Date    BUN 28 01/16/2021    CREATININE 1.5 01/16/2021     EKG Interpretation: 10/7/16   Sinus rhythm. Nonspecific T-waves abnormality. [facility] 5/1/2016:  1. Left main trunk: It arises from the left sinus of Valsalva. It divides into left anterior descending artery and left circumflex artery. The left main trunk is free of atherosclerosis. 2.  Left anterior descending artery:   It is a moderate-sized artery and it descends into the intraventricular sulcus and wraps around the apex of the heart. The left main trunk is free of atherosclerosis. 3.  Left anterior descending artery: It is a moderate-sized artery. It descends into the intraventricular sulcus and wraps around the apex of the heart. The left anterior descending artery has about 50% stenosis in the mid to distal portion. There is also a 50% stenosis of the diagonal branch in the proximal segment. The lumen of the left anterior descending artery is free of any significant atherosclerosis. 4.  Left circumflex artery: It arises from the left main trunk. It is 100% occluded after a 1st obtuse marginal branch. The right coronary artery arises from right sinus on Valsalva. It is a dominant artery. It gives rise to a posterior descending and posterolateral branches. Right coronary artery has evidence of about 90% to 95% stenosis in the mid to distal portion of the PDA. There is also about 50% to 60% stenosis of the 2nd PDA in the midsegment, but it does not appear hemodynamically significant. 5.  Left ventriculogram reveals overall preserved left ventricular systolic function. Successful angioplasty followed by stent deployment in the mid circumflex artery using a drug-eluting stent, 100% stenosis reduced to 0% using 3.25 by 18 Alpine stent. Final diameter was 3.34 with 0% residual stenosis. SAMEER grade-3 flow was established. There appears to be a 40% to 50% stenosis of the distal circumflex branch beyond the area of the stenosis. There appears to be also a microembolization in a small distal circumflex branch. Echo 5/2/2016:  Left ventricle size is normal.  Mild concentric left ventricular hypertrophy is present. Ejection fraction is visually estimated to be 50-55 %. There is reversal of E/A inflow velocities across the mitral valve suggesting impaired left ventricular relaxation. The left atrium is dilated.     Myoview 2/22/17 [facility] report) Fixed defect at the \"superior aspect of the inferolateral wall. \"  EF 52%. Cath 12/2019  Previous stent in the mid left circumflex artery is widely patent. Beyond the stent there is moderate disease with 60% stenosis. LAD-mid LAD has moderate disease. First diagonal branch has 90% segmental stenosis. RCA PDA1 has 90% stenosis. PDA2 has 80% stenosis. PCI of the first PDA, diagonal branch and FFR of the lesions in the left circumflex artery and LAD. CT 1/14/21  Occluded left posterior cerebral artery, there is collerateralization to the distal circulation. 70% or greater right carotid artery, bilateral vertebral artery stenosis, 80-90% on the left and up to 50% on the right. Echo 1/15/21  There is concentric left ventricular hypertrophy. Ejection fraction is visually estimated to be 31-53%  Grade I diastolic dysfunction with normal LV filling pressures. Normal right ventricular size and function. Assessment and Plan:  1. CAD s/p STEMI (2016) and DENA Lcx (2016), PDA and diag (2019). Asymptomatic. Continue with medical management and risk factor modification including Plavix, statin, and B-blocker. 2. Paroxysmal atrial fibrillation. Currently in sinus rhythm. Asymptomatic. CHADS-Vasc is at least 7 (HTN, age, DM, CVA, CAD). Treatment options for atrial fibrillation discussed including rate control, anticoagulation options, antiarrhythmics, and cardioversion. Continue Eliquis 5 mg bid and will reduce amiodarone to 200 mg daily in 2 weeks. 3. PAD (L-subclavian stenosis)/carotid stenosis (70% R-ICA)/Occluded L-PCA. Continue anticoagulation and antiplatelet therapy. Continue statin but change to high intensity dosing. 4. Essential hypertension. Now with episodes of hypotension. Will discontinue Nifedipine and aldactone. Will restart lisinopril 20 mg daily. Repeat BMP and blood pressure check in 1-2 weeks. Encouraged to monitor blood pressure at home. 5. CKD. Stage III. Creatinine 1.5.  Will repeat lab work in 1 week with medication changes and BP fluctuations. Follow up in 1 week for BP check and repeat lab work. Follow up in 3 months. Thank you very much for allowing me to participate in the care of your patient. Please do not hesitate to contact me if you have any questions. Sincerely,  Leilani Ng. Virginia Morocho, [address] [facility], [address] [place] [address]  Ph: [phone]  Fax: [phone]    This note was scribed in the presence of Dr Virginia Morocho MD by Maricruz Muro RN. Physician Attestation: The scribes documentation has been prepared under my direction and personally reviewed by me in its entirety. I confirm that the note above accurately reflects all work, treatment, procedures, and medical decision making performed by me. All portions of the note including but not limited to the chief complaint, history of present illness, physical exam, assessment and plan/medical decision making were personally reviewed, edited, and updated on the day of the visit.

## 2021-01-28 ENCOUNTER — OFFICE VISIT (OUTPATIENT)
Dept: CARDIOLOGY CLINIC | Age: 76
End: 2021-01-28
Payer: MEDICARE

## 2021-01-28 VITALS
TEMPERATURE: 96.8 F | BODY MASS INDEX: 28.71 KG/M2 | OXYGEN SATURATION: 98 % | HEART RATE: 66 BPM | SYSTOLIC BLOOD PRESSURE: 100 MMHG | HEIGHT: 69 IN | DIASTOLIC BLOOD PRESSURE: 60 MMHG | WEIGHT: 193.8 LBS

## 2021-01-28 DIAGNOSIS — I65.21 CAROTID STENOSIS, RIGHT: ICD-10-CM

## 2021-01-28 DIAGNOSIS — I48.0 PAF (PAROXYSMAL ATRIAL FIBRILLATION) (HCC): ICD-10-CM

## 2021-01-28 DIAGNOSIS — N18.31 STAGE 3A CHRONIC KIDNEY DISEASE (HCC): ICD-10-CM

## 2021-01-28 DIAGNOSIS — I10 ESSENTIAL HYPERTENSION: ICD-10-CM

## 2021-01-28 DIAGNOSIS — I25.2 HISTORY OF ST ELEVATION MYOCARDIAL INFARCTION (STEMI): ICD-10-CM

## 2021-01-28 DIAGNOSIS — I25.10 CORONARY ARTERY DISEASE INVOLVING NATIVE CORONARY ARTERY OF NATIVE HEART WITHOUT ANGINA PECTORIS: Primary | ICD-10-CM

## 2021-01-28 DIAGNOSIS — I73.9 PAD (PERIPHERAL ARTERY DISEASE) (HCC): ICD-10-CM

## 2021-01-28 PROCEDURE — G8484 FLU IMMUNIZE NO ADMIN: HCPCS | Performed by: INTERNAL MEDICINE

## 2021-01-28 PROCEDURE — G8417 CALC BMI ABV UP PARAM F/U: HCPCS | Performed by: INTERNAL MEDICINE

## 2021-01-28 PROCEDURE — 1123F ACP DISCUSS/DSCN MKR DOCD: CPT | Performed by: INTERNAL MEDICINE

## 2021-01-28 PROCEDURE — 4040F PNEUMOC VAC/ADMIN/RCVD: CPT | Performed by: INTERNAL MEDICINE

## 2021-01-28 PROCEDURE — 1111F DSCHRG MED/CURRENT MED MERGE: CPT | Performed by: INTERNAL MEDICINE

## 2021-01-28 PROCEDURE — G8427 DOCREV CUR MEDS BY ELIG CLIN: HCPCS | Performed by: INTERNAL MEDICINE

## 2021-01-28 PROCEDURE — 1036F TOBACCO NON-USER: CPT | Performed by: INTERNAL MEDICINE

## 2021-01-28 PROCEDURE — 99214 OFFICE O/P EST MOD 30 MIN: CPT | Performed by: INTERNAL MEDICINE

## 2021-01-28 RX ORDER — ROSUVASTATIN CALCIUM 20 MG/1
20 TABLET, COATED ORAL DAILY
Qty: 90 TABLET | Refills: 1 | Status: SHIPPED | OUTPATIENT
Start: 2021-01-28 | End: 2021-02-04 | Stop reason: SDUPTHER

## 2021-01-28 RX ORDER — DOXAZOSIN 2 MG/1
1 TABLET ORAL DAILY
COMMUNITY
Start: 2021-01-25 | End: 2021-10-20 | Stop reason: ALTCHOICE

## 2021-01-28 RX ORDER — AMIODARONE HYDROCHLORIDE 200 MG/1
200 TABLET ORAL DAILY
Qty: 30 TABLET | Refills: 3 | Status: SHIPPED | OUTPATIENT
Start: 2021-01-28 | End: 2021-07-02

## 2021-01-28 RX ORDER — LISINOPRIL 20 MG/1
20 TABLET ORAL DAILY
Qty: 90 TABLET | Refills: 3 | Status: SHIPPED | OUTPATIENT
Start: 2021-01-28 | End: 2022-02-23 | Stop reason: SDUPTHER

## 2021-02-01 ENCOUNTER — TELEPHONE (OUTPATIENT)
Dept: CARDIOLOGY CLINIC | Age: 76
End: 2021-02-01

## 2021-02-01 NOTE — TELEPHONE ENCOUNTER
Mary Kay Dean RN from [facility] home care called in this morning stating she can draw the labs that are ordered for Radha Rodgers and will fax them over. Mary Kay Dean can be reached at [phone].

## 2021-02-04 RX ORDER — NITROGLYCERIN 0.4 MG/1
0.4 TABLET SUBLINGUAL EVERY 5 MIN PRN
Qty: 25 TABLET | Refills: 3 | Status: SHIPPED | OUTPATIENT
Start: 2021-02-04 | End: 2022-10-10

## 2021-02-04 RX ORDER — ROSUVASTATIN CALCIUM 20 MG/1
20 TABLET, COATED ORAL DAILY
Qty: 90 TABLET | Refills: 1 | Status: SHIPPED | OUTPATIENT
Start: 2021-02-04 | End: 2021-07-14

## 2021-02-11 RX ORDER — METOPROLOL SUCCINATE 50 MG/1
TABLET, EXTENDED RELEASE ORAL
Qty: 90 TABLET | Refills: 3 | Status: SHIPPED | OUTPATIENT
Start: 2021-02-11 | End: 2022-09-22 | Stop reason: ALTCHOICE

## 2021-02-14 PROBLEM — R77.8 ELEVATED TROPONIN: Status: RESOLVED | Noted: 2021-01-15 | Resolved: 2021-02-14

## 2021-02-14 PROBLEM — R79.89 ELEVATED TROPONIN: Status: RESOLVED | Noted: 2021-01-15 | Resolved: 2021-02-14

## 2021-02-17 RX ORDER — APIXABAN 5 MG/1
TABLET, FILM COATED ORAL
Qty: 180 TABLET | Refills: 3 | Status: SHIPPED | OUTPATIENT
Start: 2021-02-17 | End: 2022-07-05

## 2021-04-12 ENCOUNTER — TELEPHONE (OUTPATIENT)
Dept: CARDIOLOGY CLINIC | Age: 76
End: 2021-04-12

## 2021-04-12 NOTE — TELEPHONE ENCOUNTER
Yes, continue Eliquis 5mg twice a day. Is pt having any bleeding issues or black tarry stools? If not, continue Eliquis. Thanks.  t

## 2021-04-12 NOTE — TELEPHONE ENCOUNTER
Patient wife called and wanted to make sure that Jere Alpha should continue taking Eliquis 5mg 2 times daily even when the medication runs out  Wife elmo [phone]    OV: 3/13/21  Next OV: 4/22/21  Labs: 2/1/21  Last EKG (if needed):1/15/21

## 2021-04-16 ENCOUNTER — TELEPHONE (OUTPATIENT)
Dept: CARDIOLOGY CLINIC | Age: 76
End: 2021-04-16

## 2021-04-16 NOTE — TELEPHONE ENCOUNTER
Please call patient with results. Kidney function is abnormal but stable. Follow-up as scheduled. Does patient see nephrology? If not established, please refer to nephrology if he is agreeable. Thanks.

## 2021-04-16 NOTE — TELEPHONE ENCOUNTER
He should follow up with his primary care physician in regards to the occasional rectal bleeding. Could possibly be related to hemorrhoids, but would defer that to the primary care physician. He should remain on the Eliquis.

## 2021-04-22 ENCOUNTER — OFFICE VISIT (OUTPATIENT)
Dept: CARDIOLOGY CLINIC | Age: 76
End: 2021-04-22
Payer: MEDICARE

## 2021-04-22 VITALS
BODY MASS INDEX: 29.33 KG/M2 | OXYGEN SATURATION: 97 % | TEMPERATURE: 96.9 F | HEIGHT: 69 IN | SYSTOLIC BLOOD PRESSURE: 120 MMHG | HEART RATE: 80 BPM | WEIGHT: 198 LBS | DIASTOLIC BLOOD PRESSURE: 72 MMHG

## 2021-04-22 DIAGNOSIS — I25.10 CORONARY ARTERY DISEASE INVOLVING NATIVE CORONARY ARTERY OF NATIVE HEART WITHOUT ANGINA PECTORIS: Primary | ICD-10-CM

## 2021-04-22 DIAGNOSIS — N18.32 STAGE 3B CHRONIC KIDNEY DISEASE (HCC): ICD-10-CM

## 2021-04-22 DIAGNOSIS — I10 ESSENTIAL HYPERTENSION: ICD-10-CM

## 2021-04-22 DIAGNOSIS — I48.0 PAF (PAROXYSMAL ATRIAL FIBRILLATION) (HCC): ICD-10-CM

## 2021-04-22 DIAGNOSIS — I73.9 PAD (PERIPHERAL ARTERY DISEASE) (HCC): ICD-10-CM

## 2021-04-22 PROBLEM — I50.32 CHRONIC DIASTOLIC CONGESTIVE HEART FAILURE (HCC): Status: ACTIVE | Noted: 2021-04-22

## 2021-04-22 PROCEDURE — G8427 DOCREV CUR MEDS BY ELIG CLIN: HCPCS | Performed by: INTERNAL MEDICINE

## 2021-04-22 PROCEDURE — 99214 OFFICE O/P EST MOD 30 MIN: CPT | Performed by: INTERNAL MEDICINE

## 2021-04-22 PROCEDURE — 4040F PNEUMOC VAC/ADMIN/RCVD: CPT | Performed by: INTERNAL MEDICINE

## 2021-04-22 PROCEDURE — 1036F TOBACCO NON-USER: CPT | Performed by: INTERNAL MEDICINE

## 2021-04-22 PROCEDURE — G8417 CALC BMI ABV UP PARAM F/U: HCPCS | Performed by: INTERNAL MEDICINE

## 2021-04-22 PROCEDURE — 1123F ACP DISCUSS/DSCN MKR DOCD: CPT | Performed by: INTERNAL MEDICINE

## 2021-04-22 NOTE — PATIENT INSTRUCTIONS

## 2021-04-22 NOTE — PROGRESS NOTES
[facility]      Cardiology Consult    Aramis Bowling  1945 April 22, 2021    Reason for Visit: CAD, PAF    CC: \"No problems, but I had a TIA. \"     HPI:  The patient is 66 y.o. male with a past medical history significant for CAD s/p PCI, HTN, and DM who presents for management of CAD. He was hospitalized 5/1-5/3/2016 after presenting to [facility] with left arm pain and chest pressure and was found to have acute inferior wall MI. He was transferred to [facility] for urgent cardiac cath which revealed a 100% mid Lcx stenosis that was successfully intervened with DENA placed. He has residual disease in his LAD, diagnoal & distal RCA. He was admitted to [facility] 2/2017 with atypical chest pains. He ruled out for an MI and underwent stress testing which did not show any reversible defects. He was seen by Dr. Albina Merlos in outpatient follow up and underwent PCI of the first PDA, diagonal branch and FFR of the lesions in the left circumflex artery and LAD. He was admitted 1/16/21 to [facility] from [facility] with slurred speech, confusion, and syncope. His MRI showed nothing acute but multiple old infarcts. He was seen by Dr. Kelsey Roa for elevated troponin which did not require any further inpatient cardiac work up. While on telemetry, a short run of Afib was noted and he was started on Amiodarone and Eliquis 5 mg bid. His BP was markedly elevated during the hospitalization and Dr. Kelsey Rao adjusted medications. Today, he states overall he is doing well. He denies any recurrent TIA or stroke like symptoms. He denies any new cardiac complaints and states he continues to remain active without any exertional symptoms. He denies any chest pains or worsening shortness of breath. He reports compliance with his medications and tolerating. He denies any abnormal bleeding or bruising.  Patient denies exertional chest pain/pressure, dyspnea at rest, worsening MERCEDES, PND, orthopnea, palpitations, lightheadedness, weight changes, changes in LE edema, and syncope. Past Medical History:   Diagnosis Date    Asthma     as a child-not current    CAD (coronary artery disease)     CKD (chronic kidney disease)     [facility]     Diabetes mellitus ([facility]    Donna CALVILLO (hard of hearing)     wears bilat hearing aids    HTN (hypertension)     Hyperlipidemia     Melanoma ([facility]     back    PAF (paroxysmal atrial fibrillation) ([facility]     [facility]     Syncope 01/15/2021     Past Surgical History:   Procedure Laterality Date    CORONARY ANGIOPLASTY WITH STENT PLACEMENT  2016    DENA to mid circ    CYSTOSCOPY  2018    cysto, [facility]    HERNIA REPAIR Bilateral distant past    SHOULDER SURGERY Bilateral 10-12 years ago     History reviewed. No pertinent family history.   Social History     Tobacco Use    Smoking status: Former Smoker     Types: Cigarettes     Quit date: 1986     Years since quittin.0    Smokeless tobacco: Never Used   Substance Use Topics    Alcohol use: No     Alcohol/week: 0.0 standard drinks    Drug use: No     Allergies   Allergen Reactions    Doxycycline Monohydrate      Unsure of reaction    Tetracyclines & Related Other (See Comments)     welts    Bactrim [Sulfamethoxazole-Trimethoprim] Rash     Current Outpatient Medications   Medication Sig Dispense Refill    ELIQUIS 5 MG TABS tablet TAKE 1 TABLET BY MOUTH TWICE DAILY 180 tablet 3    metoprolol succinate (TOPROL XL) 50 MG extended release tablet TAKE ONE TABLET BY MOUTH DAILY 90 tablet 3    rosuvastatin (CRESTOR) 20 MG tablet Take 1 tablet by mouth daily 90 tablet 1    nitroGLYCERIN (NITROSTAT) 0.4 MG SL tablet Place 1 tablet under the tongue every 5 minutes as needed for Chest pain 25 tablet 3    doxazosin (CARDURA) 2 MG tablet 1 tablet daily      lisinopril (PRINIVIL;ZESTRIL) 20 MG tablet Take 1 tablet by mouth daily 90 tablet 3    amiodarone (CORDARONE) 200 MG tablet Take 1 tablet by mouth daily 30 tablet 3  clopidogrel (PLAVIX) 75 MG tablet Take 1 tablet by mouth daily . Loading dose of 300 mg (4 tablets) on 1st day only. 30 tablet 11    insulin lispro (HUMALOG) 100 UNIT/ML injection vial Inject into the skin 3 times daily (before meals) 12 units with breakfast  14 units with lunch  16 units with dinner      loratadine (CLARITIN) 10 MG tablet Take 10 mg by mouth daily [employer] brand      insulin glargine (LANTUS) 100 UNIT/ML injection vial Inject 32 Units into the skin 2 times daily        No current facility-administered medications for this visit. Review of Systems:  · Constitutional: no unanticipated weight loss. There's been no change in sleep pattern, or activity level. No fevers, chills. Fatigue. · Eyes: No visual changes or diplopia. No scleral icterus. · ENT: No Headaches, hearing loss or vertigo. No mouth sores or sore throat. · Cardiovascular: as reviewed in HPI  · Respiratory: No cough or wheezing, no sputum production. No hematemesis. · Gastrointestinal: No abdominal pain, appetite loss, blood in stools. No change in bowel or bladder habits. · Genitourinary: No dysuria, trouble voiding, or hematuria. · Musculoskeletal:  No gait disturbance, no joint complaints. Muscle weakness bilateral legs. · Integumentary: No rash or pruritis. · Neurological: No headache, diplopia, change in muscle strength, numbness or tingling. · Psychiatric: No anxiety or depression. · Endocrine: No temperature intolerance. No excessive thirst, fluid intake, or urination. No tremor. · Hematologic/Lymphatic: No abnormal bruising or bleeding, blood clots or swollen lymph nodes. · Allergic/Immunologic: No nasal congestion or hives.     Physical Exam:   /72 (Site: Right Upper Arm, Position: Sitting, Cuff Size: Medium Adult)   Pulse 80   Temp 96.9 °F (36.1 °C)   Ht 5' 9\" (1.753 m)   Wt 198 lb (89.8 kg)   SpO2 97%   BMI 29.24 kg/m²   Wt Readings from Last 3 Encounters:   04/22/21 198 lb (89.8 kg) 01/28/21 193 lb 12.8 oz (87.9 kg)   01/16/21 190 lb 11.2 oz (86.5 kg)     Constitutional: He is oriented to person, place, and time. He appears well-developed and well-nourished. In no acute distress. Head: Normocephalic and atraumatic. Pupils equal and round. Neck: Neck supple. No JVP or carotid bruit appreciated. No mass and no thyromegaly present. No lymphadenopathy present. Cardiovascular: Normal rate. Normal heart sounds. Exam reveals no gallop and no friction rub. No murmur heard. Pulmonary/Chest: Effort normal and breath sounds normal. No respiratory distress. He has no wheezes, rhonchi or rales. Abdominal: Soft, non-tender. Bowel sounds are normal. He exhibits no organomegaly, mass or bruit. Extremities: 1+ BLE edema. No cyanosis or clubbing. Pulses are 2+ radial/dorsalis pedis/posterior tibial/carotid bilaterally. Neurological: No gross cranial nerve deficit. Coordination normal.   Skin: Skin is warm and dry. There is no rash or diaphoresis. Psychiatric: He has a normal mood and affect. His speech is normal and behavior is normal.     Lab Review:   FLP:  2/2017 BUN30, Cr 1.7  FLP 9/2018 BUN 34, Cr 1.5. K 4.7. Lab Results   Component Value Date    TRIG 54 01/15/2021    HDL 44 01/15/2021    LDLCALC 49 01/15/2021    LABVLDL 11 01/15/2021     BUN/Creatinine:  9/2018 , Trig 115, LDL 63, HDL 62  Lab Results   Component Value Date    BUN 28 01/16/2021    CREATININE 1.5 01/16/2021     EKG Interpretation: 10/7/16 Sinus rhythm. Nonspecific T-waves abnormality. 1/15/21 Normal sinus rhythm, T wave abnormality, consider lateral ischemia. [facility] 5/1/2016:  1. Left main trunk: It arises from the left sinus of Valsalva. It divides into left anterior descending artery and left circumflex artery. The left main trunk is free of atherosclerosis. 2.  Left anterior descending artery:   It is a moderate-sized artery and it descends into the intraventricular sulcus and wraps around the apex of the heart.  The left main trunk is free of atherosclerosis. 3.  Left anterior descending artery: It is a moderate-sized artery. It descends into the intraventricular sulcus and wraps around the apex of the heart. The left anterior descending artery has about 50% stenosis in the mid to distal portion. There is also a 50% stenosis of the diagonal branch in the proximal segment. The lumen of the left anterior descending artery is free of any significant atherosclerosis. 4.  Left circumflex artery: It arises from the left main trunk. It is 100% occluded after a 1st obtuse marginal branch. The right coronary artery arises from right sinus on Valsalva. It is a dominant artery. It gives rise to a posterior descending and posterolateral branches. Right coronary artery has evidence of about 90% to 95% stenosis in the mid to distal portion of the PDA. There is also about 50% to 60% stenosis of the 2nd PDA in the midsegment, but it does not appear hemodynamically significant. 5.  Left ventriculogram reveals overall preserved left ventricular systolic function. Successful angioplasty followed by stent deployment in the mid circumflex artery using a drug-eluting stent, 100% stenosis reduced to 0% using 3.25 by 18 Alpine stent. Final diameter was 3.34 with 0% residual stenosis. SAMEER grade-3 flow was established. There appears to be a 40% to 50% stenosis of the distal circumflex branch beyond the area of the stenosis. There appears to be also a microembolization in a small distal circumflex branch. Echo 5/2/2016:  Left ventricle size is normal.  Mild concentric left ventricular hypertrophy is present. Ejection fraction is visually estimated to be 50-55 %. There is reversal of E/A inflow velocities across the mitral valve suggesting impaired left ventricular relaxation. The left atrium is dilated. Myoview 2/22/17 [facility] report)  Fixed defect at the \"superior aspect of the inferolateral wall. \"  EF 52%.    Cath 12/2019  Previous stent in the mid left circumflex artery is widely patent. Beyond the stent there is moderate disease with 60% stenosis. LAD-mid LAD has moderate disease. First diagonal branch has 90% segmental stenosis. RCA PDA1 has 90% stenosis. PDA2 has 80% stenosis. PCI of the first PDA, diagonal branch and FFR of the lesions in the left circumflex artery and LAD. CT 1/14/21  Occluded left posterior cerebral artery, there is collerateralization to the distal circulation. 70% or greater right carotid artery, bilateral vertebral artery stenosis, 80-90% on the left and up to 50% on the right. Echo 1/15/21  There is concentric left ventricular hypertrophy. Ejection fraction is visually estimated to be 41-36%  Grade I diastolic dysfunction with normal LV filling pressures. Normal right ventricular size and function. Assessment and Plan:  1. CAD s/p STEMI (2016) and DENA Lcx (2016), PDA and diag (2019). Asymptomatic. Continue with medical management and risk factor modification including Plavix, statin, and B-blocker. 2. Paroxysmal atrial fibrillation. Asymptomatic. CHADS-Vasc is at least 7 (HTN, age, DM, CVA, CAD). Treatment options for atrial fibrillation discussed including rate control, anticoagulation options, antiarrhythmics, and cardioversion. Continue Eliquis 5 mg bid and amiodarone 200 mg daily. Will continue with routine lab work while on chronic amiodarone therapy. 3. PAD (L-subclavian stenosis)/carotid stenosis (70% R-ICA)/Occluded L-PCA. Continue anticoagulation and antiplatelet therapy. 4. Essential hypertension. Controlled. Goal BP <130/80. Continue medical therapy. Previously discontinued Nifedipine and aldactone with reports of hypotension. Encouraged to monitor blood pressure at home. 5. CKD. Stage IIIb. Creatinine remains stable at 1.8.     6. TIA. MRI showed nothing acute. Continue anticoagulation and antiplatelet therapy. Follow up in 6 months. Thank you very much for allowing me to participate in the care of your patient. Please do not hesitate to contact me if you have any questions. Sincerely,  Marlen Nicole. Shaheen Rojas, [address] [place] [address]  Ph: [phone]  Fax: [phone]    This note was scribed in the presence of Dr Shaheen Rojas MD by David Grimaldo RN. Physician Attestation: The scribes documentation has been prepared under my direction and personally reviewed by me in its entirety. I confirm that the note above accurately reflects all work, treatment, procedures, and medical decision making performed by me. All portions of the note including but not limited to the chief complaint, history of present illness, physical exam, assessment and plan/medical decision making were personally reviewed, edited, and updated on the day of the visit.

## 2021-05-10 RX ORDER — CLOPIDOGREL BISULFATE 75 MG/1
75 TABLET ORAL DAILY
Qty: 30 TABLET | Refills: 11 | Status: SHIPPED | OUTPATIENT
Start: 2021-05-10 | End: 2022-01-13

## 2021-05-10 NOTE — TELEPHONE ENCOUNTER
Pt sates if he still needs to be on PLAVIX, he needs a refill please. Medication Refill    Medication needing refilled: PLAVIX    Dosage of the medication:75mg    How are you taking this medication (QD, BID, TID, QID, PRN):Take 1 tablet by mouth daily . Loading dose of 300 mg (4 tablets) on 1st day only.     30 or 90 day supply called in:30    When will you run out of your medication:now    Which Pharmacy are we sending the medication to?:[facility] [place], [address] [place] [address] #  [phone]

## 2021-07-02 RX ORDER — AMIODARONE HYDROCHLORIDE 200 MG/1
TABLET ORAL
Qty: 30 TABLET | Refills: 5 | Status: SHIPPED | OUTPATIENT
Start: 2021-07-02 | End: 2021-12-27

## 2021-07-02 NOTE — TELEPHONE ENCOUNTER
Last OV: 04/22/2024  Next OV: 10/28/2021  Most recent Labs: CMP 01/16/2021  TSH----  Last PT/INR (if needed):  Last EKG (if needed):01/15/2021

## 2021-07-14 RX ORDER — ROSUVASTATIN CALCIUM 20 MG/1
TABLET, COATED ORAL
Qty: 90 TABLET | Refills: 1 | Status: SHIPPED | OUTPATIENT
Start: 2021-07-14 | End: 2022-01-10

## 2021-07-14 NOTE — TELEPHONE ENCOUNTER
Last OV: 05/10/2021  Next OV: 10/28/2021  Last refill:02/04/2021-#90 w/ 1 refill  Most recent Labs: cmp 01/16/2021, lipids 01/15/2021  Last PT/INR (if needed):  Last EKG (if needed):01/15/2021

## 2021-10-11 PROBLEM — N18.32 STAGE 3B CHRONIC KIDNEY DISEASE (HCC): Status: ACTIVE | Noted: 2021-10-11

## 2021-10-11 PROBLEM — N18.31 STAGE 3A CHRONIC KIDNEY DISEASE (HCC): Status: ACTIVE | Noted: 2021-10-11

## 2021-10-11 PROBLEM — I48.0 PAF (PAROXYSMAL ATRIAL FIBRILLATION) (HCC): Status: ACTIVE | Noted: 2021-10-11

## 2021-10-11 PROBLEM — I65.21 CAROTID STENOSIS, RIGHT: Status: ACTIVE | Noted: 2021-10-11

## 2021-10-11 PROBLEM — I73.9 PAD (PERIPHERAL ARTERY DISEASE) (HCC): Status: ACTIVE | Noted: 2021-10-11

## 2021-12-27 RX ORDER — AMIODARONE HYDROCHLORIDE 200 MG/1
TABLET ORAL
Qty: 90 TABLET | Refills: 0 | Status: SHIPPED | OUTPATIENT
Start: 2021-12-27 | End: 2022-09-20 | Stop reason: SDUPTHER

## 2022-01-10 RX ORDER — ROSUVASTATIN CALCIUM 20 MG/1
TABLET, COATED ORAL
Qty: 90 TABLET | Refills: 1 | Status: SHIPPED | OUTPATIENT
Start: 2022-01-10 | End: 2022-07-13

## 2022-01-10 NOTE — PROGRESS NOTES
[facility]      Cardiology Consult    Onel Andrade  1945 January 13, 2022    Reason for Visit: CAD, PAF    CC: \"rectal bleeding\"     HPI:  The patient is 64 y.o. male with a past medical history significant for CAD s/p PCI, HTN, and DM who presents for management of CAD. He was hospitalized 5/1-5/3/2016 after presenting to [facility] with left arm pain and chest pressure and was found to have acute inferior wall MI. He was transferred to [facility] for urgent cardiac cath which revealed a 100% mid Lcx stenosis that was successfully intervened with DENA placed. He has residual disease in his LAD, diagnoal & distal RCA. He was admitted to [facility] 2/2017 with atypical chest pains. He ruled out for an MI and underwent stress testing which did not show any reversible defects. He was seen by Dr. Lenard Jeffery in outpatient follow up and underwent PCI of the first PDA, diagonal branch and FFR of the lesions in the left circumflex artery and LAD. He was admitted 1/16/21 to [facility] from [facility] with slurred speech, confusion, and syncope. His MRI showed nothing acute but multiple old infarcts. He was seen by Dr. Brianda Zelaya for elevated troponin which did not require any further inpatient cardiac work up. While on telemetry, a short run of Afib was noted and he was started on Amiodarone and Eliquis 5 mg bid. His BP was markedly elevated during the hospitalization and Dr. Brianda Zelaya adjusted medications. Today, he states that he is doing fairly well. He states that Eliquis is causing him to have bright red rectal bleeding. Holding his Eliquis for a couple days improves his rectal bleeding but he continues to experience rectal bleeding about once monthly. He states that he did follow up with GI and workup was unremarkable. He is concerned about the cost of Eliquis and did not mail his information as requested to patient assistance. He denies any recurrent TIA or stroke like symptoms.  He denies any chest pains or worsening shortness of breath. He reports compliance with his medications. Patient denies exertional chest pain/pressure, dyspnea at rest, worsening MERCEDES, PND, orthopnea, palpitations, lightheadedness, weight changes, changes in LE edema, and syncope. Past Medical History:   Diagnosis Date    Asthma     as a child-not current    CAD (coronary artery disease)     Carotid stenosis, right 10/11/2021    Chronic diastolic congestive heart failure ([facility] 2021    CKD (chronic kidney disease)     CVA (cerebral vascular accident) ([facility]     Diabetes mellitus ([address]    Murrel Wilber [place] (hard of hearing)     wears bilat hearing aids    HTN (hypertension)     Hyperlipidemia     Melanoma ([facility]     back    PAF (paroxysmal atrial fibrillation) ([facility]     Prostate CA ([facility]     Syncope 01/15/2021     Past Surgical History:   Procedure Laterality Date    CORONARY ANGIOPLASTY WITH STENT PLACEMENT  2016    DENA to mid circ    CYSTOSCOPY  2018    cysto, [facility]    HERNIA REPAIR Bilateral distant past    SHOULDER SURGERY Bilateral 10-12 years ago     History reviewed. No pertinent family history. Social History     Tobacco Use    Smoking status: Former Smoker     Types: Cigarettes     Quit date: 1986     Years since quittin.7    Smokeless tobacco: Never Used   Vaping Use    Vaping Use: Never used   Substance Use Topics    Alcohol use: No     Alcohol/week: 0.0 standard drinks    Drug use: No     Allergies   Allergen Reactions    Doxycycline Monohydrate      Unsure of reaction    Tetracyclines & Related Other (See Comments)     welts    Bactrim [Sulfamethoxazole-Trimethoprim] Rash     Current Outpatient Medications   Medication Sig Dispense Refill    amiodarone (CORDARONE) 200 MG tablet TAKE ONE TABLET BY MOUTH DAILY 90 tablet 0    amLODIPine (NORVASC) 2.5 MG tablet Take 1 tablet by mouth daily 90 tablet 1    clopidogrel (PLAVIX) 75 MG tablet Take 1 tablet by mouth daily .  Loading dose of 300 mg (4 tablets) on 1st day only. 30 tablet 11    ELIQUIS 5 MG TABS tablet TAKE 1 TABLET BY MOUTH TWICE DAILY 180 tablet 3    metoprolol succinate (TOPROL XL) 50 MG extended release tablet TAKE ONE TABLET BY MOUTH DAILY 90 tablet 3    lisinopril (PRINIVIL;ZESTRIL) 20 MG tablet Take 1 tablet by mouth daily 90 tablet 3    insulin lispro (HUMALOG) 100 UNIT/ML injection vial Inject into the skin 3 times daily (before meals) 12 units with breakfast  14 units with lunch  16 units with dinner      loratadine (CLARITIN) 10 MG tablet Take 10 mg by mouth daily [employer]      insulin glargine (LANTUS) 100 UNIT/ML injection vial Inject 32 Units into the skin 2 times daily       rosuvastatin (CRESTOR) 20 MG tablet TAKE ONE TABLET BY MOUTH DAILY 90 tablet 1    nitroGLYCERIN (NITROSTAT) 0.4 MG SL tablet Place 1 tablet under the tongue every 5 minutes as needed for Chest pain 25 tablet 3     No current facility-administered medications for this visit. Review of Systems:  · Constitutional: no unanticipated weight loss. There's been no change in sleep pattern, or activity level. No fevers, chills. Fatigue. · Eyes: No visual changes or diplopia. No scleral icterus. · ENT: No Headaches, hearing loss or vertigo. No mouth sores or sore throat. · Cardiovascular: as reviewed in HPI  · Respiratory: No cough or wheezing, no sputum production. No hematemesis. · Gastrointestinal: No abdominal pain, appetite loss, blood in stools. No change in bowel or bladder habits. · Genitourinary: No dysuria, trouble voiding, or hematuria. · Musculoskeletal:  No gait disturbance, no joint complaints. Muscle weakness bilateral legs. · Integumentary: No rash or pruritis. · Neurological: No headache, diplopia, change in muscle strength, numbness or tingling. · Psychiatric: No anxiety or depression. · Endocrine: No temperature intolerance. No excessive thirst, fluid intake, or urination. No tremor.   · Hematologic/Lymphatic: No abnormal bruising or bleeding, blood clots or swollen lymph nodes. · Allergic/Immunologic: No nasal congestion or hives. Physical Exam:   /78   Pulse 65   Ht 5' 9\" (1.753 m)   Wt 189 lb (85.7 kg)   SpO2 97%   BMI 27.91 kg/m²   Wt Readings from Last 3 Encounters:   01/13/22 189 lb (85.7 kg)   10/20/21 191 lb 1.6 oz (86.7 kg)   07/14/21 197 lb 4.8 oz (89.5 kg)   Constitutional: He is oriented to person, place, and time. He appears well-developed and well-nourished. In no acute distress. Head: Normocephalic and atraumatic. Pupils equal and round. Neck: Neck supple. No JVP or carotid bruit appreciated. No mass and no thyromegaly present. No lymphadenopathy present. Cardiovascular: Normal rate. Normal heart sounds. Exam reveals no gallop and no friction rub. No murmur heard. Pulmonary/Chest: Effort normal and breath sounds normal. No respiratory distress. He has no wheezes, rhonchi or rales. Abdominal: Soft, non-tender. Bowel sounds are normal. He exhibits no organomegaly, mass or bruit. Extremities: Trace BLE edema. No cyanosis or clubbing. Pulses are 2+ radial/dorsalis pedis/posterior tibial/carotid bilaterally. Neurological: No gross cranial nerve deficit. Coordination normal.   Skin: Skin is warm and dry. There is no rash or diaphoresis. Psychiatric: He has a normal mood and affect. His speech is normal and behavior is normal.     Lab Review:   FLP:  2/2017 BUN30, Cr 1.7  FLP 9/2018 , Trig 115, LDL 63, HDL 62  FLP: 1/2022 TG 91, HDL 57, LDL 53.  Lab Results   Component Value Date    TRIG 54 01/15/2021    HDL 44 01/15/2021    LDLCALC 49 01/15/2021    LABVLDL 11 01/15/2021     BUN/Creatinine:   Lab Results   Component Value Date    BUN 28 01/16/2021    CREATININE 1.5 01/16/2021 9/2018 BUN 34, Cr 1.5. K 4.7.   1/2022 BUN 30, Cr 2.43. K 5.1. TSH 2.280. LFT's normal     EKG Interpretation: 10/7/16 Sinus rhythm. Nonspecific T-waves abnormality.    1/15/21 Normal sinus rhythm, T wave abnormality, consider lateral ischemia. 1/13/22 Sinus  Rhythm. First degree A-V block. Poor R-wave progression. Nonspecific T-abnormality. [facility] 5/1/2016:  1. Left main trunk: It arises from the left sinus of Valsalva. It divides into left anterior descending artery and left circumflex artery. The left main trunk is free of atherosclerosis. 2.  Left anterior descending artery: It is a moderate-sized artery and it descends into the intraventricular sulcus and wraps around the apex of the heart. The left main trunk is free of atherosclerosis. 3.  Left anterior descending artery: It is a moderate-sized artery. It descends into the intraventricular sulcus and wraps around the apex of the heart. The left anterior descending artery has about 50% stenosis in the mid to distal portion. There is also a 50% stenosis of the diagonal branch in the proximal segment. The lumen of the left anterior descending artery is free of any significant atherosclerosis. 4.  Left circumflex artery: It arises from the left main trunk. It is 100% occluded after a 1st obtuse marginal branch. The right coronary artery arises from right sinus on Valsalva. It is a dominant artery. It gives rise to a posterior descending and posterolateral branches. Right coronary artery has evidence of about 90% to 95% stenosis in the mid to distal portion of the PDA. There is also about 50% to 60% stenosis of the 2nd PDA in the midsegment, but it does not appear hemodynamically significant. 5.  Left ventriculogram reveals overall preserved left ventricular systolic function. Successful angioplasty followed by stent deployment in the mid circumflex artery using a drug-eluting stent, 100% stenosis reduced to 0% using 3.25 by 18 Alpine stent. Final diameter was 3.34 with 0% residual stenosis. SAMEER grade-3 flow was established. There appears to be a 40% to 50% stenosis of the distal circumflex branch beyond the area of the stenosis.   There appears to be also a microembolization in a small distal circumflex branch. Echo 5/2/2016:  Left ventricle size is normal.  Mild concentric left ventricular hypertrophy is present. Ejection fraction is visually estimated to be 50-55 %. There is reversal of E/A inflow velocities across the mitral valve suggesting impaired left ventricular relaxation. The left atrium is dilated. Myoview 2/22/17 [facility] report)  Fixed defect at the \"superior aspect of the inferolateral wall. \"  EF 52%. Cath 12/2019  Previous stent in the mid left circumflex artery is widely patent. Beyond the stent there is moderate disease with 60% stenosis. LAD-mid LAD has moderate disease. First diagonal branch has 90% segmental stenosis. RCA PDA1 has 90% stenosis. PDA2 has 80% stenosis. PCI of the first PDA, diagonal branch and FFR of the lesions in the left circumflex artery and LAD. CT 1/14/21  Occluded left posterior cerebral artery, there is collerateralization to the distal circulation. 70% or greater right carotid artery, bilateral vertebral artery stenosis, 80-90% on the left and up to 50% on the right. Echo 1/15/21  There is concentric left ventricular hypertrophy. Ejection fraction is visually estimated to be 15-71%  Grade I diastolic dysfunction with normal LV filling pressures. Normal right ventricular size and function. Assessment and Plan:  1. CAD s/p STEMI (2016) and DENA Lcx (2016), PDA and diag (2019). Asymptomatic. Continue with medical management and risk factor modification including statin and B-blocker. Pt currently states that he is not taking plavix and is no longer taking ASA. Advised to call with current medications. With reports of rectal bleeding, will check CBC today. Recommend resuming ASA 81mg daily if not significantly anemic or actively bleeding. Will request records from Dr. Wally Whalen for review. 2. Paroxysmal atrial fibrillation. Asymptomatic. CHADS-Vasc is at least 7 (HTN, age, DM, CVA, CAD). Treatment options for atrial fibrillation discussed including rate control, risks and anticoagulation options, antiarrhythmics, watchman device and cardioversion. Continue Eliquis 5 mg bid for now and amiodarone 200 mg daily. Will continue with routine lab work while on chronic amiodarone therapy. With reports of rectal bleeding will check CBC today. Will attempt to get pt assistance for Eliquis. Consider Watchman device in future. 3. PAD (L-subclavian stenosis)/carotid stenosis (70% R-ICA)/Occluded L-PCA. Continue anticoagulation at this time. 4. Essential hypertension. Controlled. Goal BP <130/80. Continue medical therapy. Previously discontinued Nifedipine and aldactone with reports of hypotension. Encouraged to monitor blood pressure at home. 5. CKD. Stage IIIb. Creatinine 2.43 1/2022    6. TIA. MRI showed nothing acute. Continue anticoagulation. 7. Rectal bleeding. GI evaluation by Dr. Sd hawley per pt. Will attempt to obtain records for review. Will check CBC today and make further recommendations. 8. HLD. LDL goal<70. LDL 53 1/2022. Continue crestor 20mg daily. Follow up in 6 months    Thank you very much for allowing me to participate in the care of your patient. Please do not hesitate to contact me if you have any questions. Sincerely,  Paola Whitten. [facility], [address] [place] [address]  Ph: [phone]  Fax: [phone]    This note was scribed in the presence of the physician by Sangeetha Vegas RN. Physician Attestation: The scribes documentation has been prepared under my direction and personally reviewed by me in its entirety. I confirm that the note above accurately reflects all work, treatment, procedures, and medical decision making performed by me.    All portions of the note including but not limited to the chief complaint, history of present illness, physical exam, assessment and plan/medical decision making were personally reviewed, edited, and updated on the day of the visit.

## 2022-01-13 ENCOUNTER — TELEPHONE (OUTPATIENT)
Dept: CARDIOLOGY CLINIC | Age: 77
End: 2022-01-13

## 2022-01-13 ENCOUNTER — OFFICE VISIT (OUTPATIENT)
Dept: CARDIOLOGY CLINIC | Age: 77
End: 2022-01-13
Payer: MEDICARE

## 2022-01-13 VITALS
HEART RATE: 65 BPM | SYSTOLIC BLOOD PRESSURE: 128 MMHG | OXYGEN SATURATION: 97 % | DIASTOLIC BLOOD PRESSURE: 78 MMHG | BODY MASS INDEX: 27.99 KG/M2 | WEIGHT: 189 LBS | HEIGHT: 69 IN

## 2022-01-13 DIAGNOSIS — I73.9 PAD (PERIPHERAL ARTERY DISEASE) (HCC): ICD-10-CM

## 2022-01-13 DIAGNOSIS — I48.0 PAF (PAROXYSMAL ATRIAL FIBRILLATION) (HCC): ICD-10-CM

## 2022-01-13 DIAGNOSIS — I25.10 CORONARY ARTERY DISEASE INVOLVING NATIVE CORONARY ARTERY OF NATIVE HEART WITHOUT ANGINA PECTORIS: Primary | ICD-10-CM

## 2022-01-13 DIAGNOSIS — E78.00 PURE HYPERCHOLESTEROLEMIA: ICD-10-CM

## 2022-01-13 DIAGNOSIS — N18.32 STAGE 3B CHRONIC KIDNEY DISEASE (HCC): ICD-10-CM

## 2022-01-13 DIAGNOSIS — I10 ESSENTIAL HYPERTENSION: ICD-10-CM

## 2022-01-13 DIAGNOSIS — I25.2 HISTORY OF ST ELEVATION MYOCARDIAL INFARCTION (STEMI): ICD-10-CM

## 2022-01-13 DIAGNOSIS — K62.5 RECTAL BLEEDING: ICD-10-CM

## 2022-01-13 LAB
BASOPHILS ABSOLUTE: 0 K/UL (ref 0–0.2)
BASOPHILS RELATIVE PERCENT: 0.4 %
EOSINOPHILS ABSOLUTE: 0.3 K/UL (ref 0–0.6)
EOSINOPHILS RELATIVE PERCENT: 3.5 %
HCT VFR BLD CALC: 42.1 % (ref 40.5–52.5)
HEMOGLOBIN: 14.1 G/DL (ref 13.5–17.5)
LYMPHOCYTES ABSOLUTE: 1.4 K/UL (ref 1–5.1)
LYMPHOCYTES RELATIVE PERCENT: 18.2 %
MCH RBC QN AUTO: 30.7 PG (ref 26–34)
MCHC RBC AUTO-ENTMCNC: 33.5 G/DL (ref 31–36)
MCV RBC AUTO: 91.6 FL (ref 80–100)
MONOCYTES ABSOLUTE: 0.6 K/UL (ref 0–1.3)
MONOCYTES RELATIVE PERCENT: 7.5 %
NEUTROPHILS ABSOLUTE: 5.4 K/UL (ref 1.7–7.7)
NEUTROPHILS RELATIVE PERCENT: 70.4 %
PDW BLD-RTO: 14.2 % (ref 12.4–15.4)
PLATELET # BLD: 269 K/UL (ref 135–450)
PMV BLD AUTO: 7 FL (ref 5–10.5)
RBC # BLD: 4.59 M/UL (ref 4.2–5.9)
WBC # BLD: 7.6 K/UL (ref 4–11)

## 2022-01-13 PROCEDURE — G8484 FLU IMMUNIZE NO ADMIN: HCPCS | Performed by: INTERNAL MEDICINE

## 2022-01-13 PROCEDURE — 1036F TOBACCO NON-USER: CPT | Performed by: INTERNAL MEDICINE

## 2022-01-13 PROCEDURE — G8427 DOCREV CUR MEDS BY ELIG CLIN: HCPCS | Performed by: INTERNAL MEDICINE

## 2022-01-13 PROCEDURE — 4040F PNEUMOC VAC/ADMIN/RCVD: CPT | Performed by: INTERNAL MEDICINE

## 2022-01-13 PROCEDURE — 36415 COLL VENOUS BLD VENIPUNCTURE: CPT | Performed by: INTERNAL MEDICINE

## 2022-01-13 PROCEDURE — 93000 ELECTROCARDIOGRAM COMPLETE: CPT | Performed by: INTERNAL MEDICINE

## 2022-01-13 PROCEDURE — 99214 OFFICE O/P EST MOD 30 MIN: CPT | Performed by: INTERNAL MEDICINE

## 2022-01-13 PROCEDURE — 1123F ACP DISCUSS/DSCN MKR DOCD: CPT | Performed by: INTERNAL MEDICINE

## 2022-01-13 PROCEDURE — G8417 CALC BMI ABV UP PARAM F/U: HCPCS | Performed by: INTERNAL MEDICINE

## 2022-01-14 NOTE — TELEPHONE ENCOUNTER
The colonoscopy from 2020 reports the bleeding is from radiation proctopathy. His blood counts are normal. Suggest following up with Dr. Delonte Dixon to see if there a treatment so that he can take the Eliquis consistently.

## 2022-01-14 NOTE — TELEPHONE ENCOUNTER
Spoke to patient and made aware of results and recommendations. He states that he will follow up with Dr. Rod Sousa and call our office after his appointment for update.

## 2022-06-16 ENCOUNTER — TELEPHONE (OUTPATIENT)
Dept: CARDIOLOGY CLINIC | Age: 77
End: 2022-06-16

## 2022-06-16 ENCOUNTER — OFFICE VISIT (OUTPATIENT)
Dept: CARDIOLOGY CLINIC | Age: 77
End: 2022-06-16
Payer: MEDICARE

## 2022-06-16 VITALS
BODY MASS INDEX: 27.55 KG/M2 | OXYGEN SATURATION: 98 % | HEIGHT: 69 IN | SYSTOLIC BLOOD PRESSURE: 122 MMHG | WEIGHT: 186 LBS | DIASTOLIC BLOOD PRESSURE: 72 MMHG | HEART RATE: 80 BPM

## 2022-06-16 DIAGNOSIS — R42 LIGHTHEADEDNESS: Primary | ICD-10-CM

## 2022-06-16 DIAGNOSIS — I48.0 PAF (PAROXYSMAL ATRIAL FIBRILLATION) (HCC): ICD-10-CM

## 2022-06-16 DIAGNOSIS — N18.32 STAGE 3B CHRONIC KIDNEY DISEASE (HCC): ICD-10-CM

## 2022-06-16 DIAGNOSIS — I25.10 CORONARY ARTERY DISEASE INVOLVING NATIVE CORONARY ARTERY OF NATIVE HEART WITHOUT ANGINA PECTORIS: ICD-10-CM

## 2022-06-16 DIAGNOSIS — I73.9 PAD (PERIPHERAL ARTERY DISEASE) (HCC): ICD-10-CM

## 2022-06-16 DIAGNOSIS — I65.21 CAROTID STENOSIS, RIGHT: ICD-10-CM

## 2022-06-16 DIAGNOSIS — I10 ESSENTIAL HYPERTENSION: ICD-10-CM

## 2022-06-16 DIAGNOSIS — E78.00 PURE HYPERCHOLESTEROLEMIA: ICD-10-CM

## 2022-06-16 DIAGNOSIS — I25.2 HISTORY OF ST ELEVATION MYOCARDIAL INFARCTION (STEMI): ICD-10-CM

## 2022-06-16 PROCEDURE — G8428 CUR MEDS NOT DOCUMENT: HCPCS | Performed by: INTERNAL MEDICINE

## 2022-06-16 PROCEDURE — 1123F ACP DISCUSS/DSCN MKR DOCD: CPT | Performed by: INTERNAL MEDICINE

## 2022-06-16 PROCEDURE — G8417 CALC BMI ABV UP PARAM F/U: HCPCS | Performed by: INTERNAL MEDICINE

## 2022-06-16 PROCEDURE — 99214 OFFICE O/P EST MOD 30 MIN: CPT | Performed by: INTERNAL MEDICINE

## 2022-06-16 PROCEDURE — 93000 ELECTROCARDIOGRAM COMPLETE: CPT | Performed by: INTERNAL MEDICINE

## 2022-06-16 PROCEDURE — 1036F TOBACCO NON-USER: CPT | Performed by: INTERNAL MEDICINE

## 2022-06-16 NOTE — PATIENT INSTRUCTIONS
Patient Education        Atrial Fibrillation: Care Instructions  Your Care Instructions     Atrial fibrillation is an irregular and often fast heartbeat. Treating this condition is important for several reasons. It can cause blood clots, which can travel from your heart to your brain and cause a stroke. If you have a fast heartbeat, you may feel lightheaded, dizzy, and weak. An irregular heartbeatcan also increase your risk for heart failure. Atrial fibrillation is often the result of another heart condition, such as high blood pressure or coronary artery disease. Making changes to improve yourheart condition will help you stay healthy and active. Follow-up care is a key part of your treatment and safety. Be sure to make and go to all appointments, and call your doctor if you are having problems. It's also a good idea to know your test results and keep alist of the medicines you take. How can you care for yourself at home? Medicines     Take your medicines exactly as prescribed. Call your doctor if you think you are having a problem with your medicine. You will get more details on the specific medicines your doctor prescribes.      If your doctor has given you a blood thinner to prevent a stroke, be sure you get instructions about how to take your medicine safely. Blood thinners can cause serious bleeding problems.      Do not take any vitamins, over-the-counter drugs, or herbal products without talking to your doctor first.   Lifestyle changes     Do not smoke. Smoking can increase your chance of a stroke and heart attack. If you need help quitting, talk to your doctor about stop-smoking programs and medicines. These can increase your chances of quitting for good.      Eat a heart-healthy diet.      Stay at a healthy weight. Lose weight if you need to.      Limit alcohol to 2 drinks a day for men and 1 drink a day for women. Too much alcohol can cause health problems.      Avoid colds and flu.  Get a pneumococcal vaccine shot. If you have had one before, ask your doctor whether you need another dose. Get a flu shot every year. If you must be around people with colds or flu, wash your hands often. Activity     If your doctor recommends it, get more exercise. Walking is a good choice. Bit by bit, increase the amount you walk every day. Try for at least 30 minutes on most days of the week. You also may want to swim, bike, or do other activities. Your doctor may suggest that you join a cardiac rehabilitation program so that you can have help increasing your physical activity safely.      Start light exercise if your doctor says it is okay. Even a small amount will help you get stronger, have more energy, and manage stress. Walking is an easy way to get exercise. Start out by walking a little more than you did in the hospital. Gradually increase the amount you walk.      When you exercise, watch for signs that your heart is working too hard. You are pushing too hard if you cannot talk while you are exercising. If you become short of breath or dizzy or have chest pain, sit down and rest immediately.      Check your pulse regularly. Place two fingers on the artery at the palm side of your wrist, in line with your thumb. If your heartbeat seems uneven or fast, talk to your doctor. When should you call for help? Call 911 anytime you think you may need emergency care. For example, call if:     You have symptoms of a heart attack. These may include:  ? Chest pain or pressure, or a strange feeling in the chest.  ? Sweating. ? Shortness of breath. ? Nausea or vomiting. ? Pain, pressure, or a strange feeling in the back, neck, jaw, or upper belly or in one or both shoulders or arms. ? Lightheadedness or sudden weakness. ? A fast or irregular heartbeat. After you call 911, the  may tell you to chew 1 adult-strength or 2 to 4 low-dose aspirin. Wait for an ambulance.  Do not try to drive yourself.      You have symptoms of a stroke. These may include:  ? Sudden numbness, tingling, weakness, or loss of movement in your face, arm, or leg, especially on only one side of your body. ? Sudden vision changes. ? Sudden trouble speaking. ? Sudden confusion or trouble understanding simple statements. ? Sudden problems with walking or balance. ? A sudden, severe headache that is different from past headaches.      You passed out (lost consciousness). Call your doctor now or seek immediate medical care if:     You have new or increased shortness of breath.      You feel dizzy or lightheaded, or you feel like you may faint.      Your heart rate becomes irregular.      You can feel your heart flutter in your chest or skip heartbeats. Tell your doctor if these symptoms are new or worse. Watch closely for changes in your health, and be sure to contact your doctor ifyou have any problems. Where can you learn more? Go to https://[employer]. org and sign in to your [employer] account. Enter U020 in the [employer] box to learn more about \"Atrial Fibrillation: Care Instructions. \"     If you do not have an account, please click on the \"Sign Up Now\" link. Current as of: January 10, 2022               Content Version: 13.2  © 2006-2022 [employer]wise, Incorporated. Care instructions adapted under license by [facility] ([facility]). If you have questions about a medical condition or this instruction, always ask your healthcare professional. [place] [address] any warranty or liability for your use of this information.

## 2022-06-16 NOTE — TELEPHONE ENCOUNTER
Called Jon to recommend resuming the ASA 81 mg daily. Gloria Rodrigues states he is taking the Eliquis 5 mg BID but states he skips 2 pills a week due to recurrent rectal bleeding. He notes 1 episode every 4-5 weeks but states the bleeding is minimal.     Educated on cardiac recommendations taking the Eliquis as prescribed and resuming the ASA with history of CAD. Discussed a GI referral but at this point he is not interested in resuming the ASA and states he wants to continue \"as is. \" Instructed to call with worsening bleeding or issues and he v/u.

## 2022-06-16 NOTE — PROGRESS NOTES
[facility]      Cardiology Consult    Esvin Crowell  1945 June 16, 2022    Reason for Visit: CAD, PAF    CC: \"I was lightheaded on Sunday. \"     HPI:  The patient is 64 y.o. male with a past medical history significant for CAD s/p PCI, HTN, and DM who presents for management of CAD. He was hospitalized 5/1-5/3/2016 after presenting to [facility] with left arm pain and chest pressure and was found to have acute inferior wall MI. He was transferred to [facility] for urgent cardiac cath which revealed a 100% mid Lcx stenosis that was successfully intervened with DENA placed. He has residual disease in his LAD, diagnoal & distal RCA. He was admitted to [facility] 2/2017 with atypical chest pains. He ruled out for an MI and underwent stress testing which did not show any reversible defects. He was seen by Dr. Francois Thomason in outpatient follow up and underwent PCI of the first PDA, diagonal branch and FFR of the lesions in the left circumflex artery and LAD. He was admitted 1/16/21 to [facility] from [facility] with slurred speech, confusion, and syncope. His MRI showed nothing acute but multiple old infarcts. He was seen by Dr. Francisco Javier Coleman for elevated troponin which did not require any further inpatient cardiac work up. While on telemetry, a short run of Afib was noted and he was started on Amiodarone and Eliquis 5 mg bid. He reported the Eliquis was causing him to have bright red rectal bleeding. Holding his Eliquis for a couple days improved his rectal bleeding but he continued to experience rectal bleeding about once monthly. 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 Today, he states overall he is feeling \"okay\" but notes an episode of lightheadedness on Sunday. He denies any syncope, chest pains, or worsening shortness of breath.  He checked his blood sugar and blood pressure with the episode and states it was \"normal.\" He was unaware of the arrhythmia in 2021 and denies any palpitations or sensation of his heart racing. He utilizes a cane with ambulation and denies any recent falls but reports left leg weakness from prior CVA. He denies any recurrent TIA or stroke like symptoms. He reports compliance with his medications and denies any abnormal bleeding or bruising. Patient denies exertional chest pain/pressure, dyspnea at rest, worsening MERCEDES, PND, orthopnea, palpitations, weight changes, changes in LE edema, and syncope. Past Medical History:   Diagnosis Date    Asthma     as a child-not current    CAD (coronary artery disease)     Carotid stenosis, right 10/11/2021    Chronic diastolic congestive heart failure ([address] 2021    CKD (chronic kidney disease)     [facility]     Diabetes mellitus ([address]    [place] (hard of hearing)     wears bilat hearing aids    HTN (hypertension)     Hyperlipidemia     Melanoma ([address]     back    PAF (paroxysmal atrial fibrillation) [facility]     Prostate CA ([facility]     Syncope 01/15/2021     Past Surgical History:   Procedure Laterality Date    CORONARY ANGIOPLASTY WITH STENT PLACEMENT  2016    DENA to mid circ    CYSTOSCOPY  2018    cysto, [facility]    HERNIA REPAIR Bilateral distant past    SHOULDER SURGERY Bilateral 10-12 years ago     History reviewed. No pertinent family history.   Social History     Tobacco Use    Smoking status: Former Smoker     Types: Cigarettes     Quit date: 1986     Years since quittin.1    Smokeless tobacco: Never Used   Vaping Use    Vaping Use: Never used   Substance Use Topics    Alcohol use: No     Alcohol/week: 0.0 standard drinks    Drug use: No     Allergies   Allergen Reactions    Doxycycline Monohydrate      Unsure of reaction    Tetracyclines & Related Other (See Comments)     welts    Bactrim [Sulfamethoxazole-Trimethoprim] Rash     Current Outpatient Medications   Medication Sig Dispense Refill    TRULICITY 1.5 NS/9.3NA SOPN       triamcinolone (KENALOG) 0.1 % cream Apply topically 2 times daily      lisinopril (PRINIVIL;ZESTRIL) 5 MG tablet Take 1 tablet by mouth daily 30 tablet 5    amLODIPine (NORVASC) 2.5 MG tablet TAKE ONE TABLET BY MOUTH DAILY 90 tablet 1    rosuvastatin (CRESTOR) 20 MG tablet TAKE ONE TABLET BY MOUTH DAILY 90 tablet 1    amiodarone (CORDARONE) 200 MG tablet TAKE ONE TABLET BY MOUTH DAILY 90 tablet 0    ELIQUIS 5 MG TABS tablet TAKE 1 TABLET BY MOUTH TWICE DAILY 180 tablet 3    metoprolol succinate (TOPROL XL) 50 MG extended release tablet TAKE ONE TABLET BY MOUTH DAILY 90 tablet 3    insulin lispro (HUMALOG) 100 UNIT/ML injection vial Inject into the skin 3 times daily (before meals) 12 units with breakfast  14 units with lunch  16 units with dinner      loratadine (CLARITIN) 10 MG tablet Take 10 mg by mouth daily [employer]      insulin glargine (LANTUS) 100 UNIT/ML injection vial Inject 32 Units into the skin 2 times daily       nitroGLYCERIN (NITROSTAT) 0.4 MG SL tablet Place 1 tablet under the tongue every 5 minutes as needed for Chest pain 25 tablet 3     No current facility-administered medications for this visit. Review of Systems:  · Constitutional: no unanticipated weight loss. There's been no change in sleep pattern, or activity level. No fevers, chills. Fatigue. · Eyes: No visual changes or diplopia. No scleral icterus. · ENT: No Headaches, hearing loss or vertigo. No mouth sores or sore throat. · Cardiovascular: as reviewed in HPI  · Respiratory: No cough or wheezing, no sputum production. No hematemesis. · Gastrointestinal: No abdominal pain, appetite loss, blood in stools. No change in bowel or bladder habits. · Genitourinary: No dysuria, trouble voiding, or hematuria. · Musculoskeletal:  No gait disturbance, no joint complaints. Muscle weakness bilateral legs. · Integumentary: No rash or pruritis.   · Neurological: No headache, diplopia, change in muscle strength, numbness or tingling. · Psychiatric: No anxiety or depression. · Endocrine: No temperature intolerance. No excessive thirst, fluid intake, or urination. No tremor. · Hematologic/Lymphatic: No abnormal bruising or bleeding, blood clots or swollen lymph nodes. · Allergic/Immunologic: No nasal congestion or hives. Physical Exam:   /72   Pulse 80   Ht 5' 9\" (1.753 m)   Wt 186 lb (84.4 kg)   SpO2 98%   BMI 27.47 kg/m²   Wt Readings from Last 3 Encounters:   06/16/22 186 lb (84.4 kg)   05/25/22 186 lb 6.4 oz (84.6 kg)   02/23/22 190 lb 1.6 oz (86.2 kg)     Constitutional: He is oriented to person, place, and time. He appears well-developed and well-nourished. In no acute distress. Head: Normocephalic and atraumatic. Pupils equal and round. Neck: Neck supple. No JVP or carotid bruit appreciated. No mass and no thyromegaly present. No lymphadenopathy present. Cardiovascular: Normal rate. Normal heart sounds. Exam reveals no gallop and no friction rub. No murmur heard. Pulmonary/Chest: Effort normal and breath sounds normal. No respiratory distress. He has no wheezes, rhonchi or rales. Abdominal: Soft, non-tender. Bowel sounds are normal. He exhibits no organomegaly, mass or bruit. Extremities: Trace BLE edema. No cyanosis or clubbing. Pulses are 2+ radial/dorsalis pedis/posterior tibial/carotid bilaterally. Neurological: No gross cranial nerve deficit. Coordination normal.   Skin: Skin is warm and dry. There is no rash or diaphoresis. Psychiatric: He has a normal mood and affect. His speech is normal and behavior is normal.     Lab Review:   FLP:  2/2017 BUN30, Cr 1.7  FLP 9/2018 , Trig 115, LDL 63, HDL 62  FLP: 1/2022 TG 91, HDL 57, LDL 53.  Lab Results   Component Value Date    TRIG 54 01/15/2021    HDL 44 01/15/2021    LDLCALC 49 01/15/2021    LABVLDL 11 01/15/2021     BUN/Creatinine:   Lab Results   Component Value Date    BUN 28 01/16/2021    CREATININE 1.5 01/16/2021 9/2018 BUN 34, Cr 1.5. K 4.7.   1/2022 BUN 30, Cr 2.43. K 5.1. TSH 2.280. LFT's normal     EKG Interpretation: 10/7/16 Sinus rhythm. Nonspecific T-waves abnormality. 1/15/21 Normal sinus rhythm, T wave abnormality, consider lateral ischemia. 1/13/22 Sinus  Rhythm. First degree A-V block. Poor R-wave progression. Nonspecific T-abnormality. 6/16/22 Sinus  Rhythm. First degree A-V block. Possible anterior infarct. Nonspecific T-abnormality. [facility] 5/1/2016:  1. Left main trunk: It arises from the left sinus of Valsalva. It divides into left anterior descending artery and left circumflex artery. The left main trunk is free of atherosclerosis. 2.  Left anterior descending artery: It is a moderate-sized artery and it descends into the intraventricular sulcus and wraps around the apex of the heart. The left main trunk is free of atherosclerosis. 3.  Left anterior descending artery: It is a moderate-sized artery. It descends into the intraventricular sulcus and wraps around the apex of the heart. The left anterior descending artery has about 50% stenosis in the mid to distal portion. There is also a 50% stenosis of the diagonal branch in the proximal segment. The lumen of the left anterior descending artery is free of any significant atherosclerosis. 4.  Left circumflex artery: It arises from the left main trunk. It is 100% occluded after a 1st obtuse marginal branch. The right coronary artery arises from right sinus on Valsalva. It is a dominant artery. It gives rise to a posterior descending and posterolateral branches. Right coronary artery has evidence of about 90% to 95% stenosis in the mid to distal portion of the PDA. There is also about 50% to 60% stenosis of the 2nd PDA in the midsegment, but it does not appear hemodynamically significant. 5.  Left ventriculogram reveals overall preserved left ventricular systolic function.        Successful angioplasty followed by stent deployment in the mid circumflex artery using a drug-eluting stent, 100% stenosis reduced to 0% using 3.25 by 18 Alpine stent. Final diameter was 3.34 with 0% residual stenosis. SAMEER grade-3 flow was established. There appears to be a 40% to 50% stenosis of the distal circumflex branch beyond the area of the stenosis. There appears to be also a microembolization in a small distal circumflex branch. Echo 5/2/2016:  Left ventricle size is normal.  Mild concentric left ventricular hypertrophy is present. Ejection fraction is visually estimated to be 50-55 %. There is reversal of E/A inflow velocities across the mitral valve suggesting impaired left ventricular relaxation. The left atrium is dilated. Myoview 2/22/17 [facility] report)  Fixed defect at the \"superior aspect of the inferolateral wall. \"  EF 52%. Cath 12/2019  Previous stent in the mid left circumflex artery is widely patent. Beyond the stent there is moderate disease with 60% stenosis. LAD-mid LAD has moderate disease. First diagonal branch has 90% segmental stenosis. RCA PDA1 has 90% stenosis. PDA2 has 80% stenosis. PCI of the first PDA, diagonal branch and FFR of the lesions in the left circumflex artery and LAD. CT 1/14/21  Occluded left posterior cerebral artery, there is collerateralization to the distal circulation. 70% or greater right carotid artery, bilateral vertebral artery stenosis, 80-90% on the left and up to 50% on the right. Echo 1/15/21  There is concentric left ventricular hypertrophy. Ejection fraction is visually estimated to be 04-06%  Grade I diastolic dysfunction with normal LV filling pressures. Normal right ventricular size and function. Assessment and Plan:  1. Lightheadedness. Reports one recent episode but denies any syncope. He denies any recurrence. Discussed continued observation, completing routine lab work, or wearing an event monitor.  Will repeat carotid dopplers with prior CTA showing (70% R-ICA) stenosis and vertebral artery stenosis. Discussed a referral to neurology with recurrent symptoms but uncertain if symptoms related to VBI. 2. CAD s/p STEMI (2016) and DENA Lcx (2016), PDA and diag (2019). Asymptomatic. Continue with medical management and risk factor modification including statin and B-blocker. Recommend resuming ASA 81mg daily with no active bleeding and CBC remains stable. 3. Paroxysmal atrial fibrillation. Asymptomatic. CHADS-Vasc is at least 7 (HTN, age, DM, CVA, CAD). Treatment options for atrial fibrillation discussed including rate control, risks and anticoagulation options, antiarrhythmics, watchman device and cardioversion. Continue Eliquis 5 mg bid for now and amiodarone 200 mg daily. Will continue with routine lab work while on chronic amiodarone therapy. Will attempt to get pt assistance for Eliquis. Consider Watchman device in future. 4. PAD (L-subclavian stenosis)/carotid stenosis (70% R-ICA)/Occluded L-PCA. Continue anticoagulation at this time and will check carotid dopplers. Recommend resuming ASA 81 mg daily. 5. Essential hypertension. Controlled. Goal BP <130/80. Continue medical therapy. Previously discontinued Nifedipine and aldactone with reports of hypotension. Encouraged to monitor blood pressure at home. 6. CKD. Stage IIIb. Creatinine 2.14 3/2022    7. TIA. Continue anticoagulation. 8. Rectal bleeding. CBC remains stable and denies any recurrent bleeding. Will continue to monitor while on chronic anticoagulation. 9. HLD. LDL goal<70. LDL 53 1/2022. Continue crestor 20mg daily. Follow up in 6 months    Thank you very much for allowing me to participate in the care of your patient. Please do not hesitate to contact me if you have any questions. Sincerely,  Allen Villela.  Guido Foster, [address] [address]  Ph: [phone]  Fax: [phone]    This note was scribed in the presence of Dr Guido Foster MD by Misty Dakin Tatum Joseph RN. Physician Attestation: The scribes documentation has been prepared under my direction and personally reviewed by me in its entirety. I confirm that the note above accurately reflects all work, treatment, procedures, and medical decision making performed by me. All portions of the note including but not limited to the chief complaint, history of present illness, physical exam, assessment and plan/medical decision making were personally reviewed, edited, and updated on the day of the visit.

## 2022-07-05 RX ORDER — APIXABAN 5 MG/1
TABLET, FILM COATED ORAL
Qty: 180 TABLET | Refills: 3 | Status: SHIPPED | OUTPATIENT
Start: 2022-07-05 | End: 2022-09-22 | Stop reason: ALTCHOICE

## 2022-07-13 RX ORDER — ROSUVASTATIN CALCIUM 20 MG/1
TABLET, COATED ORAL
Qty: 90 TABLET | Refills: 1 | Status: SHIPPED | OUTPATIENT
Start: 2022-07-13 | End: 2022-09-22 | Stop reason: ALTCHOICE

## 2022-09-20 RX ORDER — AMIODARONE HYDROCHLORIDE 200 MG/1
TABLET ORAL
Qty: 90 TABLET | Refills: 3 | Status: SHIPPED | OUTPATIENT
Start: 2022-09-20

## 2022-09-20 NOTE — TELEPHONE ENCOUNTER
Last OV:2022  Last Labs:2022  Last Refills:2021  Next Appt:2022  Last EK2022      amiodarone (CORDARONE) 200 MG tablet

## 2022-09-20 NOTE — TELEPHONE ENCOUNTER
Medication Refill    Medication needing refilled:  amiodarone (CORDARONE)    Dosage of the medication:  200 MG tablet     How are you taking this medication (QD, BID, TID, QID, PRN):  TAKE ONE TABLET BY MOUTH DAILY           30 or 90 day supply called in:  90 tablet      Which Pharmacy are we sending the medication to?:  [id number] - [address], [address] [id number]   Phone:  [phone]  Fax:  [phone]

## 2022-09-21 NOTE — PROGRESS NOTES
[facility]      Cardiology Consult    Katiana Jimenez  1945 September 22, 2022    Reason for Visit: CAD, PAF    CC: \"I'm doing okay. \"     HPI:  The patient is 76 y.o. male with a past medical history significant for CAD s/p PCI, HTN, and DM who presents for management of CAD. He was hospitalized 5/1-5/3/2016 after presenting to [facility] with left arm pain and chest pressure and was found to have acute inferior wall MI. He was transferred to [facility] for urgent cardiac cath which revealed a 100% mid Lcx stenosis that was successfully intervened with DENA placed. He has residual disease in his LAD, diagnoal & distal RCA. He was admitted to [facility] 2/2017 with atypical chest pains. He ruled out for an MI and underwent stress testing which did not show any reversible defects. He was seen by Dr. Chula Chow in outpatient follow up and underwent PCI of the first PDA, diagonal branch and FFR of the lesions in the left circumflex artery and LAD. He was admitted 1/16/21 to [facility] from [facility] with slurred speech, confusion, and syncope. His MRI showed nothing acute but multiple old infarcts. He was seen by Dr. Helen Cooney for elevated troponin which did not require any further inpatient cardiac work up. While on telemetry, a short run of Afib was noted and he was started on Amiodarone and Eliquis 5 mg bid. He reported the Eliquis was causing him to have bright red rectal bleeding. Holding his Eliquis for a couple days improved his rectal bleeding but he continued to experience rectal bleeding about once monthly. 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 He was admitted 9/15/22 with stroke like symptoms. His CT and MRI were negative for any acute findings but bilateral subdural hygromas were found. He reported taking his Eliquis only 5 times a week. He was evaluated by neurology and cardiology who recommended the [facility] but patient was not initially interested. Today, he is accompanied by his daughter. He states overall he is feeling \"okay\" since being discharged. He denies any new cardiac sounding complaints. He denies any chest pains, worsening shortness of breath or palpitations. He is willing to see if there are other alternatives to anticoagulation but again states he will not take the Eliquis any more than 5 days a week. He utilizes a cane with ambulation and denies any recent falls but reports left leg weakness from prior CVA. He continues to follow with GI and scheduled a rectal laser ablation/colonoscopy 10/19/22 to hopefully stop the source of bleeding. He denies any recurrent TIA or stroke like symptoms. He reports compliance with his medications and denies any abnormal bleeding or bruising. Patient denies exertional chest pain/pressure, dyspnea at rest, worsening MERCEDES, PND, orthopnea, palpitations, lightheadedness, weight changes, changes in LE edema, and syncope. Past Medical History:   Diagnosis Date    Asthma     as a child-not current    CAD (coronary artery disease)     Carotid stenosis, right 10/11/2021    Chronic diastolic congestive heart failure ([address] 2021    CKD (chronic kidney disease)     CVA (cerebral vascular accident) ([address]     Diabetes mellitus ([address]     [place] IRA (hard of hearing)     wears bilat hearing aids    HTN (hypertension)     Hyperlipidemia     Melanoma ([address]     back    PAF (paroxysmal atrial fibrillation) ([address]     Prostate CA ([address]     Syncope 01/15/2021     Past Surgical History:   Procedure Laterality Date    CORONARY ANGIOPLASTY WITH STENT PLACEMENT  2016    DENA to mid circ    CYSTOSCOPY  2018    cysto, [place] T[facility]    HERNIA REPAIR Bilateral distant past    SHOULDER SURGERY Bilateral 10-12 years ago     History reviewed. No pertinent family history.   Social History     Tobacco Use    Smoking status: Former     Types: Cigarettes     Quit date: 1986     Years since quittin.4    Smokeless tobacco: Never   Vaping Use    Vaping Use: Never used   Substance Use Topics    Alcohol use: No     Alcohol/week: 0.0 standard drinks    Drug use: No     Allergies   Allergen Reactions    Doxycycline Monohydrate      Unsure of reaction    Tetracyclines & Related Other (See Comments)     shaye    Bactrim [Sulfamethoxazole-Trimethoprim] Rash     Current Outpatient Medications   Medication Sig Dispense Refill    atorvastatin (LIPITOR) 10 MG tablet Take 10 mg by mouth daily      ergocalciferol (ERGOCALCIFEROL) 1.25 MG (94165 UT) capsule TAKE 1 CAPSULE BY MOUTH TWICE PER WEEK      clindamycin (CLEOCIN T) 1 % external solution Apply topically 2 times daily      diphenoxylate-atropine (LOMOTIL) 2.5-0.025 MG per tablet TAKE ONE TABLET BY MOUTH THREE TIMES A DAY AS NEEDED FOR DIARRHEA, ABDOMINAL CRAMPS, AND SPASMS      Mirabegron (MYRBETRIQ PO) Take by mouth daily      amiodarone (CORDARONE) 200 MG tablet TAKE ONE TABLET BY MOUTH DAILY 90 tablet 3    amLODIPine (NORVASC) 2.5 MG tablet TAKE ONE TABLET BY MOUTH DAILY 90 tablet 1    lisinopril (PRINIVIL;ZESTRIL) 5 MG tablet TAKE ONE TABLET BY MOUTH DAILY 30 tablet 5    TRULICITY 1.5 QH/5.3NM SOPN       triamcinolone (KENALOG) 0.1 % cream Apply topically 2 times daily      insulin lispro (HUMALOG) 100 UNIT/ML injection vial Inject into the skin 3 times daily (before meals) 12 units with breakfast  14 units with lunch  16 units with dinner      loratadine (CLARITIN) 10 MG tablet Take 10 mg by mouth daily [employer]      insulin glargine (LANTUS) 100 UNIT/ML injection vial Inject 32 Units into the skin 2 times daily       nitroGLYCERIN (NITROSTAT) 0.4 MG SL tablet Place 1 tablet under the tongue every 5 minutes as needed for Chest pain 25 tablet 3     No current facility-administered medications for this visit. Review of Systems:  Constitutional: no unanticipated weight loss. There's been no change in sleep pattern, or activity level. No fevers, chills. Fatigue.    Eyes: No visual changes or diplopia. No scleral icterus. ENT: No Headaches, hearing loss or vertigo. No mouth sores or sore throat. Cardiovascular: as reviewed in HPI  Respiratory: No cough or wheezing, no sputum production. No hematemesis. Gastrointestinal: No abdominal pain, appetite loss, blood in stools. No change in bowel or bladder habits. Genitourinary: No dysuria, trouble voiding, or hematuria. Musculoskeletal:  No gait disturbance, no joint complaints. Muscle weakness bilateral legs. Integumentary: No rash or pruritis. Neurological: No headache, diplopia, change in muscle strength, numbness or tingling. Psychiatric: No anxiety or depression. Endocrine: No temperature intolerance. No excessive thirst, fluid intake, or urination. No tremor. Hematologic/Lymphatic: No abnormal bruising or bleeding, blood clots or swollen lymph nodes. Allergic/Immunologic: No nasal congestion or hives. Physical Exam:   BP (!) 160/78   Pulse 77   Ht 5' 9\" (1.753 m)   Wt 187 lb 9.6 oz (85.1 kg)   SpO2 96%   BMI 27.70 kg/m²   Wt Readings from Last 3 Encounters:   09/22/22 187 lb 9.6 oz (85.1 kg)   06/16/22 186 lb (84.4 kg)   05/25/22 186 lb 6.4 oz (84.6 kg)     Constitutional: He is oriented to person, place, and time. He appears well-developed and well-nourished. In no acute distress. Head: Normocephalic and atraumatic. Pupils equal and round. Neck: Neck supple. No JVP or carotid bruit appreciated. No mass and no thyromegaly present. No lymphadenopathy present. Cardiovascular: Normal rate. Normal heart sounds. Exam reveals no gallop and no friction rub. No murmur heard. Pulmonary/Chest: Effort normal and breath sounds normal. No respiratory distress. He has no wheezes, rhonchi or rales. Abdominal: Soft, non-tender. Bowel sounds are normal. He exhibits no organomegaly, mass or bruit. Extremities: Trace BLE edema. No cyanosis or clubbing. Pulses are 2+ radial/dorsalis pedis/posterior tibial/carotid bilaterally.    Neurological: No gross cranial nerve deficit. Coordination normal.   Skin: Skin is warm and dry. There is no rash or diaphoresis. Psychiatric: He has a normal mood and affect. His speech is normal and behavior is normal.     Lab Review:   FLP:  2/2017 BUN30, Cr 1.7  FLP 9/2018 , Trig 115, LDL 63, HDL 62  FLP: 1/2022 TG 91, HDL 57, LDL 53.  Lab Results   Component Value Date/Time    TRIG 54 01/15/2021 03:41 AM    HDL 44 01/15/2021 03:41 AM    LDLCALC 49 01/15/2021 03:41 AM    LABVLDL 11 01/15/2021 03:41 AM     BUN/Creatinine:   Lab Results   Component Value Date/Time    BUN 28 01/16/2021 04:36 AM    CREATININE 1.5 01/16/2021 04:36 AM    9/2018 BUN 34, Cr 1.5. K 4.7.   1/2022 BUN 30, Cr 2.43. K 5.1. TSH 2.280. LFT's normal     EKG Interpretation: 10/7/16 Sinus rhythm. Nonspecific T-waves abnormality. 1/15/21 Normal sinus rhythm, T wave abnormality, consider lateral ischemia. 1/13/22 Sinus  Rhythm. First degree A-V block. Poor R-wave progression. Nonspecific T-abnormality. 9/22/22 Sinus  Rhythm. First degree A-V block. Possible anterior infarct. Nonspecific T-abnormality. [facility] 5/1/2016:  1. Left main trunk: It arises from the left sinus of Valsalva. It divides into left anterior descending artery and left circumflex artery. The left main trunk is free of atherosclerosis. 2.  Left anterior descending artery: It is a moderate-sized artery and it descends into the intraventricular sulcus and wraps around the apex of the heart. The left main trunk is free of atherosclerosis. 3.  Left anterior descending artery: It is a moderate-sized artery. It descends into the intraventricular sulcus and wraps around the apex of the heart. The left anterior descending artery has about 50% stenosis in the mid to distal portion. There is also a 50% stenosis of the diagonal branch in the proximal segment. The lumen of the left anterior descending artery is free of any significant atherosclerosis.   4.  Left circumflex or greater right carotid artery, bilateral vertebral artery stenosis, 80-90% on the left and up to 50% on the right. Echo 1/15/21  There is concentric left ventricular hypertrophy. Ejection fraction is visually estimated to be 41-49%  Grade I diastolic dysfunction with normal LV filling pressures. Normal right ventricular size and function. CT head and neck 9/15/22 ([facility]   Visible aortic arch patent shows no gross aneurysm or dissection. Approximately 50% narrowing in the right internal carotid artery at and beyond the origin. Common carotid artery and cervical segments of the internal carotid artery are patent without evidence of flow-limiting stenosis or dissection. Vertebral arteries are grossly patent without flow-limiting stenosis or dissection. Left posterior cerebral artery is patent initially with near complete/complete occlusion approximately 7-8 mm from the origin. Otherwise no occlusion or flow limiting stenosis of the central intracranial circulation. No aneurysm. Severe narrowing/occlusion of the left posterior cerebral artery just beyond the origin. Otherwise no large vessel occlusion, dissection, or aneurysm identified. MRI 9/16/22   Bilateral chronic subdural hygromas measuring approximately 5 mm in   thickness. No superimposed acute intracranial abnormality. Assessment and Plan:  1. Paroxysmal atrial fibrillation. Asymptomatic. CHADS-Vasc is at least 7 (HTN, age, DM, CVA, CAD). HAS-Bled score of 5. Treatment options for atrial fibrillation discussed including rate control, risks and anticoagulation options, antiarrhythmics, watchman device and cardioversion. Continue Eliquis 5 mg bid and amiodarone 200 mg daily. Will continue with routine lab work while on chronic amiodarone therapy. Consideration of a Watchman device seems appropriate with noncompliance with therapy, recurrent rectal bleeding, presumed past SDH and sudural hygromas.  Patient is agreeable and will arrange for an appointment with Dr. Dai Dickey to further discuss. Encouraged continued follow up with GI.     2. CAD s/p STEMI (2016) and DENA Lcx (2016), PDA and diag (2019). Asymptomatic. Continue with medical management and risk factor modification including statin and B-blocker. Would ideally be on ASA but patient not taking with recurrent bleeding. Referral to  to discuss Watchman procedure. 3. PAD (L-subclavian stenosis)/carotid stenosis (70% R-ICA)/Occluded L-PCA. Continue anticoagulation at this time and Crestor 20 mg daily. 4. Essential hypertension. Uncontrolled. Goal BP <130/80. Continue medical therapy. Previously discontinued Nifedipine and aldactone with reports of hypotension. Encouraged to monitor blood pressure at home. 5. CKD. Stage IIIb. Creatinine stable at 1.7 9/2022. 6. TIA/SDH/subdural hygromas. Continue anticoagulation for now and encouraged follow up with GI as scheduled. Will refer to Dr. Dai Dickey for watchman consideration. 7. Rectal bleeding. CBC remains stable. Will continue to monitor while on chronic anticoagulation. 8. HLD. LDL goal<70. LDL 40 9/2022. Continue crestor 20mg daily. 9. Pre-operative risk assessment. Patient is high cardiac risk based on RCRI score of 3 (MI, DM on insulin, and CVA). Patient's risk should not preclude him from proceeding with surgery. Suggest continuation of B-blocker and statin in the lenka-operative period. May hold the Eliquis for 2 days prior and resume when possible. Follow up in 6 months    Thank you very much for allowing me to participate in the care of your patient. Please do not hesitate to contact me if you have any questions. Sincerely,  Radha Momin. Ann Mayer, [address] [place] [address], [address] [place] [address]  Ph: [phone]  Fax: [phone]    This note was scribed in the presence of Dr Ann Mayer MD by Maximus Cedeño, PATRIZIA.    Physician Attestation: The scribes documentation has been prepared under my direction and personally reviewed by me in its entirety. I confirm that the note above accurately reflects all work, treatment, procedures, and medical decision making performed by me. All portions of the note including but not limited to the chief complaint, history of present illness, physical exam, assessment and plan/medical decision making were personally reviewed, edited, and updated on the day of the visit.

## 2022-09-22 ENCOUNTER — OFFICE VISIT (OUTPATIENT)
Dept: CARDIOLOGY CLINIC | Age: 77
End: 2022-09-22
Payer: MEDICARE

## 2022-09-22 VITALS
OXYGEN SATURATION: 96 % | SYSTOLIC BLOOD PRESSURE: 160 MMHG | BODY MASS INDEX: 27.78 KG/M2 | HEART RATE: 77 BPM | WEIGHT: 187.6 LBS | HEIGHT: 69 IN | DIASTOLIC BLOOD PRESSURE: 78 MMHG

## 2022-09-22 DIAGNOSIS — E78.00 PURE HYPERCHOLESTEROLEMIA: ICD-10-CM

## 2022-09-22 DIAGNOSIS — G96.08 SUBDURAL HYGROMA: ICD-10-CM

## 2022-09-22 DIAGNOSIS — Z01.810 PREOP CARDIOVASCULAR EXAM: ICD-10-CM

## 2022-09-22 DIAGNOSIS — Z87.19 HISTORY OF RECTAL BLEEDING: ICD-10-CM

## 2022-09-22 DIAGNOSIS — I25.2 HISTORY OF ST ELEVATION MYOCARDIAL INFARCTION (STEMI): ICD-10-CM

## 2022-09-22 DIAGNOSIS — I10 ESSENTIAL HYPERTENSION: ICD-10-CM

## 2022-09-22 DIAGNOSIS — G45.9 TIA (TRANSIENT ISCHEMIC ATTACK): ICD-10-CM

## 2022-09-22 DIAGNOSIS — I48.0 PAF (PAROXYSMAL ATRIAL FIBRILLATION) (HCC): ICD-10-CM

## 2022-09-22 DIAGNOSIS — I25.10 CORONARY ARTERY DISEASE INVOLVING NATIVE CORONARY ARTERY OF NATIVE HEART WITHOUT ANGINA PECTORIS: Primary | ICD-10-CM

## 2022-09-22 DIAGNOSIS — N18.32 STAGE 3B CHRONIC KIDNEY DISEASE (HCC): ICD-10-CM

## 2022-09-22 PROCEDURE — G8427 DOCREV CUR MEDS BY ELIG CLIN: HCPCS | Performed by: INTERNAL MEDICINE

## 2022-09-22 PROCEDURE — G8417 CALC BMI ABV UP PARAM F/U: HCPCS | Performed by: INTERNAL MEDICINE

## 2022-09-22 PROCEDURE — 93000 ELECTROCARDIOGRAM COMPLETE: CPT | Performed by: INTERNAL MEDICINE

## 2022-09-22 PROCEDURE — 99214 OFFICE O/P EST MOD 30 MIN: CPT | Performed by: INTERNAL MEDICINE

## 2022-09-22 PROCEDURE — 1123F ACP DISCUSS/DSCN MKR DOCD: CPT | Performed by: INTERNAL MEDICINE

## 2022-09-22 PROCEDURE — 1036F TOBACCO NON-USER: CPT | Performed by: INTERNAL MEDICINE

## 2022-09-22 RX ORDER — ERGOCALCIFEROL (VITAMIN D2) 1250 MCG
CAPSULE ORAL
COMMUNITY
Start: 2022-09-16

## 2022-09-22 RX ORDER — DIPHENOXYLATE HYDROCHLORIDE AND ATROPINE SULFATE 2.5; .025 MG/1; MG/1
TABLET ORAL
COMMUNITY
Start: 2021-11-03 | End: 2022-10-10

## 2022-09-22 RX ORDER — CLINDAMYCIN PHOSPHATE 11.9 MG/ML
SOLUTION TOPICAL 2 TIMES DAILY
COMMUNITY
Start: 2021-11-01 | End: 2022-10-10

## 2022-09-22 RX ORDER — ATORVASTATIN CALCIUM 10 MG/1
10 TABLET, FILM COATED ORAL DAILY
COMMUNITY
End: 2022-09-22

## 2022-09-22 RX ORDER — ROSUVASTATIN CALCIUM 20 MG/1
20 TABLET, COATED ORAL DAILY
Qty: 90 TABLET | Refills: 5 | Status: SHIPPED | OUTPATIENT
Start: 2022-09-22 | End: 2022-10-10

## 2022-10-10 NOTE — FLOWSHEET NOTE
Preoperative Screening for Elective Surgery/Invasive Procedures While COVID-19 present in the community     Have you tested positive or have been told to self-isolate for COVID-19 like symptoms within the past 28 days? Do you currently have any of the following symptoms? Fever >100.0 F or 99.9 F in immunocompromised patients? New onset cough, shortness of breath or difficulty breathing? New onset sore throat, myalgia (muscle aches and pains), headache, loss of taste/smell or diarrhea? Have you had a potential exposure to COVID-19 within the past 14 days by:  Close contact with a confirmed case? Close contact with a healthcare worker,  or essential infrastructure worker (grocery store, [employer], gas station, public utilities or transportation)? Do you reside in a congregate setting such as; skilled nursing facility, adult home, correctional facility, homeless shelter or other institutional setting? Have you had recent travel to a known COVID-19 hotspot? No to all above     * Admitted with diarrhea? [] YES    [x]  NO     *Prior history of C-Diff. In last 3 months? [] YES    [x]  NO     *Antibiotic use in the past 6-8 weeks? [x]  NO    []  YES      If yes, which: REASON_________________     *Prior hospitalization or nursing home in the last month? []  YES    [x]  NO     SAFETY FIRST. .call before you fall    [address] [facility] time__10/19/22 0930__________        Surgery time______1100______    Do not eat or drink anything after 12:00 midnight prior to your surgery. This includes water chewing gum, mints and ice chips- the Day of Surgery. You may brush your teeth and gargle the morning of your surgery, but do not swallow the water     Please see your family doctor/pediatrician for a history and physical and/or questions concerning medications. Bring any test results/reports from your physicians office.    If you are under the care of a heart doctor or specialist doctor, please be aware that you may be asked to them for clearance    You may be asked to stop blood thinners such as Coumadin, Plavix, Fragmin, Lovenox, etc., or any anti-inflammatories such as:  Aspirin, Ibuprofen, Advil, Naproxen prior to your surgery. We also ask that you stop any OTC medications such as fish oil, vitamin E, glucosamine, garlic, Multivitamins, COQ 10, etc.    We ask that you do not smoke 24 hours prior to surgery  We ask that you do not  drink any alcoholic beverages 24 hours prior to surgery     You must make arrangements for a responsible adult to take you home after your surgery. For your safety you will not be allowed to leave alone or drive yourself home. Your surgery will be cancelled if you do not have a ride home. Also for your safety, it is strongly suggested that someone stay with you the first 24 hours after your surgery. A parent or legal guardian must accompany a child scheduled for surgery and plan to stay at the hospital until the child is discharged. Please do not bring other children with you. For your comfort, please wear simple loose fitting clothing to the hospital.  Please do not bring valuables. Do not wear any make-up or nail polish on your fingers or toes. For your safety, please do not wear any jewelry or body piercing's on the day of surgery. All jewelry must be removed. If you have dentures, they will be removed before going to operating room. For your convenience, we will provide you with a container. If you wear contact lenses or glasses, they will be removed, please bring a case for them. If you have a living will and a durable power of  for healthcare, please bring in a copy.      As part of our patient safety program to minimize surgical site infections, we ask you to do the following:    Please notify your surgeon if you develop any illness between         now and the day of your surgery. This includes a cough, cold, fever, sore throat, nausea,         or vomiting, and diarrhea, etc.   Please notify your surgeon if you experience dizziness, shortness         of breath or blurred vision between now and the time of your surgery. Do not shave your operative site 96 hours prior to surgery. For face and neck surgery, men may use an electric razor 48 hours   prior to surgery. You may shower the night before surgery or the morning of   your surgery with an antibacterial soap. You will need to bring a photo ID and insurance card     If you use a C-pap or Bi-pap machine, please bring your machine with you to the hospital     Our goal is to provide you with excellent care, therefore, visitors will be limited to so that we may focus on providing this care for you. Please contact your surgeon office, if you have any further questions. [facility] phone number:  [phone] [facility] fax number:  [phone]    Please note these are generalized instructions for all surgical cases, you may be provided with more specific instructions according to your surgery.

## 2022-10-18 ENCOUNTER — ANESTHESIA EVENT (OUTPATIENT)
Dept: ENDOSCOPY | Age: 77
End: 2022-10-18
Payer: MEDICARE

## 2022-10-19 ENCOUNTER — HOSPITAL ENCOUNTER (OUTPATIENT)
Age: 77
Setting detail: OUTPATIENT SURGERY
Discharge: HOME | End: 2022-10-19
Attending: INTERNAL MEDICINE | Admitting: INTERNAL MEDICINE
Payer: MEDICARE

## 2022-10-19 ENCOUNTER — TELEPHONE (OUTPATIENT)
Dept: CARDIOLOGY CLINIC | Age: 77
End: 2022-10-19

## 2022-10-19 ENCOUNTER — ANESTHESIA (OUTPATIENT)
Dept: ENDOSCOPY | Age: 77
End: 2022-10-19
Payer: MEDICARE

## 2022-10-19 VITALS
WEIGHT: 189 LBS | OXYGEN SATURATION: 97 % | SYSTOLIC BLOOD PRESSURE: 179 MMHG | RESPIRATION RATE: 16 BRPM | BODY MASS INDEX: 27.99 KG/M2 | TEMPERATURE: 97.4 F | HEIGHT: 69 IN | HEART RATE: 76 BPM | DIASTOLIC BLOOD PRESSURE: 89 MMHG

## 2022-10-19 DIAGNOSIS — Z01.818 PRE-OP TESTING: ICD-10-CM

## 2022-10-19 DIAGNOSIS — Z95.818 PRESENCE OF WATCHMAN LEFT ATRIAL APPENDAGE CLOSURE DEVICE: ICD-10-CM

## 2022-10-19 DIAGNOSIS — K62.5 RECTAL BLEEDING: ICD-10-CM

## 2022-10-19 DIAGNOSIS — I48.0 PAROXYSMAL A-FIB (HCC): Primary | ICD-10-CM

## 2022-10-19 LAB
GLUCOSE BLD-MCNC: 141 MG/DL (ref 70–99)
GLUCOSE BLD-MCNC: 150 MG/DL (ref 70–99)
PERFORMED ON: ABNORMAL
PERFORMED ON: ABNORMAL

## 2022-10-19 PROCEDURE — 2709999900 HC NON-CHARGEABLE SUPPLY: Performed by: INTERNAL MEDICINE

## 2022-10-19 PROCEDURE — 2500000003 HC RX 250 WO HCPCS

## 2022-10-19 PROCEDURE — 3700000000 HC ANESTHESIA ATTENDED CARE: Performed by: INTERNAL MEDICINE

## 2022-10-19 PROCEDURE — 3609010200 HC COLONOSCOPY ABLATION TUMOR POLYP/OTHER LES: Performed by: INTERNAL MEDICINE

## 2022-10-19 PROCEDURE — 7100000011 HC PHASE II RECOVERY - ADDTL 15 MIN: Performed by: INTERNAL MEDICINE

## 2022-10-19 PROCEDURE — 3609010600 HC COLONOSCOPY POLYPECTOMY SNARE/COLD BIOPSY: Performed by: INTERNAL MEDICINE

## 2022-10-19 PROCEDURE — 6360000002 HC RX W HCPCS

## 2022-10-19 PROCEDURE — 7100000000 HC PACU RECOVERY - FIRST 15 MIN: Performed by: INTERNAL MEDICINE

## 2022-10-19 PROCEDURE — 2580000003 HC RX 258: Performed by: ANESTHESIOLOGY

## 2022-10-19 PROCEDURE — 88305 TISSUE EXAM BY PATHOLOGIST: CPT

## 2022-10-19 PROCEDURE — 3700000001 HC ADD 15 MINUTES (ANESTHESIA): Performed by: INTERNAL MEDICINE

## 2022-10-19 PROCEDURE — 7100000010 HC PHASE II RECOVERY - FIRST 15 MIN: Performed by: INTERNAL MEDICINE

## 2022-10-19 PROCEDURE — 2720000010 HC SURG SUPPLY STERILE: Performed by: INTERNAL MEDICINE

## 2022-10-19 RX ORDER — SODIUM CHLORIDE 9 MG/ML
INJECTION, SOLUTION INTRAVENOUS CONTINUOUS
Status: DISCONTINUED | OUTPATIENT
Start: 2022-10-19 | End: 2022-10-19 | Stop reason: HOSPADM

## 2022-10-19 RX ORDER — PROPOFOL 10 MG/ML
INJECTION, EMULSION INTRAVENOUS PRN
Status: DISCONTINUED | OUTPATIENT
Start: 2022-10-19 | End: 2022-10-19 | Stop reason: SDUPTHER

## 2022-10-19 RX ORDER — SODIUM CHLORIDE 9 MG/ML
INJECTION, SOLUTION INTRAVENOUS PRN
Status: DISCONTINUED | OUTPATIENT
Start: 2022-10-19 | End: 2022-10-19 | Stop reason: HOSPADM

## 2022-10-19 RX ORDER — PROPOFOL 10 MG/ML
INJECTION, EMULSION INTRAVENOUS CONTINUOUS PRN
Status: DISCONTINUED | OUTPATIENT
Start: 2022-10-19 | End: 2022-10-19 | Stop reason: SDUPTHER

## 2022-10-19 RX ORDER — SODIUM CHLORIDE 0.9 % (FLUSH) 0.9 %
5-40 SYRINGE (ML) INJECTION EVERY 12 HOURS SCHEDULED
Status: DISCONTINUED | OUTPATIENT
Start: 2022-10-19 | End: 2022-10-19 | Stop reason: HOSPADM

## 2022-10-19 RX ORDER — LIDOCAINE HYDROCHLORIDE 20 MG/ML
INJECTION, SOLUTION EPIDURAL; INFILTRATION; INTRACAUDAL; PERINEURAL PRN
Status: DISCONTINUED | OUTPATIENT
Start: 2022-10-19 | End: 2022-10-19 | Stop reason: SDUPTHER

## 2022-10-19 RX ORDER — SODIUM CHLORIDE 0.9 % (FLUSH) 0.9 %
5-40 SYRINGE (ML) INJECTION PRN
Status: DISCONTINUED | OUTPATIENT
Start: 2022-10-19 | End: 2022-10-19 | Stop reason: HOSPADM

## 2022-10-19 RX ADMIN — PROPOFOL 60 MG: 10 INJECTION, EMULSION INTRAVENOUS at 10:31

## 2022-10-19 RX ADMIN — LIDOCAINE HYDROCHLORIDE 80 MG: 20 INJECTION, SOLUTION EPIDURAL; INFILTRATION; INTRACAUDAL; PERINEURAL at 10:31

## 2022-10-19 RX ADMIN — SODIUM CHLORIDE: 9 INJECTION, SOLUTION INTRAVENOUS at 10:03

## 2022-10-19 RX ADMIN — PROPOFOL 140 MCG/KG/MIN: 10 INJECTION, EMULSION INTRAVENOUS at 10:31

## 2022-10-19 ASSESSMENT — PAIN - FUNCTIONAL ASSESSMENT: PAIN_FUNCTIONAL_ASSESSMENT: 0-10

## 2022-10-19 ASSESSMENT — ENCOUNTER SYMPTOMS: SHORTNESS OF BREATH: 1

## 2022-10-19 ASSESSMENT — PAIN SCALES - GENERAL: PAINLEVEL_OUTOF10: 0

## 2022-10-19 NOTE — DISCHARGE INSTRUCTIONS
[facility] Discharge Instructions  Colonoscopy    NAME:  Jennifer Munguia  YOB: 1945  MEDICAL RECORD NUMBER:  [id number]  DATE:  10/19/2022      After receiving Propofol (Diprivan) for Moderate Sedation:    Do not drive or operate any machinery until tomorrow  Do not sign any legal documents or make any critical decisions  Do not drink alcoholic beverages for 24 hours  Plan to spend a few hours resting before resuming your normal routine  Possible side effects are light headedness and sedation    You may resume your usual diet at home    Resume all your daily medications    Call your physician if any of the following occur:    Severe abdominal distention and/or pain. (Mild distention or cramping is normal after this procedure; this should improve within an hour or two with passage of air)  Fever, chills, nausea or vomiting  You may notice a small amount of blood in your next few bowel movements    If excessive bleeding occurs:  Call your physician immediately or proceed to the nearest Emergency Room    Biopsy Obtained: YES. If you have not heard from your physician in one week please call your physician's office for biopsy results, [phone]. Recommendations: Repeat colonoscopy in 5 years         For questions or concerns please contact your GI physician's 24 hour call center at [phone].

## 2022-10-19 NOTE — ANESTHESIA POSTPROCEDURE EVALUATION
Department of Anesthesiology  Postprocedure Note    Patient: Diana Miller  MRN: [id number]  YOB: 1945  Date of evaluation: 10/19/2022      Procedure Summary     Date: 10/19/22 Room / Location: [address] [place] [address]    Anesthesia Start: 1027 Anesthesia Stop: 1050    Procedures:       COLONOSCOPY POLYPECTOMY SNARE/COLD BIOPSY      COLONOSCOPY ABLATION/apc to rectum to prevent bleeding Diagnosis:       Rectal bleeding      (RECTAL BLEEDING)    Surgeons: Radha Borrero MD Responsible Provider: Nicolle Willingham MD    Anesthesia Type: MAC ASA Status: 3          Anesthesia Type: No value filed.     Kendal Phase I: Kendal Score: 10    Kendal Phase II:        Anesthesia Post Evaluation    Patient location during evaluation: PACU  Patient participation: complete - patient participated  Level of consciousness: awake and alert  Pain score: 0  Airway patency: patent  Nausea & Vomiting: no nausea and no vomiting  Complications: no  Cardiovascular status: blood pressure returned to baseline  Respiratory status: acceptable  Hydration status: euvolemic

## 2022-10-19 NOTE — H&P
[facility]   Pre-operative History and Physical    Patient: Dolores Salvador  : 1945  Acct#: [de-identified]    History Obtained From: electronic medical record    HISTORY OF PRESENT ILLNESS  Procedure:Colonoscopy  Indications:radiation proctitis. Past Medical History:        Diagnosis Date    Asthma     as a child-not current    CAD (coronary artery disease)     Carotid stenosis, right 10/11/2021    Chronic diastolic congestive heart failure ([facility] 2021    CKD (chronic kidney disease)     CVA (cerebral vascular accident) ([facility] 2020    Diabetes mellitus ([facility]     History of MI (myocardial infarction)     [place] (hard of hearing) 10/2022    wears bilat hearing aids    HTN (hypertension)     Hyperlipidemia     Melanoma ([facility]     back    PAF (paroxysmal atrial fibrillation) ([facility]     Prostate CA ([facility]     Rectal bleed 10/2022    Syncope 01/15/2021     Past Surgical History:        Procedure Laterality Date    COLONOSCOPY      CORONARY ANGIOPLASTY WITH STENT PLACEMENT  2016    DENA to mid circ    CYSTOSCOPY  2018    cysto, [place] TURP    HERNIA REPAIR Bilateral distant past    SHOULDER SURGERY Bilateral 10-12 years ago     Medications prior to admission:   Prior to Admission medications    Medication Sig Start Date End Date Taking?  Authorizing Provider   ergocalciferol (ERGOCALCIFEROL) 1.25 MG (85544 UT) capsule TAKE 1 CAPSULE BY MOUTH TWICE PER WEEK 22   Historical Provider, MD   Mirabegron (MYRBETRIQ PO) Take by mouth daily    Historical Provider, MD   apixaban (ELIQUIS) 5 MG TABS tablet Take by mouth 2 times daily    Historical Provider, MD   amiodarone (CORDARONE) 200 MG tablet TAKE ONE TABLET BY MOUTH DAILY 22   Elvira Purcell MD   amLODIPine (NORVASC) 2.5 MG tablet TAKE ONE TABLET BY MOUTH DAILY 22   Holden White MD   lisinopril (PRINIVIL;ZESTRIL) 5 MG tablet TAKE ONE TABLET BY MOUTH DAILY 22   Holdne White MD   TRULICITY 1.5 SD/8.5EG SOPN once a week On 22   Historical Provider, MD   triamcinolone (KENALOG) 0.1 % cream Apply topically 2 times daily 21   Historical Provider, MD   insulin lispro (HUMALOG) 100 UNIT/ML injection vial Inject into the skin 3 times daily (before meals) 12 units with breakfast  14 units with lunch  16 units with dinner    Historical Provider, MD   loratadine (CLARITIN) 10 MG tablet Take 10 mg by mouth daily [employer] brand    Historical Provider, MD   insulin glargine (LANTUS) 100 UNIT/ML injection vial Inject 48 Units into the skin daily    Historical Provider, MD     Allergies:   Doxycycline monohydrate, Bactrim [sulfamethoxazole-trimethoprim], and Tetracyclines & related    Social History     Socioeconomic History    Marital status:      Spouse name: Not on file    Number of children: Not on file    Years of education: Not on file    Highest education level: Not on file   Occupational History    Not on file   Tobacco Use    Smoking status: Former     Types: Cigarettes     Quit date: 1986     Years since quittin.4    Smokeless tobacco: Never   Vaping Use    Vaping Use: Never used   Substance and Sexual Activity    Alcohol use: No     Alcohol/week: 0.0 standard drinks    Drug use: No    Sexual activity: Yes     Partners: Female   Other Topics Concern    Not on file   Social History Narrative    Not on file     Social Determinants of Health     Financial Resource Strain: Not on file   Food Insecurity: Not on file   Transportation Needs: Not on file   Physical Activity: Not on file   Stress: Not on file   Social Connections: Not on file   Intimate Partner Violence: Not on file   Housing Stability: Not on file     Family History   Problem Relation Age of Onset    No Known Problems Mother     No Known Problems Father          PHYSICAL EXAM:      BP (!) 177/80   Pulse 81   Resp 18   Ht 5' 9\" (1.753 m)   Wt 189 lb (85.7 kg)   SpO2 96%   BMI 27.91 kg/m²  I        Heart:normal    Lungs: normal    Abdomen: normal      ASA Grade:  See anesthesia note      ASSESSMENT AND PLAN:    1. Procedure options, risks and benefits reviewed with patient and expresses understanding.

## 2022-10-19 NOTE — TELEPHONE ENCOUNTER
Patient has Watchman consult with Dr. Eula Gonzalez on 10/20/2022 at 12:30 PM.  I spoke with patient and confirmed the appointment.

## 2022-10-19 NOTE — PROGRESS NOTES
Vss. No c/o. Abd soft. Tolerated sitting up and po fluids and crackers well. Family at bedside. Verbalized understanding of discharge instructions. BP is 179/89. Dr Sonia Lim notified. No orders given.  Patient told to take his BP RX when he gets home, he did not take it this AM.

## 2022-10-19 NOTE — LETTER
Dear, Rashida Karson procedure has been scheduled for Monday 11/28/2022 at [facility] with Dr. Fili Nunn MD.  Please arrive directly to the Cath Lab at 7:30 am.     Diogo Harper will need to make arrangements to have a responsible adult drive you home after your procedure and someone needs to stay with you for 24 hours. Procedure labs Procedure labs will need to be completed at [facility] Monday 11/21/2022 the week before your procedure. Please check in with Registration in the main lobby of the hospital. The test to perform [address] [facility] NOT OUTPATIENT LAB. are as follows BMP, CBC, Urine test and Type and Screen. Pre-Procedure Instructions:  Shelva Laura You will need to FAST for at least 8 hours prior to your procedure, nothing to eat or drink after midnight.  NO caffeine the morning of your procedure.  You need to wash your body with a soap called HIBICLENS the evening before or the morning of your scheduled procedure from the neck down. A prescription has been sent to your pharmacy. If your insurance does not cover the soap, you can pick it up over the counter, ask your pharmacy for help.  Take Eliquis on 11/26/2022 then hold medication.  Take 1 Baby Aspirin the day of the procedure.  Hold Diabetic medication the day of the procedure.  Hold multi. Vitamins the day of procedure.  ALL other medications can be taken in the morning with sips of water   Do not use any lotions, creams or perfume the morning of procedure.  You will be going home the same day but if unable to you may stay overnight at [facility] after your procedure so, please pack an overnight bag if needed. Pre-certification  Our office will be in contact with your insurance company regarding pre-certification.  If for some reason, your procedure is still pending the day before your scheduled procedure, it must be moved or delayed until we have authorization to proceed. If you have Medicare, no pre-cert is required. Co-Payment  If you have a copay or a deposit due that is your responsibility to pay at the time of service, the Cardiac Cath lab will accept your payment on the day of your procedure. Please bring with you a form of payment. Any questions regarding your copay, please contact your insurance company. Please bring your photo ID, insurance cards and copayment if you have one. (Cash, checks & credit cards are accepted)        On the day of your procedure      Please report directly to  The [address], [address], [place], [address]     You should park in the lot directly in front of the hospital, you are able to [facility] for free from (6AM-4:30PM). The MAIN entrance that will be the Revolving Door. When you come into the hospital ask the  to direct you to the Cardiac Cath Lab. I will be in contact with you the week before your procedure. You may reach me at [phone] in the meantime.      Sincerely,     Christie Felipe RN,  Watchman Coordinator

## 2022-10-19 NOTE — PROGRESS NOTES
PACU Transfer to [facility]    Vitals:    10/19/22 1100   BP: 139/76   Pulse: 71   Resp: 14   Temp:    SpO2: 95%         Intake/Output Summary (Last 24 hours) at 10/19/2022 1102  Last data filed at 10/19/2022 1042  Gross per 24 hour   Intake 200 ml   Output --   Net 200 ml       Pain assessment:  none  Pain Level: 0    Patient transferred to care of [facility] RN.    10/19/2022 11:02 AM

## 2022-10-19 NOTE — PROCEDURES
[facility]  Endoscopy Note    Patient: Dinorah Torre  : 1945  Acct#: [de-identified]    Procedure: Colonoscopy with biopsy, control of bleeding    Date:  10/19/2022    Surgeon:  Salvatore Marrufo MD, MD    Referring Physician:  Lossie Goldmann    Previous Colonoscopy: Yes  Date:  20  Greater than 3 years? No    Preoperative Diagnosis:  radiation proctitis with bleeding. Postoperative Diagnosis:  Colon polyp and radiation proctitis with APC. Anesthesia:  See anesthesia note    Indications: This is a 76y.o. year old male who presents today with rectal bleeding. Procedure: An informed consent was obtained from the patient after explanation of indications, benefits, possible risks and complications of the procedure. The patient was then taken to the endoscopy suite, placed in the left lateral decubitus position, and the above IV anesthesia was administered. A digital rectal examination was performed and revealed negative without mass, lesions or tenderness. The Olympus CFQ-180-AL video colonoscope was placed in the patient's rectum under digital direction and advanced to the cecum. The cecum was identified by characteristic anatomy and ballottment. The ileocecal valve was identified. The preparation was excellent. The scope was then withdrawn back through the cecum, ascending, transverse, descending and sigmoid colons. Carefull circumferential examination of the mucosa in these areas demonstrated a 4 mm polyp in the ascending colon that was biopsied and removed. The rectum has radiation proctitis that was ablated with the argon plasma coagulator. The scope was then withdrawn into the rectum and retroflexed. The retroflexed view of the anal verge and rectum demonstrates radiation proctitis. The scope was straightened, the colon was decompressed and the scope was withdrawn from the patient.   The patient tolerated the procedure well and was taken to the PACU in good condition. Estimated Blood Loss:  minimal.    Impression:  Colon polyp and radiation proctitis ablated with the APC. Recommendations:  Await pathology. Begin Eliquis.     Alice German MD, MD [facility]  10/19/2022

## 2022-10-20 ENCOUNTER — OFFICE VISIT (OUTPATIENT)
Dept: CARDIOLOGY CLINIC | Age: 77
End: 2022-10-20
Payer: MEDICARE

## 2022-10-20 VITALS
WEIGHT: 187 LBS | HEART RATE: 80 BPM | DIASTOLIC BLOOD PRESSURE: 70 MMHG | OXYGEN SATURATION: 98 % | HEIGHT: 69 IN | BODY MASS INDEX: 27.7 KG/M2 | SYSTOLIC BLOOD PRESSURE: 134 MMHG

## 2022-10-20 DIAGNOSIS — I48.0 PAF (PAROXYSMAL ATRIAL FIBRILLATION) (HCC): Primary | ICD-10-CM

## 2022-10-20 PROCEDURE — G8417 CALC BMI ABV UP PARAM F/U: HCPCS | Performed by: INTERNAL MEDICINE

## 2022-10-20 PROCEDURE — 93000 ELECTROCARDIOGRAM COMPLETE: CPT | Performed by: INTERNAL MEDICINE

## 2022-10-20 PROCEDURE — 99215 OFFICE O/P EST HI 40 MIN: CPT | Performed by: INTERNAL MEDICINE

## 2022-10-20 PROCEDURE — 1123F ACP DISCUSS/DSCN MKR DOCD: CPT | Performed by: INTERNAL MEDICINE

## 2022-10-20 PROCEDURE — 1036F TOBACCO NON-USER: CPT | Performed by: INTERNAL MEDICINE

## 2022-10-20 PROCEDURE — G8484 FLU IMMUNIZE NO ADMIN: HCPCS | Performed by: INTERNAL MEDICINE

## 2022-10-20 PROCEDURE — G8427 DOCREV CUR MEDS BY ELIG CLIN: HCPCS | Performed by: INTERNAL MEDICINE

## 2022-10-20 NOTE — TELEPHONE ENCOUNTER
Spoke with patient and family today regarding Watchman procedure. Patient was shown video on Watchman and given educational material.  Patient states interest and would like to proceed. Copy of the Cardio smart tool was given for shared decision.

## 2022-10-20 NOTE — PROGRESS NOTES
[address]  Cardiology Consult    Emmaneul Dean  1945 October 20, 2022    Primary Cardiologist:   Referring Physician:     Reason for Referral: Left atrial appendage closure    CC: \"I have had bleeding issues. \"      Subjective:     History of Present Illness:    Emmanuel Dean is a 76 y.o. patient with a PMH significant for CAD s/p PCI, HTN, and DM who presents for management of CAD. He was hospitalized 5/1-5/3/2016 after presenting to [facility] with left arm pain and chest pressure and was found to have acute inferior wall MI. He was transferred to [facility] for urgent cardiac cath which revealed a 100% mid Lcx stenosis that was successfully intervened with DENA placed. He has residual disease in his LAD, diagnoal & distal RCA. He was admitted to [facility] 2/2017 with atypical chest pains. He ruled out for an MI and underwent stress testing which did not show any reversible defects. He was seen by Dr. Nick Colindres in outpatient follow up and underwent PCI of the first PDA, diagonal branch and FFR of the lesions in the left circumflex artery and LAD. He was admitted 1/16/21 to [facility] from [facility] with slurred speech, confusion, and syncope. His MRI showed nothing acute but multiple old infarcts. He was seen by Dr. Danielito Majano for elevated troponin which did not require any further inpatient cardiac work up. While on telemetry, a short run of Afib was noted and he was started on Amiodarone and Eliquis 5 mg bid. He reported the Eliquis was causing him to have bright red rectal bleeding. Holding his Eliquis for a couple days improved his rectal bleeding but he continued to experience rectal bleeding about once monthly. 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 He was admitted 9/15/22 with stroke like symptoms. His CT and MRI were negative for any acute findings but bilateral subdural hygromas were found. He reported taking his Eliquis only 5 times a week.     Patient is being referred for Left Atrial Appendage Closure with WATCHMAN device for management of stroke risk resulting from non-valvular atrial fibrillation. Based on their past history, it has been determined that they are poor candidates for long-term oral-anticoagulation, however may be tolerant of short term treatment with warfarin as necessary. Today, he is here to discuss Watchman procedure. He is currently on Eliquis but is concerned that he will have another bleed. Patient denies exertional chest pain/pressure, dyspnea at rest, MERCEDES, PND, orthopnea, palpitations, lightheadedness, weight changes, changes in LE edema, and syncope. Patient reports compliance to his medications. Past Medical History:   has a past medical history of Asthma, CAD (coronary artery disease), Carotid stenosis, right, Chronic diastolic congestive heart failure ([address] CKD (chronic kidney disease), CVA (cerebral vascular accident) ([address] Diabetes mellitus ([address] History of MI (myocardial infarction), [place] (hard of hearing), HTN (hypertension), Hyperlipidemia, Melanoma ([address] PAF (paroxysmal atrial fibrillation) ([address] Prostate CA ([address] Rectal bleed, and Syncope. Surgical History:   has a past surgical history that includes hernia repair (Bilateral, distant past); shoulder surgery (Bilateral, 10-12 years ago); Coronary angioplasty with stent (05/01/2016); Cystocopy (08/22/2018); Colonoscopy; Colonoscopy (N/A, 10/19/2022); and Colonoscopy (10/19/2022). Social History:   reports that he quit smoking about 36 years ago. His smoking use included cigarettes. He has never used smokeless tobacco. He reports that he does not drink alcohol and does not use drugs. Family History:  family history includes No Known Problems in his father and mother. Home Medications:  Were reviewed and are listed in nursing record and/or below  Prior to Admission medications    Medication Sig Start Date End Date Taking?  Authorizing Provider ergocalciferol (ERGOCALCIFEROL) 1.25 MG (75999 UT) capsule TAKE 1 CAPSULE BY MOUTH TWICE PER WEEK 9/16/22  Yes Historical Provider, MD   Mirabegron (MYRBETRIQ PO) Take by mouth daily   Yes Historical Provider, MD   apixaban (ELIQUIS) 5 MG TABS tablet Take by mouth 2 times daily   Yes Historical Provider, MD   amiodarone (CORDARONE) 200 MG tablet TAKE ONE TABLET BY MOUTH DAILY 9/20/22  Yes Ana Rosa Moreno MD   amLODIPine (NORVASC) 2.5 MG tablet TAKE ONE TABLET BY MOUTH DAILY 8/21/22  Yes Diogenes Rowan MD   lisinopril (PRINIVIL;ZESTRIL) 5 MG tablet TAKE ONE TABLET BY MOUTH DAILY 8/21/22  Yes Diogenes Rowan MD   TRULICITY 1.5 RN/4.0AT SOPN once a week On sunday 4/30/22  Yes Historical Provider, MD   triamcinolone (KENALOG) 0.1 % cream Apply topically 2 times daily 9/30/21  Yes Historical Provider, MD   insulin lispro (HUMALOG) 100 UNIT/ML injection vial Inject into the skin 3 times daily (before meals) 12 units with breakfast  14 units with lunch  16 units with dinner   Yes Historical Provider, MD   loratadine (CLARITIN) 10 MG tablet Take 10 mg by mouth daily [employer] brand   Yes Historical Provider, MD   insulin glargine (LANTUS) 100 UNIT/ML injection vial Inject 48 Units into the skin daily   Yes Historical Provider, MD        CURRENT Medications:  No current facility-administered medications for this visit. Allergies:  Doxycycline monohydrate, Bactrim [sulfamethoxazole-trimethoprim], and Tetracyclines & related           Review of Systems: All reviewed and refer to HPI  Constitutional: no unanticipated weight loss. There's been no change in energy level, sleep pattern, or activity level. No fevers, chills. Eyes: No visual changes or diplopia. No scleral icterus. ENT: No Headaches, hearing loss or vertigo. No mouth sores or sore throat. Cardiovascular: No Chest pain, tightness or discomfort. No Shortness of breath.    No Dyspnea on exertion, Orthopnea, Paroxysmal nocturnal dyspnea or breathlessness at rest.  No Palpitations. No Syncope ('blackouts', 'faints', 'collapse') or dizziness. Respiratory: No cough or wheezing, no sputum production. No hematemesis. Gastrointestinal: No abdominal pain, appetite loss, blood in stools. No change in bowel or bladder habits. Genitourinary: No dysuria, trouble voiding, or hematuria. Musculoskeletal:  No gait disturbance, no joint complaints. Integumentary: No rash or pruritis. Neurological: No headache, diplopia, change in muscle strength, numbness or tingling. Psychiatric: No anxiety or depression. Endocrine: No temperature intolerance. No excessive thirst, fluid intake, or urination. No tremor. Hematologic/Lymphatic: No abnormal bruising or bleeding, blood clots or swollen lymph nodes. Allergic/Immunologic: No nasal congestion or hives. Objective: all reveiwed      PHYSICAL EXAM:      Vitals:    10/20/22 1230   BP: 134/70   Pulse: 80   SpO2: 98%    Weight: 187 lb (84.8 kg)       General Appearance:  Alert, cooperative, no distress, appears stated age. Head:  Normocephalic, without obvious abnormality, atraumatic. Eyes:  Pupils equal and round. No scleral icterus. Mouth: Moist mucosa, no pharyngeal erythema. Nose: Nares normal. No drainage or sinus tenderness. Neck: Supple, symmetrical, trachea midline. No adenopathy. No tenderness/mass/nodules. No carotid bruit or elevated JVD. Lungs:   Respiratory Effort: Normal   Auscultation: Clear to auscultation bilaterally, respirations unlabored. No wheeze, rales   Chest Wall:  No tenderness or deformity. Cardiovascular:    Pulses  Palpation: normal   Ascultation: Regular rate, S1/ S2 normal. No murmur, rub, or gallop. 2+ radial and pedal pulses, symmetric  Carotid  Femoral   Abdomen and Gastrointestinal:   Soft, non-tender, bowel sounds active. Liver and Spleen  Masses   Musculoskeletal: No muscle wasting  Back  Gait   Extremities: Extremities normal, atraumatic. No cyanosis or edema.  No cyanosis clubbing       Skin: Inspection and palpation performed, no rashes or lesions. Pysch: Normal mood and affect. Alert and oriented to time place person   Neurologic: Normal gross motor and sensory exam.       Labs: all labs have been reviewed      Lab Results   Component Value Date/Time    WBC 7.6 01/13/2022 10:59 AM    RBC 4.59 01/13/2022 10:59 AM    HGB 14.1 01/13/2022 10:59 AM    HCT 42.1 01/13/2022 10:59 AM    MCV 91.6 01/13/2022 10:59 AM    RDW 14.2 01/13/2022 10:59 AM     01/13/2022 10:59 AM     Lab Results   Component Value Date/Time     01/16/2021 04:36 AM    K 4.1 01/16/2021 04:36 AM    K 4.3 01/15/2021 03:41 AM     01/16/2021 04:36 AM    CO2 23 01/16/2021 04:36 AM    BUN 28 01/16/2021 04:36 AM    CREATININE 1.5 01/16/2021 04:36 AM    GFRAA 55 01/16/2021 04:36 AM    AGRATIO 1.3 01/16/2021 04:36 AM    LABGLOM 45 01/16/2021 04:36 AM    GLUCOSE 184 01/16/2021 04:36 AM    PROT 6.1 01/16/2021 04:36 AM    CALCIUM 9.8 01/16/2021 04:36 AM    BILITOT 0.4 01/16/2021 04:36 AM    ALKPHOS 85 01/16/2021 04:36 AM    AST 13 01/16/2021 04:36 AM    ALT 14 01/16/2021 04:36 AM     No results found for: PTINR  Lab Results   Component Value Date    LABA1C 7.4 01/15/2021     Lab Results   Component Value Date    TROPONINI <0.01 01/15/2021       Cardiac, Vascular and Imaging Data: All Personally Reviewed in Detail by Myself      EKG: 10/20/2022 Sinus rhythm     Echocardiogram:   ECHO 1/15/2021  There is concentric left ventricular hypertrophy. Ejection fraction is visually estimated to be 11-79%  Grade I diastolic dysfunction with normal LV filling pressures. Normal right ventricular size and function. Stress Test:     Cath:  [facility] 5/1/2016:  1. Left main trunk: It arises from the left sinus of Valsalva. It divides into left anterior descending artery and left circumflex artery. The left main trunk is free of atherosclerosis. 2.  Left anterior descending artery:   It is a moderate-sized artery and it descends into the intraventricular sulcus and wraps around the apex of the heart. The left main trunk is free of atherosclerosis. 3.  Left anterior descending artery: It is a moderate-sized artery. It descends into the intraventricular sulcus and wraps around the apex of the heart. The left anterior descending artery has about 50% stenosis in the mid to distal portion. There is also a 50% stenosis of the diagonal branch in the proximal segment. The lumen of the left anterior descending artery is free of any significant atherosclerosis. 4.  Left circumflex artery: It arises from the left main trunk. It is 100% occluded after a 1st obtuse marginal branch. The right coronary artery arises from right sinus on Valsalva. It is a dominant artery. It gives rise to a posterior descending and posterolateral branches. Right coronary artery has evidence of about 90% to 95% stenosis in the mid to distal portion of the PDA. There is also about 50% to 60% stenosis of the 2nd PDA in the midsegment, but it does not appear hemodynamically significant. 5.  Left ventriculogram reveals overall preserved left ventricular systolic function. Successful angioplasty followed by stent deployment in the mid circumflex artery using a drug-eluting stent, 100% stenosis reduced to 0% using 3.25 by 18 Alpine stent. Final diameter was 3.34 with 0% residual stenosis. SAMEER grade-3 flow was established. There appears to be a 40% to 50% stenosis of the distal circumflex branch beyond the area of the stenosis. There appears to be also a microembolization in a small distal circumflex branch.     Cardiac cath 12/26/19  Two-vessel stenting;  the mid RCA & the mid LAD   FFR of 0.75  LAD: 2.25 x 18 mm long jair DENA stent  Mid PDA: 2.25 x 12 mm long jair DENA stent  Lower diagonal branch was too small for stenting; will be treated medically    Other imaging:     Assessment and Plan     Atrial Fibrillation,paroxsymal    Primary Cardiologist: Dr Catracho Frank  Referring Physician: Dr Catracho Frank  Referring Reason: GI bleed    CHADSvasc is at least 7 (Age,HTN,CVA,DM,CAD)  HASbled is at least 3     He is a poor candidate for chronic anticoagulation with his history of GI bleed. He would be an appropriate candidate for the watchman device. Patient is agreeable to proceed with the Watchman procedure and instructed Amarilys Mendez, the coordinator will get in contact to facilitate scheduling. Specifically regarding risk of anticoagulation they have demonstrated:  History of bleeding (eg. Intracerebral, subdural, GI, retro-peritoneal)  Intolerance oral anticoagulation  Increased bleeding risk (e.g. Thrombocytopenia, anemia)  High risk of recurrent falls    We have discussed their unique stroke and bleeding risk both on and off oral-anticoagulation, and the rationale for this referral.  Based on both stroke and bleeding risk, a shared decision has been made to pursue closure of the left atrial appendage as an alternative to oral anticoagulant therapy for stroke prophylaxis and to reduce their long term risk of incidence of bleeding. Discussed Watchman LAAC at length with patient, heart model, device model and video. We gave educational materials. We discussed pre-procedure workup, timing of restarting AC, AC length, procedure and post procedure follow up/management. No Iodine Allergy. No Nickel/Titanium Allergy. He/She is agreeable to short term AC, proceeding with LAWRENCE, 2nd opinion/shared decision making  and will follow up with me post workup to discussing timing of the procedure. I had a detail discussion with the patient and family regarding the risk and benefit of the procedures. I explained to them the details of the procedure. I explained to them that the procedure will be performed under general anesthesia. I explained any risk of bleeding, pericardial effusion and tamponade, perforation of the vessel, stroke, myocardial infarction or death. The patient and family understood the risk and benefits and the details of procedure and would like to go ahead with the procedure. The patient gave informed consent. All questions and concerns answered at this time. Confirmed per patient before leaving the office. I have also asked her to call the office and speak with the [facility] with any questions or concerns moving forward. Thank you for allowing us to participate in the care of Millie Barba. Please do not hesitate to contact me if you have any questions. Romana Alarcon MD, MPH    [facility], [address] [place] [address]  Ph: [phone]  Fax: [phone]    Physician Attestation:  The scribes documentation has been prepared under my direction and personally reviewed by me in its entirety. I confirm that the note above accurately reflects all work, treatment, procedures, and medical decision making performed by me.

## 2022-10-27 RX ORDER — CHLORHEXIDINE GLUCONATE 213 G/1000ML
SOLUTION TOPICAL
Qty: 1 EACH | Refills: 0 | Status: ON HOLD
Start: 2022-10-27 | End: 2022-11-14 | Stop reason: HOSPADM

## 2022-10-27 NOTE — TELEPHONE ENCOUNTER
Spoke with patient today and informed him of the procedure that is being scheduled on 11/28/2022 at 7:30 AM arrival.  Lab work has been order instructed to have done at [facility] on 11/21/2022. Also informed to get Hibiclens bath at their Pharmacy. All procedure instructions were e-mailed to patient.  Instructed to contact me with any questions prior to procedure

## 2022-10-27 NOTE — TELEPHONE ENCOUNTER
9/22/22 referred for Watchman procedure.   Dr. Hortencia Lake consulted on 10/20/2022  Dr. Odilia Buchanan shared decision on 9/22/2022  LAWRENCE to be done the day of the procedure  Insurance approved per [facility]

## 2022-11-07 ENCOUNTER — HOSPITAL ENCOUNTER (OUTPATIENT)
Age: 77
Discharge: HOME | End: 2022-11-07
Payer: MEDICARE

## 2022-11-07 DIAGNOSIS — I48.0 PAROXYSMAL A-FIB (HCC): ICD-10-CM

## 2022-11-07 DIAGNOSIS — Z01.818 PRE-OP TESTING: ICD-10-CM

## 2022-11-07 LAB
ABO/RH: NORMAL
ANION GAP SERPL CALCULATED.3IONS-SCNC: 10 MMOL/L (ref 3–16)
ANTIBODY SCREEN: NORMAL
BACTERIA: NORMAL /HPF
BILIRUBIN URINE: NEGATIVE
BLOOD, URINE: NEGATIVE
BUN BLDV-MCNC: 26 MG/DL (ref 7–20)
CALCIUM SERPL-MCNC: 9.9 MG/DL (ref 8.3–10.6)
CHLORIDE BLD-SCNC: 107 MMOL/L (ref 99–110)
CLARITY: CLEAR
CO2: 23 MMOL/L (ref 21–32)
COLOR: YELLOW
CREAT SERPL-MCNC: 1.8 MG/DL (ref 0.8–1.3)
EPITHELIAL CELLS, UA: 1 /HPF (ref 0–5)
GFR SERPL CREATININE-BSD FRML MDRD: 38 ML/MIN/{1.73_M2}
GLUCOSE BLD-MCNC: 128 MG/DL (ref 70–99)
GLUCOSE URINE: 100 MG/DL
HCT VFR BLD CALC: 43.1 % (ref 40.5–52.5)
HEMOGLOBIN: 14.2 G/DL (ref 13.5–17.5)
HYALINE CASTS: 6 /LPF (ref 0–8)
KETONES, URINE: ABNORMAL MG/DL
LEUKOCYTE ESTERASE, URINE: NEGATIVE
MCH RBC QN AUTO: 31 PG (ref 26–34)
MCHC RBC AUTO-ENTMCNC: 32.9 G/DL (ref 31–36)
MCV RBC AUTO: 94.2 FL (ref 80–100)
MICROSCOPIC EXAMINATION: YES
NITRITE, URINE: NEGATIVE
PDW BLD-RTO: 14.1 % (ref 12.4–15.4)
PH UA: 5.5 (ref 5–8)
PLATELET # BLD: 208 K/UL (ref 135–450)
PMV BLD AUTO: 6.7 FL (ref 5–10.5)
POTASSIUM SERPL-SCNC: 4.5 MMOL/L (ref 3.5–5.1)
PROTEIN UA: 30 MG/DL
RBC # BLD: 4.57 M/UL (ref 4.2–5.9)
RBC UA: 0 /HPF (ref 0–4)
SODIUM BLD-SCNC: 140 MMOL/L (ref 136–145)
SPECIFIC GRAVITY UA: 1.02 (ref 1–1.03)
URINE TYPE: ABNORMAL
UROBILINOGEN, URINE: 1 E.U./DL
WBC # BLD: 7.2 K/UL (ref 4–11)
WBC UA: 1 /HPF (ref 0–5)

## 2022-11-07 PROCEDURE — 85027 COMPLETE CBC AUTOMATED: CPT

## 2022-11-07 PROCEDURE — 80048 BASIC METABOLIC PNL TOTAL CA: CPT

## 2022-11-07 PROCEDURE — 81001 URINALYSIS AUTO W/SCOPE: CPT

## 2022-11-07 PROCEDURE — 36415 COLL VENOUS BLD VENIPUNCTURE: CPT

## 2022-11-07 PROCEDURE — 86900 BLOOD TYPING SEROLOGIC ABO: CPT

## 2022-11-07 PROCEDURE — 86901 BLOOD TYPING SEROLOGIC RH(D): CPT

## 2022-11-07 PROCEDURE — 86850 RBC ANTIBODY SCREEN: CPT

## 2022-11-09 RX ORDER — METHYLPREDNISOLONE SODIUM SUCCINATE 125 MG/2ML
125 INJECTION, POWDER, LYOPHILIZED, FOR SOLUTION INTRAMUSCULAR; INTRAVENOUS ONCE
Status: DISCONTINUED | OUTPATIENT
Start: 2022-11-14 | End: 2022-11-14 | Stop reason: HOSPADM

## 2022-11-14 ENCOUNTER — HOSPITAL ENCOUNTER (INPATIENT)
Dept: CARDIAC CATH/INVASIVE PROCEDURES | Age: 77
LOS: 1 days | Discharge: HOME | DRG: 274 | End: 2022-11-14
Attending: INTERNAL MEDICINE | Admitting: INTERNAL MEDICINE
Payer: MEDICARE

## 2022-11-14 ENCOUNTER — ANESTHESIA EVENT (OUTPATIENT)
Dept: CARDIAC CATH/INVASIVE PROCEDURES | Age: 77
DRG: 274 | End: 2022-11-14
Payer: MEDICARE

## 2022-11-14 ENCOUNTER — ANESTHESIA (OUTPATIENT)
Dept: CARDIAC CATH/INVASIVE PROCEDURES | Age: 77
DRG: 274 | End: 2022-11-14
Payer: MEDICARE

## 2022-11-14 VITALS
DIASTOLIC BLOOD PRESSURE: 72 MMHG | SYSTOLIC BLOOD PRESSURE: 148 MMHG | HEIGHT: 69 IN | RESPIRATION RATE: 15 BRPM | WEIGHT: 187 LBS | TEMPERATURE: 97.4 F | BODY MASS INDEX: 27.7 KG/M2 | HEART RATE: 63 BPM | OXYGEN SATURATION: 90 %

## 2022-11-14 PROBLEM — I48.0 PAROXYSMAL ATRIAL FIBRILLATION (HCC): Status: ACTIVE | Noted: 2022-11-14

## 2022-11-14 PROBLEM — I48.0 PAROXYSMAL A-FIB (HCC): Status: ACTIVE | Noted: 2022-11-14

## 2022-11-14 LAB
ABO/RH: NORMAL
ANION GAP SERPL CALCULATED.3IONS-SCNC: 7 MMOL/L (ref 3–16)
ANTIBODY SCREEN: NORMAL
BUN BLDV-MCNC: 29 MG/DL (ref 7–20)
CALCIUM SERPL-MCNC: 8.9 MG/DL (ref 8.3–10.6)
CHLORIDE BLD-SCNC: 110 MMOL/L (ref 99–110)
CO2: 24 MMOL/L (ref 21–32)
CREAT SERPL-MCNC: 2.1 MG/DL (ref 0.8–1.3)
GFR SERPL CREATININE-BSD FRML MDRD: 31 ML/MIN/{1.73_M2}
GLUCOSE BLD-MCNC: 77 MG/DL (ref 70–99)
HCT VFR BLD CALC: 33.9 % (ref 40.5–52.5)
HEMOGLOBIN: 11.3 G/DL (ref 13.5–17.5)
LV EF: 55 %
LVEF MODALITY: NORMAL
MCH RBC QN AUTO: 31 PG (ref 26–34)
MCHC RBC AUTO-ENTMCNC: 33.4 G/DL (ref 31–36)
MCV RBC AUTO: 92.8 FL (ref 80–100)
PDW BLD-RTO: 14.1 % (ref 12.4–15.4)
PLATELET # BLD: 183 K/UL (ref 135–450)
PMV BLD AUTO: 6.9 FL (ref 5–10.5)
POC ACT LR: 351 SEC
POC ACT LR: 351 SEC
POTASSIUM SERPL-SCNC: 4.2 MMOL/L (ref 3.5–5.1)
RBC # BLD: 3.66 M/UL (ref 4.2–5.9)
SODIUM BLD-SCNC: 141 MMOL/L (ref 136–145)
WBC # BLD: 7.7 K/UL (ref 4–11)

## 2022-11-14 PROCEDURE — 85347 COAGULATION TIME ACTIVATED: CPT

## 2022-11-14 PROCEDURE — 93005 ELECTROCARDIOGRAM TRACING: CPT | Performed by: INTERNAL MEDICINE

## 2022-11-14 PROCEDURE — 2500000003 HC RX 250 WO HCPCS: Performed by: NURSE ANESTHETIST, CERTIFIED REGISTERED

## 2022-11-14 PROCEDURE — B24BZZ4 ULTRASONOGRAPHY OF HEART WITH AORTA, TRANSESOPHAGEAL: ICD-10-PCS | Performed by: INTERNAL MEDICINE

## 2022-11-14 PROCEDURE — 36415 COLL VENOUS BLD VENIPUNCTURE: CPT

## 2022-11-14 PROCEDURE — C1889 IMPLANT/INSERT DEVICE, NOC: HCPCS

## 2022-11-14 PROCEDURE — 7100000011 HC PHASE II RECOVERY - ADDTL 15 MIN

## 2022-11-14 PROCEDURE — 7100000001 HC PACU RECOVERY - ADDTL 15 MIN

## 2022-11-14 PROCEDURE — 2720000010 HC SURG SUPPLY STERILE

## 2022-11-14 PROCEDURE — 93308 TTE F-UP OR LMTD: CPT

## 2022-11-14 PROCEDURE — 86850 RBC ANTIBODY SCREEN: CPT

## 2022-11-14 PROCEDURE — 2580000003 HC RX 258: Performed by: NURSE ANESTHETIST, CERTIFIED REGISTERED

## 2022-11-14 PROCEDURE — 33340 PERQ CLSR TCAT L ATR APNDGE: CPT

## 2022-11-14 PROCEDURE — 2709999900 HC NON-CHARGEABLE SUPPLY

## 2022-11-14 PROCEDURE — 6360000002 HC RX W HCPCS: Performed by: NURSE ANESTHETIST, CERTIFIED REGISTERED

## 2022-11-14 PROCEDURE — C1894 INTRO/SHEATH, NON-LASER: HCPCS

## 2022-11-14 PROCEDURE — C1760 CLOSURE DEV, VASC: HCPCS

## 2022-11-14 PROCEDURE — 93355 ECHO TRANSESOPHAGEAL (TEE): CPT

## 2022-11-14 PROCEDURE — 02L73DK OCCLUSION OF LEFT ATRIAL APPENDAGE WITH INTRALUMINAL DEVICE, PERCUTANEOUS APPROACH: ICD-10-PCS | Performed by: INTERNAL MEDICINE

## 2022-11-14 PROCEDURE — 33340 PERQ CLSR TCAT L ATR APNDGE: CPT | Performed by: INTERNAL MEDICINE

## 2022-11-14 PROCEDURE — 85027 COMPLETE CBC AUTOMATED: CPT

## 2022-11-14 PROCEDURE — 7100000000 HC PACU RECOVERY - FIRST 15 MIN

## 2022-11-14 PROCEDURE — 3700000001 HC ADD 15 MINUTES (ANESTHESIA)

## 2022-11-14 PROCEDURE — C1893 INTRO/SHEATH, FIXED,NON-PEEL: HCPCS

## 2022-11-14 PROCEDURE — 1200000000 HC SEMI PRIVATE

## 2022-11-14 PROCEDURE — 7100000010 HC PHASE II RECOVERY - FIRST 15 MIN

## 2022-11-14 PROCEDURE — 80048 BASIC METABOLIC PNL TOTAL CA: CPT

## 2022-11-14 PROCEDURE — 86900 BLOOD TYPING SEROLOGIC ABO: CPT

## 2022-11-14 PROCEDURE — 86901 BLOOD TYPING SEROLOGIC RH(D): CPT

## 2022-11-14 PROCEDURE — 3700000000 HC ANESTHESIA ATTENDED CARE

## 2022-11-14 PROCEDURE — 6360000002 HC RX W HCPCS: Performed by: INTERNAL MEDICINE

## 2022-11-14 RX ORDER — ASPIRIN 81 MG/1
81 TABLET ORAL DAILY
Qty: 90 TABLET | Refills: 1 | Status: SHIPPED | OUTPATIENT
Start: 2022-11-14

## 2022-11-14 RX ORDER — SODIUM CHLORIDE 9 MG/ML
INJECTION, SOLUTION INTRAVENOUS CONTINUOUS
Status: DISCONTINUED | OUTPATIENT
Start: 2022-11-14 | End: 2022-11-14 | Stop reason: HOSPADM

## 2022-11-14 RX ORDER — SODIUM CHLORIDE 0.9 % (FLUSH) 0.9 %
5-40 SYRINGE (ML) INJECTION EVERY 12 HOURS SCHEDULED
Status: DISCONTINUED | OUTPATIENT
Start: 2022-11-14 | End: 2022-11-14 | Stop reason: HOSPADM

## 2022-11-14 RX ORDER — SODIUM CHLORIDE 9 MG/ML
INJECTION, SOLUTION INTRAVENOUS CONTINUOUS PRN
Status: DISCONTINUED | OUTPATIENT
Start: 2022-11-14 | End: 2022-11-14 | Stop reason: SDUPTHER

## 2022-11-14 RX ORDER — HEPARIN SODIUM 1000 [USP'U]/ML
INJECTION, SOLUTION INTRAVENOUS; SUBCUTANEOUS PRN
Status: DISCONTINUED | OUTPATIENT
Start: 2022-11-14 | End: 2022-11-14 | Stop reason: SDUPTHER

## 2022-11-14 RX ORDER — PROPOFOL 10 MG/ML
INJECTION, EMULSION INTRAVENOUS PRN
Status: DISCONTINUED | OUTPATIENT
Start: 2022-11-14 | End: 2022-11-14 | Stop reason: SDUPTHER

## 2022-11-14 RX ORDER — ONDANSETRON 2 MG/ML
INJECTION INTRAMUSCULAR; INTRAVENOUS PRN
Status: DISCONTINUED | OUTPATIENT
Start: 2022-11-14 | End: 2022-11-14 | Stop reason: SDUPTHER

## 2022-11-14 RX ORDER — MAGNESIUM SULFATE HEPTAHYDRATE 500 MG/ML
INJECTION, SOLUTION INTRAMUSCULAR; INTRAVENOUS PRN
Status: DISCONTINUED | OUTPATIENT
Start: 2022-11-14 | End: 2022-11-14 | Stop reason: SDUPTHER

## 2022-11-14 RX ORDER — ASPIRIN 81 MG/1
81 TABLET, CHEWABLE ORAL DAILY
Status: DISCONTINUED | OUTPATIENT
Start: 2022-11-15 | End: 2022-11-14 | Stop reason: HOSPADM

## 2022-11-14 RX ORDER — SUCCINYLCHOLINE/SOD CL,ISO/PF 200MG/10ML
SYRINGE (ML) INTRAVENOUS PRN
Status: DISCONTINUED | OUTPATIENT
Start: 2022-11-14 | End: 2022-11-14 | Stop reason: SDUPTHER

## 2022-11-14 RX ORDER — ACETAMINOPHEN 325 MG/1
650 TABLET ORAL EVERY 4 HOURS PRN
Status: DISCONTINUED | OUTPATIENT
Start: 2022-11-14 | End: 2022-11-14 | Stop reason: HOSPADM

## 2022-11-14 RX ORDER — ROCURONIUM BROMIDE 10 MG/ML
INJECTION, SOLUTION INTRAVENOUS PRN
Status: DISCONTINUED | OUTPATIENT
Start: 2022-11-14 | End: 2022-11-14 | Stop reason: SDUPTHER

## 2022-11-14 RX ORDER — PROTAMINE SULFATE 10 MG/ML
INJECTION, SOLUTION INTRAVENOUS PRN
Status: DISCONTINUED | OUTPATIENT
Start: 2022-11-14 | End: 2022-11-14 | Stop reason: SDUPTHER

## 2022-11-14 RX ORDER — LISINOPRIL 5 MG/1
5 TABLET ORAL DAILY
Status: DISCONTINUED | OUTPATIENT
Start: 2022-11-14 | End: 2022-11-14 | Stop reason: HOSPADM

## 2022-11-14 RX ORDER — LIDOCAINE HYDROCHLORIDE 20 MG/ML
INJECTION, SOLUTION EPIDURAL; INFILTRATION; INTRACAUDAL; PERINEURAL PRN
Status: DISCONTINUED | OUTPATIENT
Start: 2022-11-14 | End: 2022-11-14 | Stop reason: SDUPTHER

## 2022-11-14 RX ORDER — FENTANYL CITRATE 50 UG/ML
INJECTION, SOLUTION INTRAMUSCULAR; INTRAVENOUS PRN
Status: DISCONTINUED | OUTPATIENT
Start: 2022-11-14 | End: 2022-11-14 | Stop reason: SDUPTHER

## 2022-11-14 RX ORDER — SODIUM CHLORIDE 0.9 % (FLUSH) 0.9 %
5-40 SYRINGE (ML) INJECTION PRN
Status: DISCONTINUED | OUTPATIENT
Start: 2022-11-14 | End: 2022-11-14 | Stop reason: HOSPADM

## 2022-11-14 RX ORDER — DIPHENHYDRAMINE HYDROCHLORIDE 50 MG/ML
25 INJECTION INTRAMUSCULAR; INTRAVENOUS ONCE
Status: DISCONTINUED | OUTPATIENT
Start: 2022-11-14 | End: 2022-11-14 | Stop reason: HOSPADM

## 2022-11-14 RX ORDER — SODIUM CHLORIDE 9 MG/ML
INJECTION, SOLUTION INTRAVENOUS PRN
Status: DISCONTINUED | OUTPATIENT
Start: 2022-11-14 | End: 2022-11-14 | Stop reason: HOSPADM

## 2022-11-14 RX ADMIN — CEFAZOLIN 2000 MG: 10 INJECTION, POWDER, FOR SOLUTION INTRAVENOUS at 09:03

## 2022-11-14 RX ADMIN — FENTANYL CITRATE 25 MCG: 50 INJECTION, SOLUTION INTRAMUSCULAR; INTRAVENOUS at 09:17

## 2022-11-14 RX ADMIN — PROPOFOL 120 MG: 10 INJECTION, EMULSION INTRAVENOUS at 09:06

## 2022-11-14 RX ADMIN — Medication 180 MG: at 09:06

## 2022-11-14 RX ADMIN — MAGNESIUM SULFATE HEPTAHYDRATE 1 G: 500 INJECTION, SOLUTION INTRAMUSCULAR; INTRAVENOUS at 09:20

## 2022-11-14 RX ADMIN — PROTAMINE SULFATE 10 MG: 10 INJECTION, SOLUTION INTRAVENOUS at 09:46

## 2022-11-14 RX ADMIN — SODIUM CHLORIDE: 9 INJECTION, SOLUTION INTRAVENOUS at 08:59

## 2022-11-14 RX ADMIN — SUGAMMADEX 200 MG: 100 INJECTION, SOLUTION INTRAVENOUS at 09:47

## 2022-11-14 RX ADMIN — ONDANSETRON 4 MG: 2 INJECTION INTRAMUSCULAR; INTRAVENOUS at 09:29

## 2022-11-14 RX ADMIN — LIDOCAINE HYDROCHLORIDE 100 MG: 20 INJECTION, SOLUTION EPIDURAL; INFILTRATION; INTRACAUDAL; PERINEURAL at 09:06

## 2022-11-14 RX ADMIN — ROCURONIUM BROMIDE 10 MG: 10 INJECTION, SOLUTION INTRAVENOUS at 09:06

## 2022-11-14 RX ADMIN — ROCURONIUM BROMIDE 40 MG: 10 INJECTION, SOLUTION INTRAVENOUS at 09:17

## 2022-11-14 RX ADMIN — PROTAMINE SULFATE 30 MG: 10 INJECTION, SOLUTION INTRAVENOUS at 09:53

## 2022-11-14 RX ADMIN — FENTANYL CITRATE 50 MCG: 50 INJECTION, SOLUTION INTRAMUSCULAR; INTRAVENOUS at 09:06

## 2022-11-14 RX ADMIN — HEPARIN SODIUM 4000 UNITS: 1000 INJECTION INTRAVENOUS; SUBCUTANEOUS at 09:31

## 2022-11-14 RX ADMIN — HEPARIN SODIUM 5000 UNITS: 1000 INJECTION INTRAVENOUS; SUBCUTANEOUS at 09:36

## 2022-11-14 ASSESSMENT — ENCOUNTER SYMPTOMS: SHORTNESS OF BREATH: 1

## 2022-11-14 NOTE — PROGRESS NOTES
Pt awake and oriented. R groin soft CD&I, vss, NSR on tele, Echo complete. Dr Suraj Angeles at bedside, ok to transfer to phase 2. Phase 1 discharge complete. Orders placed for pt to have labs drawn in a wk.

## 2022-11-14 NOTE — H&P
Reason for Referral: Left atrial appendage closure     CC: \"I have had bleeding issues. \"        Subjective:      History of Present Illness:     Moni Rivas is a 76 y.o. patient with a PMH significant for CAD s/p PCI, HTN, and DM who presents for management of CAD. He was hospitalized 5/1-5/3/2016 after presenting to [facility] with left arm pain and chest pressure and was found to have acute inferior wall MI.  cardiac cath which revealed a 100% mid Lcx stenosis that was successfully intervened with DENA placed. He has residual disease in his LAD, diagnoal & distal RCA. He was admitted 1/16/21 to [facility] from [facility] with slurred speech, confusion, and syncope. His MRI showed nothing acute but multiple old infarcts. He was seen by Dr. Paul Blackmon for elevated troponin which did not require any further inpatient cardiac work up. While on telemetry, a short run of Afib was noted and he was started on Amiodarone and Eliquis 5 mg bid. He reported the Eliquis was causing him to have bright red rectal bleeding. Holding his Eliquis for a couple days improved his rectal bleeding but he continued to experience rectal bleeding about once monthly. 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 He was admitted 9/15/22 with stroke like symptoms. His CT and MRI were negative for any acute findings but bilateral subdural hygromas were found. He reported taking his Eliquis only 5 times a week. Patient is being referred for Left Atrial Appendage Closure with WATCHMAN device for management of stroke risk resulting from non-valvular atrial fibrillation. Based on their past history, it has been determined that they are poor candidates for long-term oral-anticoagulation, however may be tolerant of short term treatment with warfarin as necessary.          Past Medical History:   has a past medical history of Asthma, CAD (coronary artery disease), Carotid stenosis, right, Chronic diastolic congestive heart failure ([facility] CKD (chronic kidney disease), CVA (cerebral vascular accident) ([facility] [facility] Diabetes mellitus ([facility] [facility] History of MI (myocardial infarction), [place] (hard of hearing), HTN (hypertension), Hyperlipidemia, Melanoma ([facility] [facility] PAF (paroxysmal atrial fibrillation) ([facility] [address] Prostate CA ([facility] Rectal bleed, and Syncope. Surgical History:   has a past surgical history that includes hernia repair (Bilateral, distant past); shoulder surgery (Bilateral, 10-12 years ago); Coronary angioplasty with stent (05/01/2016); Cystocopy (08/22/2018); Colonoscopy; Colonoscopy (N/A, 10/19/2022); and Colonoscopy (10/19/2022). Social History:   reports that he quit smoking about 36 years ago. His smoking use included cigarettes. He has never used smokeless tobacco. He reports that he does not drink alcohol and does not use drugs. Family History:  family history includes No Known Problems in his father and mother. Home Medications:  Were reviewed and are listed in nursing record and/or below          Prior to Admission medications    Medication Sig Start Date End Date Taking?  Authorizing Provider   ergocalciferol (ERGOCALCIFEROL) 1.25 MG (99390 UT) capsule TAKE 1 CAPSULE BY MOUTH TWICE PER WEEK 9/16/22   Yes Historical Provider, MD   Mirabegron (MYRBETRIQ PO) Take by mouth daily     Yes Historical Provider, MD   apixaban (ELIQUIS) 5 MG TABS tablet Take by mouth 2 times daily     Yes Historical Provider, MD   amiodarone (CORDARONE) 200 MG tablet TAKE ONE TABLET BY MOUTH DAILY 9/20/22   Yes Isra King MD   amLODIPine (NORVASC) 2.5 MG tablet TAKE ONE TABLET BY MOUTH DAILY 8/21/22   Yes Jose Alberto Ramachandran MD   lisinopril (PRINIVIL;ZESTRIL) 5 MG tablet TAKE ONE TABLET BY MOUTH DAILY 8/21/22   Yes Jose Alberto Ramachandran MD   TRULICITY 1.5 [place]/8.0SA SOPN once a week On sunday 4/30/22   Yes Historical Provider, MD   triamcinolone (KENALOG) 0.1 % cream Apply topically 2 times daily 9/30/21   Yes Historical Provider, MD insulin lispro (HUMALOG) 100 UNIT/ML injection vial Inject into the skin 3 times daily (before meals) 12 units with breakfast  14 units with lunch  16 units with dinner     Yes Historical Provider, MD   loratadine (CLARITIN) 10 MG tablet Take 10 mg by mouth daily [employer]     Yes Historical Provider, MD   insulin glargine (LANTUS) 100 UNIT/ML injection vial Inject 48 Units into the skin daily     Yes Historical Provider, MD         CURRENT Medications:  No current facility-administered medications for this visit. Allergies:  Doxycycline monohydrate, Bactrim [sulfamethoxazole-trimethoprim], and Tetracyclines & related               Review of Systems: All reviewed and refer to HPI  Constitutional: no unanticipated weight loss. There's been no change in energy level, sleep pattern, or activity level. No fevers, chills. Eyes: No visual changes or diplopia. No scleral icterus. ENT: No Headaches, hearing loss or vertigo. No mouth sores or sore throat. Cardiovascular: No Chest pain, tightness or discomfort. No Shortness of breath. No Dyspnea on exertion, Orthopnea, Paroxysmal nocturnal dyspnea or breathlessness at rest.  No Palpitations. No Syncope ('blackouts', 'faints', 'collapse') or dizziness. Respiratory: No cough or wheezing, no sputum production. No hematemesis. Gastrointestinal: No abdominal pain, appetite loss, blood in stools. No change in bowel or bladder habits. Genitourinary: No dysuria, trouble voiding, or hematuria. Musculoskeletal:  No gait disturbance, no joint complaints. Integumentary: No rash or pruritis. Neurological: No headache, diplopia, change in muscle strength, numbness or tingling. Psychiatric: No anxiety or depression. Endocrine: No temperature intolerance. No excessive thirst, fluid intake, or urination. No tremor. Hematologic/Lymphatic: No abnormal bruising or bleeding, blood clots or swollen lymph nodes.   Allergic/Immunologic: No nasal congestion or hives.        Objective: all reveiwed       PHYSICAL EXAM:           Vitals:     10/20/22 1230   BP: 134/70   Pulse: 80   SpO2: 98%    Weight: 187 lb (84.8 kg)       General Appearance:  Alert, cooperative, no distress, appears stated age. Head:  Normocephalic, without obvious abnormality, atraumatic. Eyes:  Pupils equal and round. No scleral icterus. Mouth: Moist mucosa, no pharyngeal erythema. Nose: Nares normal. No drainage or sinus tenderness. Neck: Supple, symmetrical, trachea midline. No adenopathy. No tenderness/mass/nodules. No carotid bruit or elevated JVD. Lungs:   Respiratory Effort: Normal   Auscultation: Clear to auscultation bilaterally, respirations unlabored. No wheeze, rales   Chest Wall:  No tenderness or deformity. Cardiovascular:     Pulses  Palpation: normal   Ascultation: Regular rate, S1/ S2 normal. No murmur, rub, or gallop. 2+ radial and pedal pulses, symmetric  Carotid  Femoral   Abdomen and Gastrointestinal:   Soft, non-tender, bowel sounds active. Liver and Spleen  Masses   Musculoskeletal: No muscle wasting  Back  Gait   Extremities: Extremities normal, atraumatic. No cyanosis or edema. No cyanosis clubbing         Skin: Inspection and palpation performed, no rashes or lesions. Pysch: Normal mood and affect.  Alert and oriented to time place person   Neurologic: Normal gross motor and sensory exam.       Labs: all labs have been reviewed             Lab Results   Component Value Date/Time     WBC 7.6 01/13/2022 10:59 AM     RBC 4.59 01/13/2022 10:59 AM     HGB 14.1 01/13/2022 10:59 AM     HCT 42.1 01/13/2022 10:59 AM     MCV 91.6 01/13/2022 10:59 AM     RDW 14.2 01/13/2022 10:59 AM      01/13/2022 10:59 AM            Lab Results   Component Value Date/Time      01/16/2021 04:36 AM     K 4.1 01/16/2021 04:36 AM     K 4.3 01/15/2021 03:41 AM      01/16/2021 04:36 AM     CO2 23 01/16/2021 04:36 AM     BUN 28 01/16/2021 04:36 AM     CREATININE 1.5 01/16/2021 04:36 AM     GFRAA 55 01/16/2021 04:36 AM     AGRATIO 1.3 01/16/2021 04:36 AM     LABGLOM 45 01/16/2021 04:36 AM     GLUCOSE 184 01/16/2021 04:36 AM     PROT 6.1 01/16/2021 04:36 AM     CALCIUM 9.8 01/16/2021 04:36 AM     BILITOT 0.4 01/16/2021 04:36 AM     ALKPHOS 85 01/16/2021 04:36 AM     AST 13 01/16/2021 04:36 AM     ALT 14 01/16/2021 04:36 AM      No results found for: PTINR        Lab Results   Component Value Date     LABA1C 7.4 01/15/2021            Lab Results   Component Value Date     TROPONINI <0.01 01/15/2021         Cardiac, Vascular and Imaging Data: All Personally Reviewed in Detail by Myself       EKG: 10/20/2022 Sinus rhythm      Echocardiogram:   ECHO 1/15/2021  There is concentric left ventricular hypertrophy. Ejection fraction is visually estimated to be 49-12%  Grade I diastolic dysfunction with normal LV filling pressures. Normal right ventricular size and function. Stress Test:      Cath:  [facility] 5/1/2016:  1. Left main trunk: It arises from the left sinus of Valsalva. It divides into left anterior descending artery and left circumflex artery. The left main trunk is free of atherosclerosis. 2.  Left anterior descending artery: It is a moderate-sized artery and it descends into the intraventricular sulcus and wraps around the apex of the heart. The left main trunk is free of atherosclerosis. 3.  Left anterior descending artery: It is a moderate-sized artery. It descends into the intraventricular sulcus and wraps around the apex of the heart. The left anterior descending artery has about 50% stenosis in the mid to distal portion. There is also a 50% stenosis of the diagonal branch in the proximal segment. The lumen of the left anterior descending artery is free of any significant atherosclerosis. 4.  Left circumflex artery: It arises from the left main trunk. It is 100% occluded after a 1st obtuse marginal branch.   The right coronary artery arises from right sinus on Valsalva. It is a dominant artery. It gives rise to a posterior descending and posterolateral branches. Right coronary artery has evidence of about 90% to 95% stenosis in the mid to distal portion of the PDA. There is also about 50% to 60% stenosis of the 2nd PDA in the midsegment, but it does not appear hemodynamically significant. 5.  Left ventriculogram reveals overall preserved left ventricular systolic function. Successful angioplasty followed by stent deployment in the mid circumflex artery using a drug-eluting stent, 100% stenosis reduced to 0% using 3.25 by 18 Alpine stent. Final diameter was 3.34 with 0% residual stenosis. SAMEER grade-3 flow was established. There appears to be a 40% to 50% stenosis of the distal circumflex branch beyond the area of the stenosis. There appears to be also a microembolization in a small distal circumflex branch. Cardiac cath 12/26/19  Two-vessel stenting;  the mid RCA & the mid LAD   FFR of 0.75  LAD: 2.25 x 18 mm long jair DENA stent  Mid PDA: 2.25 x 12 mm long jair DENA stent  Lower diagonal branch was too small for stenting; will be treated medically     Other imaging:      Assessment and Plan      Atrial Fibrillation,paroxsymal     Primary Cardiologist: Dr Cristian Aranda  Referring Physician: Dr Cristian Aranda  Referring Reason: GI bleed     CHADSvasc is at least 7 (Age,HTN,CVA,DM,CAD)  HASbled is at least 3      He is a poor candidate for chronic anticoagulation with his history of GI bleed. He would be an appropriate candidate for the watchman device. Patient is agreeable to proceed with the Watchman procedure and instructed Eugenio Ahumada, the coordinator will get in contact to facilitate scheduling. Specifically regarding risk of anticoagulation they have demonstrated:  History of bleeding (eg.  Intracerebral, subdural, GI, retro-peritoneal)  Intolerance oral anticoagulation  Increased bleeding risk (e.g. Thrombocytopenia, anemia)  High risk of recurrent falls We have discussed their unique stroke and bleeding risk both on and off oral-anticoagulation, and the rationale for this referral.  Based on both stroke and bleeding risk, a shared decision has been made to pursue closure of the left atrial appendage as an alternative to oral anticoagulant therapy for stroke prophylaxis and to reduce their long term risk of incidence of bleeding. Discussed Watchman LAAC at length with patient, heart model, device model and video. We gave educational materials. We discussed pre-procedure workup, timing of restarting AC, AC length, procedure and post procedure follow up/management. No Iodine Allergy. No Nickel/Titanium Allergy. He/She is agreeable to short term AC, proceeding with LAWRENCE, 2nd opinion/shared decision making  and will follow up with me post workup to discussing timing of the procedure. I had a detail discussion with the patient and family members regarding the risk and benefit of the procedures. I explained to patient and family members the details of the procedure. I explained to her that the procedure will be performed under general anesthesia. Also explained to them that a LAWRENCE will be performed. Details of the procedure was discussed in great length. All alternatives to left atrial appendage closure were discussed. I explained any risk of bleeding, pericardial effusion and tamponade, perforation of the vessel, stroke, myocardial infarction or death. Discussed in depth risk of pericardial effusion, pericardial tamponade, device dislodgement and embolization and need for sternotomy and cardiac surgery. I have discussed and explained the nature, purpose, benefits, risks and alternatives to the planned procedure. I have presented reasonable alternatives to the patient's proposed care, treatment, and services.  The discussion I have done encompassed risks, benefits, and side effects related to the alternatives and the risks related to not receiving the proposed care, treatment, and services. The patient and family members understood the risk and benefits and the details of procedure and would like to go ahead with the procedure. The patient gave informed consent. The patient has granted written consent to the procedure(s) and wishes to proceed. Thank you for allowing us to participate in the care of Dolores Salvador. Please do not hesitate to contact me if you have any questions.      Anabella Romero MD, MPH

## 2022-11-14 NOTE — ANESTHESIA POSTPROCEDURE EVALUATION
Department of Anesthesiology  Postprocedure Note    Patient: Shayy Vicente  MRN: [id number]  YOB: 1945  Date of evaluation: 11/14/2022      Procedure Summary     Date: 11/14/22 Room / Location: [facility] Cath Lab; [facility] Echocardiography    Anesthesia Start: 0901 Anesthesia Stop: 8028    Procedure: ECHO LAWRENCE IN CARDIAC CATH Diagnosis: Paroxysmal atrial fibrillation    Scheduled Providers:  Responsible Provider: Giovanny Rosenbaum MD    Anesthesia Type: general ASA Status: 3          Anesthesia Type: No value filed.     Kendal Phase I: Kendal Score: 8    Kendal Phase II:        Anesthesia Post Evaluation    Patient location during evaluation: PACU  Patient participation: complete - patient participated  Level of consciousness: awake and alert  Airway patency: patent  Nausea & Vomiting: no nausea and no vomiting  Complications: no  Cardiovascular status: blood pressure returned to baseline  Respiratory status: acceptable  Hydration status: euvolemic  Multimodal analgesia pain management approach

## 2022-11-14 NOTE — DISCHARGE SUMMARY
Patient is status post left atrial appendage closure. Successful closure of left atrial appendage with watchman device 27 mm. Admission discharge diagnosis:    Paroxysmal atrial fibrillation      Condition on discharge good  Vital signs are stable  No bleeding complication  Echocardiogram does not show any pericardial effusion.         [address] [place] [address] SUMMARY      Patient ID:  Shayy Vicente  [id number] 77 y.o. 1945    Admit date: 11/14/2022    Admitting Physician: Tayler Sevilla MD     Discharge MD: Tayler Sevilla MD     Admission Diagnoses: Paroxysmal atrial fibrillation [I48.0]  Paroxysmal A-fib [facility]) [I48.0]    Discharge Diagnoses:   Patient Active Problem List   Diagnosis    Acute transmural inferior wall MI [facility])    Essential hypertension    Hypokalemia    Type 2 diabetes mellitus with complication (HCC)    SOB (shortness of breath)    Coronary artery disease involving native coronary artery of native heart without angina pectoris    History of ST elevation myocardial infarction (STEMI)    Pure hypercholesterolemia    Pure hypercholesterolemia    Prostate cancer ([facility]    Retention, urine    S/P left heart catheterization by percutaneous approach    NSTEMI (non-ST elevated myocardial infarction) ([facility]    TIA (transient ischemic attack)    FLOR (acute kidney injury) ([facility]    Syncope    Chronic diastolic congestive heart failure (HCC)    PAF (paroxysmal atrial fibrillation) (HCC)    PAD (peripheral artery disease) ([facility])    Stage 3b chronic kidney disease (HCC)    Stage 3a chronic kidney disease ([facility] [address]    Carotid stenosis, right    Paroxysmal atrial fibrillation (HCC)    Paroxysmal A-fib ([facility]        Discharged Condition: good    Hospital Course: Shayy Vicente was admitted for [facility]      At discharge,     Consults: None    Objective:     BP (!) 148/72   Pulse 63   Temp 97.4 °F (36.3 °C) (Temporal)   Resp 15   Ht 5' 9\" (1.753 m)   Wt 187 lb (84.8 kg)   SpO2 90%   BMI 27.62 kg/m² Intake/Output Summary (Last 24 hours) at 11/14/2022 1134  Last data filed at 11/14/2022 0959  Gross per 24 hour   Intake 1000 ml   Output 5 ml   Net 995 ml       Physical Exam:  General:  Awake, alert, NAD  Skin:  Warm and dry  Neck:  JVD<8, no bruit  Chest:  Clear to auscultation, no wheezes/rhonchi/rales  Cardiovascular:  RRR S1S2  Abdomen:  Soft, nontender, +bowel sounds        Labs:   Lab Results   Component Value Date    CREATININE 2.1 (H) 11/14/2022    BUN 29 (H) 11/14/2022     11/14/2022    K 4.2 11/14/2022     11/14/2022    CO2 24 11/14/2022      Lab Results   Component Value Date    WBC 7.7 11/14/2022    HGB 11.3 (L) 11/14/2022    HCT 33.9 (L) 11/14/2022    MCV 92.8 11/14/2022     11/14/2022      Lab Results   Component Value Date    INR 1.04 08/17/2018    PROTIME 11.9 08/17/2018    No results found for: BNP      Disposition: home    Patient Instructions:       1. Overall the patient is stable from CV standpoint    The patient verbalizes understanding not to stop medications without discussing with us. Signed:   Reina Martinez MD, MPH    Medications on discharge    Patient Instructions:      Medication List        START taking these medications      aspirin EC 81 MG EC tablet  Take 1 tablet by mouth daily            CONTINUE taking these medications      amiodarone 200 MG tablet  Commonly known as: CORDARONE  TAKE ONE TABLET BY MOUTH DAILY     amLODIPine 2.5 MG tablet  Commonly known as: NORVASC  TAKE ONE TABLET BY MOUTH DAILY     apixaban 5 MG Tabs tablet  Commonly known as: ELIQUIS     ergocalciferol 1.25 MG (16403 UT) capsule  Commonly known as: ERGOCALCIFEROL     insulin glargine 100 UNIT/ML injection vial  Commonly known as: LANTUS     insulin lispro 100 UNIT/ML injection vial  Commonly known as: HUMALOG     lisinopril 5 MG tablet  Commonly known as: PRINIVIL;ZESTRIL  TAKE ONE TABLET BY MOUTH DAILY     loratadine 10 MG tablet  Commonly known as: CLARITIN     MYRBETRIQ PO triamcinolone 0.1 % cream  Commonly known as: KENALOG     Trulicity 1.5 DN/0.7BT SC injection  Generic drug: dulaglutide            STOP taking these medications      Hibiclens 4 % external liquid  Generic drug: chlorhexidine               Where to Get Your Medications        These medications were sent to [facility] [id number] - [address], [address]  [address] [place] [address] [id number]      Phone: [phone]   aspirin EC 81 MG EC tablet

## 2022-11-14 NOTE — PROGRESS NOTES
Discussed discharge instructions with pt and wife, questions answered, assisted pt up to BR to void. Was able to void and get dressed. R groin remains CD&I, pt w/o complaints. IV removed, pt discharged via wheelchair to car with instructions. Pt verbalized understanding of getting lab drawn on Friday and to restart elequis and start as ASA as prescribed.  Discharge complete

## 2022-11-14 NOTE — ANESTHESIA PRE PROCEDURE
[facility] Department of Anesthesiology  Pre-Anesthesia Evaluation/Consultation       Name:  Baljit Downing  : 1945  Age:  76 y.o.                                            MRN:  [id number]  Date: 2022           Surgeon: * No surgeons listed *    Procedure: * No procedures listed *     Allergies   Allergen Reactions    Doxycycline Monohydrate      Unsure of reaction    Bactrim [Sulfamethoxazole-Trimethoprim] Rash    Tetracyclines & Related Other (See Comments)     welts     Patient Active Problem List   Diagnosis    Acute transmural inferior wall MI ([address]    Essential hypertension    Hypokalemia    Type 2 diabetes mellitus with complication ([address]    SOB (shortness of breath)    Coronary artery disease involving native coronary artery of native heart without angina pectoris    History of ST elevation myocardial infarction (STEMI)    Pure hypercholesterolemia    Pure hypercholesterolemia    Prostate cancer ([address]    Retention, urine    S/P left heart catheterization by percutaneous approach    NSTEMI (non-ST elevated myocardial infarction) ([address]    TIA (transient ischemic attack)    FLOR (acute kidney injury) ([address]    Syncope    Chronic diastolic congestive heart failure (HCC)    PAF (paroxysmal atrial fibrillation) ([address]    [facility]peripheral [facility]) [facility]    Stage 3b chronic kidney disease ([address]    Stage 3a chronic kidney disease ([address]    Carotid stenosis, right     Past Medical History:   Diagnosis Date    Asthma     as a child-not current    CAD (coronary artery disease)     Carotid stenosis, right 10/11/2021    Chronic diastolic congestive heart failure ([address] 2021    CKD (chronic kidney disease)     CVA (cerebral vascular accident) ([address] 2020    Diabetes mellitus ([address]     History of MI (myocardial infarction)     [place] (hard of hearing) 10/2022    wears bilat hearing aids    HTN (hypertension)     Hyperlipidemia     Melanoma ([address]     back    PA[facility]ox[facility]) [facility]     Prostate CA ([facility]     Rectal bleed 10/2022    Syncope 01/15/2021     Past Surgical History:   Procedure Laterality Date    COLONOSCOPY      COLONOSCOPY N/A 10/19/2022    COLONOSCOPY POLYPECTOMY SNARE/COLD BIOPSY performed by Emily Bundy MD at [address]  10/19/2022    COLONOSCOPY ABLATION/apc to rectum to prevent bleeding performed by Emily Bundy MD at [address]  2016    DENA to mid circ    CYSTOSCOPY  2018    cysto, [place] T[facility]    HERNIA REPAIR Bilateral distant past    SHOULDER SURGERY Bilateral 10-12 years ago     Social History     Tobacco Use    Smoking status: Former     Types: Cigarettes     Quit date: 1986     Years since quittin.5    Smokeless tobacco: Never   Vaping Use    Vaping Use: Never used   Substance Use Topics    Alcohol use: No     Alcohol/week: 0.0 standard drinks    Drug use: No     Medications  Current Facility-Administered Medications on File Prior to Visit   Medication Dose Route Frequency Provider Last Rate Last Admin    0.9 % sodium chloride infusion   IntraVENous Continuous Srini Mooney MD        ceFAZolin (ANCEF) 2000 mg in dextrose 5 % 50 mL IVPB  2,000 mg IntraVENous On Call to [address] [place] [address] MD        diphenhydrAMINE (BENADRYL) injection 25 mg  25 mg IntraVENous Once Srini Mooney MD        methylPREDNISolone sodium (SOLU-MEDROL) injection 125 mg  125 mg IntraVENous Once Srini Mooney MD         Current Outpatient Medications on File Prior to Visit   Medication Sig Dispense Refill    chlorhexidine (HIBICLENS) 4 % external liquid Wash from neck down the night before or morning of the procedure 1 each 0    ergocalciferol (ERGOCALCIFEROL) 1.25 MG (36537 UT) capsule TAKE 1 CAPSULE BY MOUTH TWICE PER WEEK      Mirabegron (MYRBETRIQ PO) Take by mouth daily      apixaban (ELIQUIS) 5 MG TABS tablet Take by mouth 2 times daily      amiodarone (CORDARONE) 200 MG tablet TAKE ONE TABLET BY MOUTH DAILY 90 tablet 3    amLODIPine (NORVASC) 2.5 MG tablet TAKE ONE TABLET BY MOUTH DAILY 90 tablet 1    lisinopril (PRINIVIL;ZESTRIL) 5 MG tablet TAKE ONE TABLET BY MOUTH DAILY 30 tablet 5    TRULICITY 1.5 OK/3.4UH SOPN once a week On       triamcinolone (KENALOG) 0.1 % cream Apply topically 2 times daily      insulin lispro (HUMALOG) 100 UNIT/ML injection vial Inject into the skin 3 times daily (before meals) 12 units with breakfast  14 units with lunch  16 units with dinner      loratadine (CLARITIN) 10 MG tablet Take 10 mg by mouth daily [employer]      insulin glargine (LANTUS) 100 UNIT/ML injection vial Inject 48 Units into the skin daily       No current facility-administered medications for this visit. No current outpatient medications on file. Facility-Administered Medications Ordered in Other Visits   Medication Dose Route Frequency Provider Last Rate Last Admin    0.9 % sodium chloride infusion   IntraVENous Continuous Maddie Rivera MD        ceFAZolin (ANCEF) 2000 mg in dextrose 5 % 50 mL IVPB  2,000 mg IntraVENous On Call to [address] [place] [address], MD        diphenhydrAMINE (BENADRYL) injection 25 mg  25 mg IntraVENous Once Maddie Rivera MD        methylPREDNISolone sodium (SOLU-MEDROL) injection 125 mg  125 mg IntraVENous Once Maddie Rivera MD         Vital Signs (Current)   There were no vitals filed for this visit.                                          Vital Signs Statistics (for past 48 hrs)     Pulse  Av  Min: 71   Min taken time: 22  Max: 71   Max taken time: 22  Resp  Av  Min: 12   Min taken time: 22  Max: 12   Max taken time: 22  BP  Min: 164/89   Min taken time: 22  Max: 164/89   Max taken time: 22  SpO2  Av %  Min: 98 %   Min taken time: 22  Max: 98 %   Max taken time: 22  BP Readings from Last 3 Encounters:   22 (!) 164/89   10/20/22 134/70   10/19/22 (!) 179/89       BMI  There is no height or weight on file to calculate BMI. Estimated body mass index is 27.62 kg/m² as calculated from the following:    Height as of an earlier encounter on 11/14/22: 5' 9\" (1.753 m). Weight as of an earlier encounter on 11/14/22: 187 lb (84.8 kg). CBC   Lab Results   Component Value Date/Time    WBC 7.2 11/07/2022 07:50 AM    RBC 4.57 11/07/2022 07:50 AM    HGB 14.2 11/07/2022 07:50 AM    HCT 43.1 11/07/2022 07:50 AM    MCV 94.2 11/07/2022 07:50 AM    RDW 14.1 11/07/2022 07:50 AM     11/07/2022 07:50 AM     CMP    Lab Results   Component Value Date/Time     11/07/2022 07:50 AM    K 4.5 11/07/2022 07:50 AM    K 4.3 01/15/2021 03:41 AM     11/07/2022 07:50 AM    CO2 23 11/07/2022 07:50 AM    BUN 26 11/07/2022 07:50 AM    CREATININE 1.8 11/07/2022 07:50 AM    GFRAA 55 01/16/2021 04:36 AM    AGRATIO 1.3 01/16/2021 04:36 AM    LABGLOM 38 11/07/2022 07:50 AM    GLUCOSE 128 11/07/2022 07:50 AM    PROT 6.1 01/16/2021 04:36 AM    CALCIUM 9.9 11/07/2022 07:50 AM    BILITOT 0.4 01/16/2021 04:36 AM    ALKPHOS 85 01/16/2021 04:36 AM    AST 13 01/16/2021 04:36 AM    ALT 14 01/16/2021 04:36 AM     BMP    Lab Results   Component Value Date/Time     11/07/2022 07:50 AM    K 4.5 11/07/2022 07:50 AM    K 4.3 01/15/2021 03:41 AM     11/07/2022 07:50 AM    CO2 23 11/07/2022 07:50 AM    BUN 26 11/07/2022 07:50 AM    CREATININE 1.8 11/07/2022 07:50 AM    CALCIUM 9.9 11/07/2022 07:50 AM    GFRAA 55 01/16/2021 04:36 AM    LABGLOM 38 11/07/2022 07:50 AM    GLUCOSE 128 11/07/2022 07:50 AM     POCGlucose  No results for input(s): GLUCOSE in the last 72 hours.    [facility]    Lab Results   Component Value Date/Time    PROTIME 11.9 08/17/2018 11:52 AM    INR 1.04 08/17/2018 11:52 AM    APTT 29.1 08/17/2018 11:52 AM     HCG (If Applicable) No results found for: PREGTESTUR, PREGSERUM, HCG, HCGQUANT   ABGs No results found for: PHART, PO2ART, LMR3HNK, CRI0CRV, BEART, X3PRRWPT   Type & Screen (If Applicable)  No results found for: LABABO, LABRH                         BMI: Wt Readings from Last 3 Encounters:       NPO Status:  >8h                          Anesthesia Evaluation  Patient summary reviewed   history of anesthetic complications: PONV. Airway: Mallampati: II  TM distance: >3 FB   Neck ROM: full  Mouth opening: > = 3 FB   Dental:          Pulmonary: breath sounds clear to auscultation  (+) shortness of breath:  asthma:                            Cardiovascular:    (+) hypertension:, past MI: no interval change and > 6 months, CAD: obstructive, CABG/stent: no interval change, dysrhythmias: atrial fibrillation, CHF:,           Rate: normal                    Neuro/Psych:   (+) CVA:, TIA,             GI/Hepatic/Renal:   (+) bowel prep,      (-) GERD, liver disease and no renal disease       Endo/Other:    (+) DiabetesType II DM, , .    (-) hypothyroidism               Abdominal:             Vascular:   + PVD, aortic or cerebral, . Other Findings:             Anesthesia Plan      general     ASA 3     (EF 50-55%. )  Induction: intravenous. Anesthetic plan and risks discussed with patient. Plan discussed with CRNA. This pre-anesthesia assessment may be used as a history and physical.    DOS STAFF ADDENDUM:    Pt seen and examined, chart reviewed (including anesthesia, drug and allergy history). No interval changes to history and physical examination. Anesthetic plan, risks, benefits, alternatives, and personnel involved discussed with patient. Patient verbalized an understanding and agrees to proceed.       Lizbet Ramsay MD  November 14, 2022  8:19 AM

## 2022-11-14 NOTE — DISCHARGE INSTRUCTIONS
Watchman Discharge Instruction    Care of your puncture site:  Remove bandage 24 hours after the procedure. May shower in 24 hours but do not sit in a bathtub/pool of water for 5 days or until the wound is healed. Gently clean groin using soap and water. Dry thoroughly and apply a Band-Aid that covers the entire site. Use Band-Aid until skin heals over in about 3-5 days. Do not apply powder or lotion. Limit walking and stair climbing today. Normal Observations:  Soreness or tenderness which may last one week. Mild oozing from the incision site. Possible bruising that could last 2 weeks. Activity:  You may resume normal activity in 5 days or after the wound heals. Avoid lifting more than 10 pounds for 5 days or until the wound heals. Avoid strenuous exercise or activity for 1 week. You may return to work in 1 week  without restrictions       Call your doctor immediately if your condition worsens, for any other concerns, for a follow-up appointment or if you experience any of the following:  Increased swelling on the groin or leg. Unusual pain, numbness, or tingling of the groin or down the leg. Any signs of infection such as: redness, yellow drainage at the site, swelling or pain. IF GROIN STARTS BLEEDING SIGNIFICANTLY:   LAY FLAT, HOLD FIRM DIRECT PRESSURE, AND CALL 911    Antibiotic prophylaxis (amoxicillin 2 g once 60 minutes prior to procedure) for 6 months for:  a. Dental procedures including cleanings  b. Gastrointestinal procedures  c.     Genitourinary procedures  d. Respiratory procedures involving incision or biopsy  e. Procedures on infected skin or muscle. (This is only if you have one of these procedures within 6 months of having your Watchman procedure)      TO DO: FOLLOW UP AT [address] [place] [address],[address] RE-CHECKED Friday 11/18/22. START PLAVIX AND ASPIRIN      [facility] DISCHARGE INSTRUCTIONS    8/29/2022     Wear your seatbelt home.   You are under the influence of drugs. Do not drink alcohol, drive, operate machinery, or make any important decisions or sign any legal documents for 24 hours  A responsible adult needs to be with you for 24 hours. You may experience lightheadedness, dizziness, nausea, heightened emotions and/or sleepiness following surgery. Rest at home today- increase activity as tolerated. Progress slowly to a regular diet and drink plenty of fluids unless your physician has instructed you otherwise. If nausea becomes a problem, call your physician. Coughing, sore throat, and muscle aches are other side effects of anesthesia and should improve with time. Do not drive or operate machinery while taking narcotics.

## 2022-11-14 NOTE — PROGRESS NOTES
Pt from cath lab- admitted to PACU, oral airway removed upon arrival, O2 saturations stable on simple mask, R groin CD&I, pt awakening and following commands, NSR on tele, vss

## 2022-11-14 NOTE — TELEPHONE ENCOUNTER
11/14/2022 s/p SUCCESSFUL WATCHMAN LAAC PROCEDURE PER DR. Symone Reyna deployment of left atrial appendage occlusion device with no complication, WATCHMAN device used 27 mm size. --1-week f/u with Dinesh Whitney CNP on 11/21/2022 at 1:40 PM  --6-month f/u with Dinesh Whitney CNP on 05/8/2023 at 1:40 PM  --1-year unable to schedule d/t calendar doesn't go into 1/2023  All will print on discharge after visit summary. Brianna Galo procedure LAWRENCE to be completed 45-59 days post procedure (12/29/2022-1/12/2023) Order placed. Thanks.    Annetta Nj RN, BSN   Watchman Coordinator

## 2022-11-14 NOTE — PROCEDURES
[address]   Procedure Note      Procedure Performed by: Reian Martinez MD      Procedures performed:     Percutaneous transcatheter closure of the left atrial appendage with endocardial implant   Transeptal Puncture  LAWRENCE      Indication of procedure:  atrial fibrillation, CVA,  High Risk for bleeding  Bleeding Episodes    Patient has been seen by General cardiology and shared decision making has been made for pursuing MARLI closure. Patient here for MARLI closure with Watchman device. Details of procedure: The patient was brought to the electrophysiology laboratory in stable condition. The patient was in a fasting and non-sedated state. The risks, benefits and alternatives of the procedure were discussed with the patient. The risks including, but not limited to, the risks of vascular injury, bleeding, infection, device malfunction, lead dislodgement, radiation exposure, injury to cardiac and surrounding structures (including pneumothorax), stroke, myocardial infarction and death were discussed in detail. The patient opted to proceed with the device implantation. Written informed consent was signed and placed in the chart. Prophylactic antibiotic was given. The patient was prepped and draped in a sterile fashion. A timeout protocol was completed to identify the patient and the procedure being performed. Patient underwent general anesthesia by anesthesiology team.       Transesophageal echocardiography was performed and measurements of left atrial appendage, including ostium size and depth were obtained for selection of appropriate watchman device. Decision was made to use a size 27 Watchman device based on LAWRENCE measurement. Both groins were prepped in a sterile fashion. We gained access in both femoral veins. Right femoral access was obtained using modified using modified seldinger technique. Patient received a bolus of heparin prior transseptal puncture.  Transseptal punctures through intact septum were done using LAWRENCE guidance as well as pressure monitoring , and fluoroscopy in Irish and ESTRADA projections. We placed one SL-1 Sheaths inside the left atrium. A long wire was placed into the left superior pulmonary vein. Then the SL sheath was exchanged over the wire with double curve watchman access sheath. Then a pigtail catheter was inserted into the access sheath and was placed inside the left atrial appendage. Angiography of MARLI was performed. Pigtail catheter was removed. Then Watchman delivery sheath was inserted into the access sheath. Using fluoroscopy and live LAWRENCE the anterior lobe of the appendage was located and delivery sheath was advanced into this lobe. Then device was implanted into the appendage under fluoroscopy and live LAWRENCE imaging. It was noted that the device had a bit of shoulder in the inferior portion. ATug test was done multiple times to insure device stability   LAWRENCE guidance and 3D guidance revealed device to be well seated did not correct the position. After deployment a tug test was done and stability of device was checked. Position of device was checked. Measurement of device showed appropriate compression of the device. Using color Doppler, no leak around the was noted. PASS criteria for releasing of device were met and device was released. A figure of 8 was used to achieve hemostasis. Patient was extubated and transferred to the floor. No immediate complications noted. Assessment and Summary:    successful deployment of left atrial appendage occlusion device with no complication    WATCHMAN device used 27 mm size     Angiogram revealed good seal and no thrombus     LAWRENCE confirmed placement and seal    EBL Less than 30 mL  No complications        Plan:     The patient will be admitted and have usual post care  Start anticoagulation  Start ASA  Echocardiogram tomorrow   Patient is participating in the left atrial appendage occlusion/closure registry.  [facility] Registry is approved by the Centers for Medicare and Medicaid Services (CMS) to meet the registry requirements outlined in the national coverage decisions for Percutaneous Left Atrial Appendage Closure     Thank you for letting me participate in this patient's care and please do not hesitate to call me with any questions or concerns.      Nelda Hahn MD, MPH     [address] [place] [facility]  Ph: [phone]  Fax: [phone]

## 2022-11-15 LAB
EKG ATRIAL RATE: 72 BPM
EKG DIAGNOSIS: NORMAL
EKG Q-T INTERVAL: 434 MS
EKG QRS DURATION: 96 MS
EKG QTC CALCULATION (BAZETT): 471 MS
EKG R AXIS: -2 DEGREES
EKG T AXIS: 96 DEGREES
EKG VENTRICULAR RATE: 71 BPM

## 2022-11-15 PROCEDURE — 93010 ELECTROCARDIOGRAM REPORT: CPT | Performed by: INTERNAL MEDICINE

## 2022-11-15 NOTE — TELEPHONE ENCOUNTER
Spoke with the patient and his wife. Scheduled the LAWRENCE post watchman for Jan 2023. We went over all pre-procedure instructions. They both verbalized understanding and have # to call if any questions. PT aware the procedure may start little earlier than 10:30. [facility] AND SPINE [facility] AT [place]   The morning of your Procedure-LAWRENCE you will park in the hospital parking lot and report directly to the cath lab to check in. DATE: 1/11/2023 TIME:10:30 am, Arrive by 9:00 am        Pre-Procedure Instructions:  Do not eat or drink anything 8 hours before your procedure. Hold all diabetic medications the morning of the procedure including, Metfomin. If you take Lantus/Levemir only take ½ your normal dose the evening before. All other medications can be taken in the morning with sips of water. Do not hold anticoagulation therapy: warfarin, eliquis, xarelto therapy. Do not use any lotions, creams or perfume the morning of procedure. Please arrive 1 hour prior to procedure time. Please have a responsible adult to drive you home after procedure. It is recommended you do not drive for 24 hours after procedure. Cath lab will provide you with all post procedure instructions. If you have any questions regarding the procedure itself or medications please call [phone] and ask to speak with a nurse.       Meet updated & emailed cath lab

## 2022-11-15 NOTE — PATIENT INSTRUCTIONS
[facility]OP[facility] AND SPINE [facility] AT [place]   The morning of your Procedure-LAWRENCE you will park in the hospital parking lot and report directly to the cath lab to check in. DATE: 1/11/2023 TIME:10:30         Pre-Procedure Instructions:  Do not eat or drink anything 8 hours before your procedure. Hold all diabetic medications the morning of the procedure including, Metfomin. If you take Lantus/Levemir only take ½ your normal dose the evening before. All other medications can be taken in the morning with sips of water. Do not hold anticoagulation therapy: warfarin, eliquis, xarelto therapy. Do not use any lotions, creams or perfume the morning of procedure. Please arrive 1 hour prior to procedure time. Please have a responsible adult to drive you home after procedure. It is recommended you do not drive for 24 hours after procedure. Cath lab will provide you with all post procedure instructions. If you have any questions regarding the procedure itself or medications please call [phone] and ask to speak with a nurse.

## 2022-11-15 NOTE — TELEPHONE ENCOUNTER
Please schedule post Watchman LAWRENCE in the timeframe listed  Post procedure LAWRENCE to be completed 45-59 days post procedure (12/29/2022-1/12/2023)       [facility]OPED[facility] AND SPINE [facility] AT [place]   The morning of your Procedure-LAWRENCE you will park in the hospital parking lot and report directly to the cath lab to check in. DATE: ____________ TIME: _____________      Pre-Procedure Instructions:  Do not eat or drink anything 8 hours before your procedure. Hold all diabetic medications the morning of the procedure including, Metfomin. If you take Lantus/Levemir only take ½ your normal dose the evening before. All other medications can be taken in the morning with sips of water. Do not hold anticoagulation therapy: warfarin, eliquis, xarelto therapy. Do not use any lotions, creams or perfume the morning of procedure. Please arrive 1 hour prior to procedure time. Please have a responsible adult to drive you home after procedure. It is recommended you do not drive for 24 hours after procedure. Cath lab will provide you with all post procedure instructions. If you have any questions regarding the procedure itself or medications please call [phone] and ask to speak with a nurse.

## 2022-11-21 ENCOUNTER — OFFICE VISIT (OUTPATIENT)
Dept: CARDIOLOGY CLINIC | Age: 77
End: 2022-11-21
Payer: MEDICARE

## 2022-11-21 VITALS
DIASTOLIC BLOOD PRESSURE: 60 MMHG | HEART RATE: 77 BPM | HEIGHT: 69 IN | SYSTOLIC BLOOD PRESSURE: 136 MMHG | OXYGEN SATURATION: 96 % | WEIGHT: 188 LBS | BODY MASS INDEX: 27.85 KG/M2

## 2022-11-21 DIAGNOSIS — I48.0 PAROXYSMAL ATRIAL FIBRILLATION (HCC): ICD-10-CM

## 2022-11-21 DIAGNOSIS — I48.0 PAF (PAROXYSMAL ATRIAL FIBRILLATION) (HCC): Primary | ICD-10-CM

## 2022-11-21 PROCEDURE — 99214 OFFICE O/P EST MOD 30 MIN: CPT | Performed by: NURSE PRACTITIONER

## 2022-11-21 PROCEDURE — 1111F DSCHRG MED/CURRENT MED MERGE: CPT | Performed by: NURSE PRACTITIONER

## 2022-11-21 PROCEDURE — 1036F TOBACCO NON-USER: CPT | Performed by: NURSE PRACTITIONER

## 2022-11-21 PROCEDURE — G8427 DOCREV CUR MEDS BY ELIG CLIN: HCPCS | Performed by: NURSE PRACTITIONER

## 2022-11-21 PROCEDURE — 3074F SYST BP LT 130 MM HG: CPT | Performed by: NURSE PRACTITIONER

## 2022-11-21 PROCEDURE — 93000 ELECTROCARDIOGRAM COMPLETE: CPT | Performed by: NURSE PRACTITIONER

## 2022-11-21 PROCEDURE — 3078F DIAST BP <80 MM HG: CPT | Performed by: NURSE PRACTITIONER

## 2022-11-21 PROCEDURE — G8484 FLU IMMUNIZE NO ADMIN: HCPCS | Performed by: NURSE PRACTITIONER

## 2022-11-21 PROCEDURE — G8417 CALC BMI ABV UP PARAM F/U: HCPCS | Performed by: NURSE PRACTITIONER

## 2022-11-21 PROCEDURE — 1123F ACP DISCUSS/DSCN MKR DOCD: CPT | Performed by: NURSE PRACTITIONER

## 2022-11-21 NOTE — PROGRESS NOTES
The [address] [place] [address], [address] [place] [address] [address] [address]  [phone]    PrimaryCare Doctor:  Jewell Laurent MD  Primary Cardiologist: Dr. Suraj Angeles    Chief Complaint   Patient presents with    Follow-up     Paroxysmal atrial fibrillation. History of Present Illness:  Diana Miller is a 76 y.o. male with PMH CAD/ inferior MI s/p PCI/DENA to mid LCX, PAF (1/2021)HTN, Dm, CVA. Patient was admitted to [facility] for Watchman MARLI closure device implant for AF. Hx GIB/rectal bleed on eliquis. \"I over exerted myself Tuesday. \" One day after procedure. Went out to look at a house that was up for auction. Out of the house for several hours. Had extreme fatigue. Denies any CP, SOB, LH, dizziness, syncope. Energy level improved since them. He has been resting and watching [place]. R groin site CDI,no hematoma. Review of Systems:   General: Denies fever, chills  Skin: Denies skin changes, rash, itching, lesions.   HEENT: Denies headache, dizziness, vision changes, nosebleeds, sore throat, nasal drainage  RESP: Denies cough, sputum, dyspnea, wheeze, snoring  CARD: Denies palpitations,  murmur  GI:Denies nausea, vomiting, heartburn, loss of appetite, change in bowels  : Denies frequency, pain, incontinence, polyuria  VASC: Denies claudication, leg cramps, clots  MUSC/SKEL: Denies pain, stiffness, arthritis  PSYCH: Denies anxiety, depression, stress  NEURO: Denies numbness, tingling, weakness,change in mood or memory  HEME: Denies abn bruising, bleeding, anemia  ENDO: Denies intolerance to heat, cold, excessive thirst or hunger, hx thyroid disease    /60 (Site: Left Upper Arm, Position: Sitting)   Pulse 77   Ht 5' 9\" (1.753 m)   Wt 188 lb (85.3 kg)   SpO2 96%   BMI 27.76 kg/m²   Wt Readings from Last 3 Encounters:   11/21/22 188 lb (85.3 kg)   11/14/22 187 lb (84.8 kg)   10/20/22 187 lb (84.8 kg)       Physical Exam:  GEN: Appears frail, no acute distress  SKIN: Pink, warm, dry. Nails without clubbing. HEENT: PERRLA. Normocephalic, atraumatic. Neck supple. No adenopathy. LUNG: AP diameter normal. Clear bilateral. No wheeze, rales, or ronchi. Respiratory effort normal.  HEART: S1S2 A/R. No JVD. No carotid bruit. No murmur, rub or gallop. ABD: Soft, nontender. +BS X 4 quads. No hepatomegaly. EXT: Radial and pedal pulses 2+ and symmetric. Without varicosities. No edema. MUSCSKEL: Good ROM X4 extremities. No deformity. NEURO: A/O X3. Calm and cooperative. Past Medical History:   has a past medical history of Asthma, CAD (coronary artery disease), Carotid stenosis, right, Chronic diastolic congestive heart failure ([facility] CKD (chronic kidney disease), CVA (cerebral vascular accident) ([facility] Diabetes mellitus ([facility] History of MI (myocardial infarction), [place] (hard of hearing), HTN (hypertension), Hyperlipidemia, Melanoma ([facility] PAF (paroxysmal atrial fibrillation) ([facility] Prostate CA ([facility] Rectal bleed, and Syncope. Surgical History:   has a past surgical history that includes hernia repair (Bilateral, distant past); shoulder surgery (Bilateral, 10-12 years ago); Coronary angioplasty with stent (05/01/2016); Cystocopy (08/22/2018); Colonoscopy; Colonoscopy (N/A, 10/19/2022); and Colonoscopy (10/19/2022). Social History:   reports that he quit smoking about 36 years ago. His smoking use included cigarettes. He has never used smokeless tobacco. He reports that he does not drink alcohol and does not use drugs. Family History:   Family History   Problem Relation Age of Onset    No Known Problems Mother     No Known Problems Father        HomeMedications:  Prior to Admission medications    Medication Sig Start Date End Date Taking?  Authorizing Provider   aspirin EC 81 MG EC tablet Take 1 tablet by mouth daily 11/14/22  Yes Tayler Sevilla MD   ergocalciferol (ERGOCALCIFEROL) 1.25 MG (56857 UT) capsule TAKE 1 CAPSULE BY MOUTH TWICE PER WEEK 9/16/22  Yes Historical Provider, MD   Mirabegron (MYRBETRIQ PO) Take by mouth daily   Yes Historical Provider, MD   apixaban (ELIQUIS) 5 MG TABS tablet Take by mouth 2 times daily   Yes Historical Provider, MD   amiodarone (CORDARONE) 200 MG tablet TAKE ONE TABLET BY MOUTH DAILY 9/20/22  Yes Isra King MD   amLODIPine (NORVASC) 2.5 MG tablet TAKE ONE TABLET BY MOUTH DAILY 8/21/22  Yes Jose Alberto Ramachandran MD   lisinopril (PRINIVIL;ZESTRIL) 5 MG tablet TAKE ONE TABLET BY MOUTH DAILY 8/21/22  Yes Jose Alberto Ramachandran MD   TRULICITY 1.5 YK/0.8WC SOPN once a week On sunday 4/30/22  Yes Historical Provider, MD   triamcinolone (KENALOG) 0.1 % cream Apply topically 2 times daily 9/30/21  Yes Historical Provider, MD   insulin lispro (HUMALOG) 100 UNIT/ML injection vial Inject into the skin 3 times daily (before meals) 12 units with breakfast  14 units with lunch  16 units with dinner   Yes Historical Provider, MD   loratadine (CLARITIN) 10 MG tablet Take 10 mg by mouth daily kroger brand   Yes Historical Provider, MD   insulin glargine (LANTUS) 100 UNIT/ML injection vial Inject 48 Units into the skin daily   Yes Historical Provider, MD        Allergies:  Doxycycline monohydrate, Bactrim [sulfamethoxazole-trimethoprim], and Tetracyclines & related       LABS: Results reviewed with patient today.     CBC:   Lab Results   Component Value Date/Time    WBC 7.7 11/14/2022 09:52 AM    WBC 7.2 11/07/2022 07:50 AM    WBC 7.6 01/13/2022 10:59 AM    RBC 3.66 11/14/2022 09:52 AM    RBC 4.57 11/07/2022 07:50 AM    RBC 4.59 01/13/2022 10:59 AM    HGB 11.3 11/14/2022 09:52 AM    HGB 14.2 11/07/2022 07:50 AM    HGB 14.1 01/13/2022 10:59 AM    HCT 33.9 11/14/2022 09:52 AM    HCT 43.1 11/07/2022 07:50 AM    HCT 42.1 01/13/2022 10:59 AM    MCV 92.8 11/14/2022 09:52 AM    MCV 94.2 11/07/2022 07:50 AM    MCV 91.6 01/13/2022 10:59 AM    RDW 14.1 11/14/2022 09:52 AM    RDW 14.1 11/07/2022 07:50 AM    RDW 14.2 01/13/2022 10:59 AM     11/14/2022 09:52 AM     11/07/2022 07:50 AM     01/13/2022 10:59 AM     BMP:  Lab Results   Component Value Date/Time     11/14/2022 09:52 AM     11/07/2022 07:50 AM     01/16/2021 04:36 AM    K 4.2 11/14/2022 09:52 AM    K 4.5 11/07/2022 07:50 AM    K 4.1 01/16/2021 04:36 AM    K 4.3 01/15/2021 03:41 AM    K 4.3 07/29/2019 01:46 PM    K 4.3 08/23/2018 08:18 AM     11/14/2022 09:52 AM     11/07/2022 07:50 AM     01/16/2021 04:36 AM    CO2 24 11/14/2022 09:52 AM    CO2 23 11/07/2022 07:50 AM    CO2 23 01/16/2021 04:36 AM    BUN 29 11/14/2022 09:52 AM    BUN 26 11/07/2022 07:50 AM    BUN 28 01/16/2021 04:36 AM    CREATININE 2.1 11/14/2022 09:52 AM    CREATININE 1.8 11/07/2022 07:50 AM    CREATININE 1.5 01/16/2021 04:36 AM     BNP: No results found for: PROBNP    Parameters:   > 450 pg/mL under age 48  > 900 pg/mL ages 54-65  > 1800 pg/mL over age 76    Iron Studies:  No results found for: TIBC, FERRITIN  GLUCOSE: No results for input(s): GLUCOSE in the last 72 hours. LIVER PROFILE:   Lab Results   Component Value Date/Time    AST 13 01/16/2021 04:36 AM    ALT 14 01/16/2021 04:36 AM    LABALBU 3.5 01/16/2021 04:36 AM    BILITOT 0.4 01/16/2021 04:36 AM    ALKPHOS 85 01/16/2021 04:36 AM     PT/INR:   Lab Results   Component Value Date/Time    PROTIME 11.9 08/17/2018 11:52 AM    INR 1.04 08/17/2018 11:52 AM     Cardiac Enzymes:  Lab Results   Component Value Date/Time    TROPONINI <0.01 01/15/2021 01:50 PM     FASTING LIPID PANEL:  Lab Results   Component Value Date/Time    CHOL 104 01/15/2021 03:41 AM    HDL 44 01/15/2021 03:41 AM    LDLCALC 49 01/15/2021 03:41 AM    TRIG 54 01/15/2021 03:41 AM     TSH: No results found for: TSH    Cardiac Imaging: Reports interpreted by me and reviewed with patient today. EKG:    Cath:  [facility] 5/1/2016:  1. Left main trunk: It arises from the left sinus of Valsalva. It divides into left anterior descending artery and left circumflex artery.   The left main trunk is free of atherosclerosis. 2.  Left anterior descending artery: It is a moderate-sized artery and it descends into the intraventricular sulcus and wraps around the apex of the heart. The left main trunk is free of atherosclerosis. 3.  Left anterior descending artery: It is a moderate-sized artery. It descends into the intraventricular sulcus and wraps around the apex of the heart. The left anterior descending artery has about 50% stenosis in the mid to distal portion. There is also a 50% stenosis of the diagonal branch in the proximal segment. The lumen of the left anterior descending artery is free of any significant atherosclerosis. 4.  Left circumflex artery: It arises from the left main trunk. It is 100% occluded after a 1st obtuse marginal branch. The right coronary artery arises from right sinus on Valsalva. It is a dominant artery. It gives rise to a posterior descending and posterolateral branches. Right coronary artery has evidence of about 90% to 95% stenosis in the mid to distal portion of the PDA. There is also about 50% to 60% stenosis of the 2nd PDA in the midsegment, but it does not appear hemodynamically significant. 5.  Left ventriculogram reveals overall preserved left ventricular systolic function. Successful angioplasty followed by stent deployment in the mid circumflex artery using a drug-eluting stent, 100% stenosis reduced to 0% using 3.25 by 18 Alpine stent. Final diameter was 3.34 with 0% residual stenosis. SAMEER grade-3 flow was established. There appears to be a 40% to 50% stenosis of the distal circumflex branch beyond the area of the stenosis. There appears to be also a microembolization in a small distal circumflex branch.      Cardiac cath 12/26/19  Two-vessel stenting;  the mid RCA & the mid LAD   FFR of 0.75  LAD: 2.25 x 18 mm long jair DENA stent  Mid PDA: 2.25 x 12 mm long jair DENA stent  Lower diagonal branch was too small for stenting; will be treated medically     ECHO 1/15/2021  There is concentric left ventricular hypertrophy. Ejection fraction is visually estimated to be 02-00%  Grade I diastolic dysfunction with normal LV filling pressures. Normal right ventricular size and function. LAWRENCE 11/14/2022  Summary  There is a very small pericardial effusion at the start of the procedure. There is no evidence of thrombus within the left atrium or left atrial appendage. MARLI ostial measurements are as follows  0 degrees 1.69cm  48 degrees 1.81cm  90 degrees 1.79cm  135 degrees 1.76cm  137 degrees 2.07cm  138 degrees 1.87cm  A 27mm Watchman device is successfully deployed into the left atrial appendage. The device appears adequately compressed and  there is no evidence of leak by color flow. There is no change in the effusion upon completion of the procedure. Limited TTE DATE: 11/14/22 Post Watchman  Summary  Technically difficult examination due to visualization. Limited exam S/P Watchman procedure to evaluate for pericardial effusion. Normal left ventricular ejection fraction estimated at 55%. No significant pericardial effusion. Impression     1. Atrial fibrillation (non-valvular) s/p successful percutaneous left atrial appendage occlusion  CHADSVASC- 7  Limited ECHO without pericardial effusion. Recommendations     Tretament with antiplatalet and anti thrombotic therapy- aspirin, eliquis  LAWRENCE at 45 days - 1/11/2023  At 45 days, if WATCHMAN device is well seated with adequate seal and residual leak < 5 mm), then:   1) start dual anti platalet therapy for 4 months followed by single anti platalet therapy. 2) Anti thrombotic therapy can be discontinued. 3)amiodarone for rate/rhythm control    - antibiotic prophylaxis (amoxicillin 2 g once 60 minutes prior to procedure) for 6 months for:  a. Dental procedures including cleanings  b. Gastrointestinal procedures  c.     Genitourinary procedures  d.      Respiratory procedures involving incision or biopsy  e. Procedures on infected skin or muscle.            I appreciate the opportunity of cooperating in the care of this individual.    SURJIT Correa, APRN - CNP, CNP, 11/21/2022,2:16 PM

## 2023-01-11 ENCOUNTER — HOSPITAL ENCOUNTER (OUTPATIENT)
Dept: CARDIAC CATH/INVASIVE PROCEDURES | Age: 78
Discharge: HOME | End: 2023-01-11
Payer: MEDICARE

## 2023-01-11 VITALS
WEIGHT: 186 LBS | HEART RATE: 75 BPM | OXYGEN SATURATION: 99 % | DIASTOLIC BLOOD PRESSURE: 84 MMHG | TEMPERATURE: 97.2 F | RESPIRATION RATE: 16 BRPM | SYSTOLIC BLOOD PRESSURE: 166 MMHG | BODY MASS INDEX: 27.55 KG/M2 | HEIGHT: 69 IN

## 2023-01-11 DIAGNOSIS — Z95.818 PRESENCE OF WATCHMAN LEFT ATRIAL APPENDAGE CLOSURE DEVICE: ICD-10-CM

## 2023-01-11 DIAGNOSIS — I48.0 PAROXYSMAL A-FIB (HCC): ICD-10-CM

## 2023-01-11 PROCEDURE — 99152 MOD SED SAME PHYS/QHP 5/>YRS: CPT

## 2023-01-11 PROCEDURE — 2580000003 HC RX 258

## 2023-01-11 PROCEDURE — 93312 ECHO TRANSESOPHAGEAL: CPT

## 2023-01-11 PROCEDURE — 6360000002 HC RX W HCPCS

## 2023-01-11 RX ORDER — SODIUM CHLORIDE 9 MG/ML
INJECTION, SOLUTION INTRAVENOUS PRN
Status: DISCONTINUED | OUTPATIENT
Start: 2023-01-11 | End: 2023-01-12 | Stop reason: HOSPADM

## 2023-01-11 RX ORDER — CLOPIDOGREL BISULFATE 75 MG/1
75 TABLET ORAL DAILY
Qty: 30 TABLET | Refills: 3 | Status: SHIPPED | OUTPATIENT
Start: 2023-01-11

## 2023-01-11 RX ORDER — SODIUM CHLORIDE 0.9 % (FLUSH) 0.9 %
5-40 SYRINGE (ML) INJECTION PRN
Status: DISCONTINUED | OUTPATIENT
Start: 2023-01-11 | End: 2023-01-12 | Stop reason: HOSPADM

## 2023-01-11 RX ORDER — SODIUM CHLORIDE 0.9 % (FLUSH) 0.9 %
5-40 SYRINGE (ML) INJECTION EVERY 12 HOURS SCHEDULED
Status: DISCONTINUED | OUTPATIENT
Start: 2023-01-11 | End: 2023-01-12 | Stop reason: HOSPADM

## 2023-01-11 NOTE — H&P
Subjective:      History of Present Illness:    ASA 2  Mallampati 2     Baljit Downing is a 76 y.o. patient with a PMH significant for CAD s/p PCI, HTN, and DM who presents for management of CAD. He was hospitalized 5/1-5/3/2016 after presenting to [facility] with left arm pain and chest pressure and was found to have acute inferior wall MI.  cardiac cath which revealed a 100% mid Lcx stenosis that was successfully intervened with DENA placed. He has residual disease in his LAD, diagnoal & distal RCA. He was admitted 1/16/21 to [facility] with slurred speech, confusion, and syncope. His MRI showed nothing acute but multiple old infarcts. He was seen by Dr. Nicky Moses for elevated troponin which did not require any further inpatient cardiac work up. While on telemetry, a short run of Afib was noted and he was started on Amiodarone and Eliquis 5 mg bid. He reported the Eliquis was causing him to have bright red rectal bleeding. Holding his Eliquis for a couple days improved his rectal bleeding but he continued to experience rectal bleeding about once monthly. 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 He was admitted 9/15/22 with stroke like symptoms. His CT and MRI were negative for any acute findings but bilateral subdural hygromas were found. He reported taking his Eliquis only 5 times a week. Patient is being referred for Left Atrial Appendage Closure with WATCHMAN device for management of stroke risk resulting from non-valvular atrial fibrillation. Based on their past history, it has been determined that they are poor candidates for long-term oral-anticoagulation, however may be tolerant of short term treatment with warfarin as necessary.           Past Medical History:   has a past medical history of Asthma, CAD (coronary artery disease), Carotid stenosis, right, Chronic diastolic congestive heart failure ([address] CKD (chronic kidney disease), CVA (cerebral vascular accident) ([facility] [address] Diabetes mellitus ([facility] History of MI (myocardial infarction), [place] (hard of hearing), HTN (hypertension), Hyperlipidemia, Melanoma ([facility] PAF (paroxysmal atrial fibrillation) ([facility] Prostate CA ([facility] Rectal bleed, and Syncope. Surgical History:   has a past surgical history that includes hernia repair (Bilateral, distant past); shoulder surgery (Bilateral, 10-12 years ago); Coronary angioplasty with stent (05/01/2016); Cystocopy (08/22/2018); Colonoscopy; Colonoscopy (N/A, 10/19/2022); and Colonoscopy (10/19/2022). Social History:   reports that he quit smoking about 36 years ago. His smoking use included cigarettes. He has never used smokeless tobacco. He reports that he does not drink alcohol and does not use drugs. Family History:  family history includes No Known Problems in his father and mother. Home Medications:  Were reviewed and are listed in nursing record and/or below                Prior to Admission medications    Medication Sig Start Date End Date Taking?  Authorizing Provider   ergocalciferol (ERGOCALCIFEROL) 1.25 MG (00367 UT) capsule TAKE 1 CAPSULE BY MOUTH TWICE PER WEEK 9/16/22   Yes Historical Provider, MD   Mirabegron (MYRBETRIQ PO) Take by mouth daily     Yes Historical Provider, MD   apixaban (ELIQUIS) 5 MG TABS tablet Take by mouth 2 times daily     Yes Historical Provider, MD   amiodarone (CORDARONE) 200 MG tablet TAKE ONE TABLET BY MOUTH DAILY 9/20/22   Yes Hersey Barthel, MD   amLODIPine (NORVASC) 2.5 MG tablet TAKE ONE TABLET BY MOUTH DAILY 8/21/22   Yes Melina Hardin MD   lisinopril (PRINIVIL;ZESTRIL) 5 MG tablet TAKE ONE TABLET BY MOUTH DAILY 8/21/22   Yes Melina Hardin MD   TRULICITY 1.5 NQ/8.8EP SOPN once a week On sunday 4/30/22   Yes Historical Provider, MD   triamcinolone (KENALOG) 0.1 % cream Apply topically 2 times daily 9/30/21   Yes Historical Provider, MD   insulin lispro (HUMALOG) 100 UNIT/ML injection vial Inject into the skin 3 times daily (before meals) 12 units with breakfast  14 units with lunch  16 units with dinner     Yes Historical Provider, MD   loratadine (CLARITIN) 10 MG tablet Take 10 mg by mouth daily k[employer]ger brand     Yes Historical Provider, MD   insulin glargine (LANTUS) 100 UNIT/ML injection vial Inject 48 Units into the skin daily     Yes Historical Provider, MD         CURRENT Medications:  No current facility-administered medications for this visit. Allergies:  Doxycycline monohydrate, Bactrim [sulfamethoxazole-trimethoprim], and Tetracyclines & related               Review of Systems: All reviewed and refer to HPI  Constitutional: no unanticipated weight loss. There's been no change in energy level, sleep pattern, or activity level. No fevers, chills. Eyes: No visual changes or diplopia. No scleral icterus. ENT: No Headaches, hearing loss or vertigo. No mouth sores or sore throat. Cardiovascular: No Chest pain, tightness or discomfort. No Shortness of breath. No Dyspnea on exertion, Orthopnea, Paroxysmal nocturnal dyspnea or breathlessness at rest.  No Palpitations. No Syncope ('blackouts', 'faints', 'collapse') or dizziness. Respiratory: No cough or wheezing, no sputum production. No hematemesis. Gastrointestinal: No abdominal pain, appetite loss, blood in stools. No change in bowel or bladder habits. Genitourinary: No dysuria, trouble voiding, or hematuria. Musculoskeletal:  No gait disturbance, no joint complaints. Integumentary: No rash or pruritis. Neurological: No headache, diplopia, change in muscle strength, numbness or tingling. Psychiatric: No anxiety or depression. Endocrine: No temperature intolerance. No excessive thirst, fluid intake, or urination. No tremor. Hematologic/Lymphatic: No abnormal bruising or bleeding, blood clots or swollen lymph nodes. Allergic/Immunologic: No nasal congestion or hives.         Objective: all reveiwed       PHYSICAL EXAM: Vitals:     10/20/22 1230   BP: 134/70   Pulse: 80   SpO2: 98%    Weight: 187 lb (84.8 kg)       General Appearance:  Alert, cooperative, no distress, appears stated age. Head:  Normocephalic, without obvious abnormality, atraumatic. Eyes:  Pupils equal and round. No scleral icterus. Mouth: Moist mucosa, no pharyngeal erythema. Nose: Nares normal. No drainage or sinus tenderness. Neck: Supple, symmetrical, trachea midline. No adenopathy. No tenderness/mass/nodules. No carotid bruit or elevated JVD. Lungs:   Respiratory Effort: Normal   Auscultation: Clear to auscultation bilaterally, respirations unlabored. No wheeze, rales   Chest Wall:  No tenderness or deformity. Cardiovascular:     Pulses  Palpation: normal   Ascultation: Regular rate, S1/ S2 normal. No murmur, rub, or gallop. 2+ radial and pedal pulses, symmetric  Carotid  Femoral   Abdomen and Gastrointestinal:   Soft, non-tender, bowel sounds active. Liver and Spleen  Masses   Musculoskeletal: No muscle wasting  Back  Gait   Extremities: Extremities normal, atraumatic. No cyanosis or edema. No cyanosis clubbing         Skin: Inspection and palpation performed, no rashes or lesions. Pysch: Normal mood and affect.  Alert and oriented to time place person   Neurologic: Normal gross motor and sensory exam.       Labs: all labs have been reviewed                 Lab Results   Component Value Date/Time     WBC 7.6 01/13/2022 10:59 AM     RBC 4.59 01/13/2022 10:59 AM     HGB 14.1 01/13/2022 10:59 AM     HCT 42.1 01/13/2022 10:59 AM     MCV 91.6 01/13/2022 10:59 AM     RDW 14.2 01/13/2022 10:59 AM      01/13/2022 10:59 AM                Lab Results   Component Value Date/Time      01/16/2021 04:36 AM     K 4.1 01/16/2021 04:36 AM     K 4.3 01/15/2021 03:41 AM      01/16/2021 04:36 AM     CO2 23 01/16/2021 04:36 AM     BUN 28 01/16/2021 04:36 AM     CREATININE 1.5 01/16/2021 04:36 AM     GFRAA 55 01/16/2021 04:36 AM     AGRATIO 1.3 01/16/2021 04:36 AM     LABGLOM 45 01/16/2021 04:36 AM     GLUCOSE 184 01/16/2021 04:36 AM     PROT 6.1 01/16/2021 04:36 AM     CALCIUM 9.8 01/16/2021 04:36 AM     BILITOT 0.4 01/16/2021 04:36 AM     ALKPHOS 85 01/16/2021 04:36 AM     AST 13 01/16/2021 04:36 AM     ALT 14 01/16/2021 04:36 AM      No results found for: PTINR            Lab Results   Component Value Date     LABA1C 7.4 01/15/2021                Lab Results   Component Value Date     TROPONINI <0.01 01/15/2021         Cardiac, Vascular and Imaging Data: All Personally Reviewed in Detail by Myself       EKG: 10/20/2022 Sinus rhythm      Echocardiogram:   ECHO 1/15/2021  There is concentric left ventricular hypertrophy. Ejection fraction is visually estimated to be 05-60%  Grade I diastolic dysfunction with normal LV filling pressures. Normal right ventricular size and function. Stress Test:      Cath:  [facility] 5/1/2016:  1. Left main trunk: It arises from the left sinus of Valsalva. It divides into left anterior descending artery and left circumflex artery. The left main trunk is free of atherosclerosis. 2.  Left anterior descending artery: It is a moderate-sized artery and it descends into the intraventricular sulcus and wraps around the apex of the heart. The left main trunk is free of atherosclerosis. 3.  Left anterior descending artery: It is a moderate-sized artery. It descends into the intraventricular sulcus and wraps around the apex of the heart. The left anterior descending artery has about 50% stenosis in the mid to distal portion. There is also a 50% stenosis of the diagonal branch in the proximal segment. The lumen of the left anterior descending artery is free of any significant atherosclerosis. 4.  Left circumflex artery: It arises from the left main trunk. It is 100% occluded after a 1st obtuse marginal branch. The right coronary artery arises from right sinus on Valsalva. It is a dominant artery.   It gives rise to a posterior descending and posterolateral branches. Right coronary artery has evidence of about 90% to 95% stenosis in the mid to distal portion of the PDA. There is also about 50% to 60% stenosis of the 2nd PDA in the midsegment, but it does not appear hemodynamically significant. 5.  Left ventriculogram reveals overall preserved left ventricular systolic function. Successful angioplasty followed by stent deployment in the mid circumflex artery using a drug-eluting stent, 100% stenosis reduced to 0% using 3.25 by 18 Alpine stent. Final diameter was 3.34 with 0% residual stenosis. SAMEER grade-3 flow was established. There appears to be a 40% to 50% stenosis of the distal circumflex branch beyond the area of the stenosis. There appears to be also a microembolization in a small distal circumflex branch. Cardiac cath 12/26/19  Two-vessel stenting;  the mid RCA & the mid LAD   FFR of 0.75  LAD: 2.25 x 18 mm long ajir DENA stent  Mid PDA: 2.25 x 12 mm long jair DENA stent  Lower diagonal branch was too small for stenting; will be treated medically     Other imaging:      Assessment and Plan      Atrial Fibrillation,paroxsymal     Primary Cardiologist: Dr Lauren Saldaña  Referring Physician: Dr Lauren Saldaña  Referring Reason: GI bleed     CHADSvasc is at least 7 (Age,HTN,CVA,DM,CAD)  HASbled is at least 3      He is a poor candidate for chronic anticoagulation with his history of GI bleed. He would be an appropriate candidate for the watchman device. Patient is agreeable to proceed with the Watchman procedure and instructed Mary Ahumada, the coordinator will get in contact to facilitate scheduling. LAWRENCE today      Thank you for allowing us to participate in the care of [facility]. Please do not hesitate to contact me if you have any questions.      Tanika Adler MD, MPH

## 2023-05-08 ENCOUNTER — OFFICE VISIT (OUTPATIENT)
Dept: CARDIOLOGY CLINIC | Age: 78
End: 2023-05-08
Payer: MEDICARE

## 2023-05-08 VITALS
HEIGHT: 69 IN | BODY MASS INDEX: 27.85 KG/M2 | WEIGHT: 188 LBS | OXYGEN SATURATION: 96 % | SYSTOLIC BLOOD PRESSURE: 145 MMHG | DIASTOLIC BLOOD PRESSURE: 65 MMHG | HEART RATE: 71 BPM

## 2023-05-08 DIAGNOSIS — I10 ESSENTIAL HYPERTENSION: ICD-10-CM

## 2023-05-08 DIAGNOSIS — Z95.818 PRESENCE OF WATCHMAN LEFT ATRIAL APPENDAGE CLOSURE DEVICE: ICD-10-CM

## 2023-05-08 DIAGNOSIS — I73.9 PAD (PERIPHERAL ARTERY DISEASE) (HCC): ICD-10-CM

## 2023-05-08 DIAGNOSIS — I25.10 CORONARY ARTERY DISEASE INVOLVING NATIVE CORONARY ARTERY OF NATIVE HEART WITHOUT ANGINA PECTORIS: ICD-10-CM

## 2023-05-08 DIAGNOSIS — I48.0 PAF (PAROXYSMAL ATRIAL FIBRILLATION) (HCC): Primary | ICD-10-CM

## 2023-05-08 PROCEDURE — 3077F SYST BP >= 140 MM HG: CPT | Performed by: NURSE PRACTITIONER

## 2023-05-08 PROCEDURE — 1036F TOBACCO NON-USER: CPT | Performed by: NURSE PRACTITIONER

## 2023-05-08 PROCEDURE — 1123F ACP DISCUSS/DSCN MKR DOCD: CPT | Performed by: NURSE PRACTITIONER

## 2023-05-08 PROCEDURE — 3078F DIAST BP <80 MM HG: CPT | Performed by: NURSE PRACTITIONER

## 2023-05-08 PROCEDURE — G8417 CALC BMI ABV UP PARAM F/U: HCPCS | Performed by: NURSE PRACTITIONER

## 2023-05-08 PROCEDURE — 99214 OFFICE O/P EST MOD 30 MIN: CPT | Performed by: NURSE PRACTITIONER

## 2023-05-08 PROCEDURE — G8427 DOCREV CUR MEDS BY ELIG CLIN: HCPCS | Performed by: NURSE PRACTITIONER

## 2023-05-08 ASSESSMENT — ENCOUNTER SYMPTOMS
COUGH: 0
CONSTIPATION: 0
VOMITING: 0
NAUSEA: 0
BLOOD IN STOOL: 0
WHEEZING: 0
SINUS PRESSURE: 0
TROUBLE SWALLOWING: 0
SORE THROAT: 0
COLOR CHANGE: 0
SHORTNESS OF BREATH: 0
DIARRHEA: 0
ABDOMINAL PAIN: 0
BACK PAIN: 0

## 2023-05-08 NOTE — PROGRESS NOTES
[facility]       Date: 5/8/2023    Primary Cardiologist: Loren Arroyo MD  PCP: Ford Johnson MD     Chief Complaint:   Chief Complaint   Patient presents with    Follow-up     No cardiac issues      History of Present Illness:    I saw Ace Witt in the office for electrophysiology follow up today. He is a 76 y.o. male with a past medical history of CAD/ inferior MI s/p PCI/DENA to mid LCX, PAF (1/2021), HTN, DM and CVA. He underwent left atrial appendage closure with Watchman device on 11/14/2022. He had his follow-up [facility] on 1/7/2023 which showed a well-seated Watchman device with no evidence of mass or thrombus. He presents today for follow-up. He has been doing fairly well since his Watchman implant. He did report to have a fall last night and injured his right shoulder, he has it in a sling today but it has not been assessed. He has pain when he raises his arm. He was seen in the emergency room last week after having a near syncopal episode. He says his work-up showed that his blood pressure was low so he stopped taking both his lisinopril and amlodipine. He has upcoming follow-up with nephrology who does help manage his blood pressure. He denies any chest pain or dyspnea. No palpitations. No edema. Allergies: Allergies   Allergen Reactions    Doxycycline Monohydrate      Unsure of reaction    Bactrim [Sulfamethoxazole-Trimethoprim] Rash    Tetracyclines & Related Other (See Comments)     welts     Home Medications:  Prior to Visit Medications    Medication Sig Taking?  Authorizing Provider   aspirin EC 81 MG EC tablet Take 1 tablet by mouth daily Yes Chin Jade MD   Mirabegron (MYRBETRIQ PO) Take by mouth daily Yes Historical Provider, MD   amiodarone (CORDARONE) 200 MG tablet TAKE ONE TABLET BY MOUTH DAILY Yes Conchita Joseph MD   triamcinolone (KENALOG) 0.1 % cream Apply topically 2 times daily Yes Historical Provider, MD   insulin lispro (HUMALOG) 100 UNIT/ML

## 2023-09-19 RX ORDER — AMIODARONE HYDROCHLORIDE 200 MG/1
TABLET ORAL
Qty: 90 TABLET | Refills: 3 | Status: SHIPPED | OUTPATIENT
Start: 2023-09-19

## 2023-09-19 NOTE — TELEPHONE ENCOUNTER
Last OV: 5/8/23  Next OV: X due yearly  Last refill: 9/20/22  #90  3 R/F  Most recent Labs: 8/31/23 in Care Everywhere  Last EKG (if needed): 11/14/22

## 2023-12-14 RX ORDER — ROSUVASTATIN CALCIUM 20 MG/1
20 TABLET, COATED ORAL DAILY
Qty: 90 TABLET | Refills: 3 | Status: SHIPPED | OUTPATIENT
Start: 2023-12-14

## 2023-12-14 NOTE — TELEPHONE ENCOUNTER
Last OV: 5/8/23  Next OV: X due yearly  Last refill:  9/22/22 #90 5 R/F  Most recent Labs: 8/31/23 in Care Everywhere  Last EKG (if needed): 11/14/22

## 2024-02-19 ENCOUNTER — TELEPHONE (OUTPATIENT)
Dept: CARDIOLOGY CLINIC | Age: 79
End: 2024-02-19

## 2024-02-19 NOTE — TELEPHONE ENCOUNTER
Wife called the office to schedule a f/u appointment with River. Last OV was 10/2022. Has since seen ENEDINA Leyva and Elizabeth. Was advised to f/u with Guadalupe yearly (May). Does Jon need an appointment with River as well?     Please advise.    Emily's callback: [phone]

## 2024-06-28 NOTE — PLAN OF CARE
Number Of Freeze-Thaw Cycles: 1 freeze-thaw cycle
Problem: Falls - Risk of:  Goal: Will remain free from falls  Description: Will remain free from falls  Outcome: Ongoing  Goal: Absence of physical injury  Description: Absence of physical injury  Outcome: Ongoing     Problem: Skin Integrity:  Goal: Will show no infection signs and symptoms  Description: Will show no infection signs and symptoms  Outcome: Ongoing  Goal: Absence of new skin breakdown  Description: Absence of new skin breakdown  Outcome: Ongoing     Problem: HEMODYNAMIC STATUS  Goal: Patient has stable vital signs and fluid balance  Outcome: Ongoing     Problem: ACTIVITY INTOLERANCE/IMPAIRED MOBILITY  Goal: Mobility/activity is maintained at optimum level for patient  Outcome: Ongoing     Problem: COMMUNICATION IMPAIRMENT  Goal: Ability to express needs and understand communication  Outcome: Ongoing
Post-Care Instructions: I reviewed with the patient in detail post-care instructions. Patient is to wear sunprotection, and avoid picking at any of the treated lesions. Pt may apply Vaseline to crusted or scabbing areas.
Show Aperture Variable?: Yes
Detail Level: Detailed
Aperture Size (Optional): C
Application Tool (Optional): Liquid Nitrogen Sprayer
Render Note In Bullet Format When Appropriate: No
Consent: The patient's consent was obtained including but not limited to risks of crusting, scabbing, blistering, scarring, darker or lighter pigmentary change, recurrence, incomplete removal and infection.
Duration Of Freeze Thaw-Cycle (Seconds): 0

## 2024-11-08 RX ORDER — AMIODARONE HYDROCHLORIDE 200 MG/1
TABLET ORAL
Qty: 30 TABLET | Refills: 0 | Status: SHIPPED | OUTPATIENT
Start: 2024-11-08

## 2024-11-08 NOTE — TELEPHONE ENCOUNTER
Medication provider checked, last note checked for changes, recent labs checked, last visit or next visit within range, medication approved for refill for 1 month    Please schedule patient to be seen by Dr. Butcher for Afib

## 2024-11-20 NOTE — PROGRESS NOTES
[facility]      Cardiology Consult    Jon Medrano  1945 November 20, 2024    Reason for Visit: CAD, PAF    CC: \"I'm doing okay.\"     HPI:  The patient is 79 y.o. male with a past medical history significant for CAD s/p PCI, HTN, and DM who presents for management of CAD. He was hospitalized 5/1-5/3/2016 after presenting to [facility] with left arm pain and chest pressure and was found to have acute inferior wall MI. He was transferred to [facility] for urgent cardiac cath which revealed a 100% mid Lcx stenosis that was successfully intervened with DENA placed. He has residual disease in his LAD, diagnoal & distal RCA. He was admitted to [facility] 2/2017 with atypical chest pains. He ruled out for an MI and underwent stress testing which did not show any reversible defects. He was seen by Dr. Singh in outpatient follow up and underwent PCI of the first PDA, diagonal branch and FFR of the lesions in the left circumflex artery and LAD. He was admitted 1/16/21 to [facility] from [facility] with slurred speech, confusion, and syncope. His MRI showed nothing acute but multiple old infarcts. He was seen by Dr. Nicolas for elevated troponin which did not require any further inpatient cardiac work up. While on telemetry, a short run of Afib was noted and he was started on Amiodarone and Eliquis 5 mg bid. He reported the Eliquis was causing him to have bright red rectal bleeding. Holding his Eliquis for a couple days improved his rectal bleeding but he continued to experience rectal bleeding about once monthly. 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 He was admitted 9/15/22 with stroke like symptoms. His CT and MRI were negative for any acute findings but bilateral subdural hygromas were found. He reported taking his Eliquis only 5 times a week.    He ultimately underwent implantation of Watchman with Dr. Holman on 11/14/22.       He was last seen in office 9/22/22. -

## 2024-11-21 ENCOUNTER — OFFICE VISIT (OUTPATIENT)
Dept: CARDIOLOGY CLINIC | Age: 79
End: 2024-11-21
Payer: COMMERCIAL

## 2024-11-21 VITALS
HEART RATE: 80 BPM | DIASTOLIC BLOOD PRESSURE: 62 MMHG | OXYGEN SATURATION: 97 % | BODY MASS INDEX: 27.99 KG/M2 | WEIGHT: 189 LBS | SYSTOLIC BLOOD PRESSURE: 134 MMHG | HEIGHT: 69 IN

## 2024-11-21 DIAGNOSIS — I48.0 PAF (PAROXYSMAL ATRIAL FIBRILLATION) (HCC): Primary | ICD-10-CM

## 2024-11-21 DIAGNOSIS — I10 ESSENTIAL HYPERTENSION: ICD-10-CM

## 2024-11-21 DIAGNOSIS — I25.10 CORONARY ARTERY DISEASE INVOLVING NATIVE CORONARY ARTERY OF NATIVE HEART WITHOUT ANGINA PECTORIS: Chronic | ICD-10-CM

## 2024-11-21 DIAGNOSIS — I73.9 PAD (PERIPHERAL ARTERY DISEASE) (HCC): ICD-10-CM

## 2024-11-21 DIAGNOSIS — Z95.818 PRESENCE OF WATCHMAN LEFT ATRIAL APPENDAGE CLOSURE DEVICE: ICD-10-CM

## 2024-11-21 DIAGNOSIS — R01.1 HEART MURMUR: ICD-10-CM

## 2024-11-21 PROCEDURE — 3075F SYST BP GE 130 - 139MM HG: CPT | Performed by: INTERNAL MEDICINE

## 2024-11-21 PROCEDURE — 99214 OFFICE O/P EST MOD 30 MIN: CPT | Performed by: INTERNAL MEDICINE

## 2024-11-21 PROCEDURE — 1123F ACP DISCUSS/DSCN MKR DOCD: CPT | Performed by: INTERNAL MEDICINE

## 2024-11-21 PROCEDURE — 93000 ELECTROCARDIOGRAM COMPLETE: CPT | Performed by: INTERNAL MEDICINE

## 2024-11-21 PROCEDURE — 3078F DIAST BP <80 MM HG: CPT | Performed by: INTERNAL MEDICINE

## 2024-11-21 NOTE — PROGRESS NOTES
[facility]      Cardiology Consult    Jon Medrano  1945 November 21, 2024    Reason for Visit: CAD, PAF    CC: \"No issues or questions\"     HPI:  The patient is 79 y.o. male with a past medical history significant for CAD s/p PCI, HTN, and DM who presents for management of CAD. He was hospitalized 5/1-5/3/2016 after presenting to [facility] with left arm pain and chest pressure and was found to have acute inferior wall MI. He was transferred to [facility] for urgent cardiac cath which revealed a 100% mid Lcx stenosis that was successfully intervened with DENA placed. He has residual disease in his LAD, diagnoal & distal RCA. He was admitted to [facility] 2/2017 with atypical chest pains. He ruled out for an MI and underwent stress testing which did not show any reversible defects. He was seen by Dr. Singh in outpatient follow up and underwent PCI of the first PDA, diagonal branch and FFR of the lesions in the left circumflex artery and LAD. He was admitted 1/16/21 to [facility] from [facility] with slurred speech, confusion, and syncope. His MRI showed nothing acute but multiple old infarcts. He was seen by Dr. Nicolas for elevated troponin which did not require any further inpatient cardiac work up. While on telemetry, a short run of Afib was noted and he was started on Amiodarone and Eliquis 5 mg bid. He reported the Eliquis was causing him to have bright red rectal bleeding. Holding his Eliquis for a couple days improved his rectal bleeding but he continued to experience rectal bleeding about once monthly. 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 He was admitted 9/15/22 with stroke like symptoms. His CT and MRI were negative for any acute findings but bilateral subdural hygromas were found. He reported taking his Eliquis only 5 times a week. He ultimately underwent implantation of Watchman with Dr. Holman on 11/14/22. He was last seen in office 9/22/22.

## 2024-12-05 RX ORDER — ROSUVASTATIN CALCIUM 20 MG/1
20 TABLET, COATED ORAL DAILY
Qty: 90 TABLET | Refills: 3 | Status: SHIPPED | OUTPATIENT
Start: 2024-12-05

## 2024-12-05 NOTE — TELEPHONE ENCOUNTER
Last OV: 11/21/24  Next OV: 12/18/25  Last refill: 12/14/23 #90 3 R/F  Most recent Labs: 11/5/24  Last EKG (if needed): 11/21/24

## 2025-02-12 RX ORDER — AMIODARONE HYDROCHLORIDE 200 MG/1
TABLET ORAL
Qty: 30 TABLET | Refills: 3 | Status: SHIPPED | OUTPATIENT
Start: 2025-02-12

## 2025-02-12 NOTE — TELEPHONE ENCOUNTER
Last OV: 11/21/24  Next OV: 12/18/25  Last refill: 11/8/24 #30  Most recent Labs: 11/5/24  Last EKG (if needed):11/21/24

## 2025-05-25 NOTE — PROGRESS NOTES
Patient admitted to PACU # 7 from OR at [phone] post Colonoscopy polypectomy snare/cold biopsy per MD Freire. Attached to PACU monitoring system and report received from anesthesia provider. Patient was reported to be hemodynamically stable during procedure. Patient drowsy on admission and denied pain. 25-May-2025 20:19

## 2025-06-23 RX ORDER — AMIODARONE HYDROCHLORIDE 200 MG/1
200 TABLET ORAL DAILY
Qty: 30 TABLET | Refills: 3 | Status: SHIPPED | OUTPATIENT
Start: 2025-06-23

## 2025-06-23 NOTE — TELEPHONE ENCOUNTER
Last OV: 11/21/24  Next OV: 12/18/24  Last refill: 2/12/25 #30 3 R/F  Most recent Labs: 1/24/25 care everywhere  Last EKG (if needed): 11/21/24

## (undated) DEVICE — FIAPC® PROBE W/ FILTER 2200 A OD 2.3MM/6.9FR; L 2.2M/7.2FT: Brand: ERBE

## (undated) DEVICE — ENDOSCOPY KIT: Brand: MEDLINE INDUSTRIES, INC.

## (undated) DEVICE — FORCEPS BX 240CM 2.4MM L NDL RAD JAW 4 M00513334

## (undated) DEVICE — ELECTRODE PT RET AD L9FT HI MOIST COND ADH HYDRGEL CORDED